# Patient Record
Sex: FEMALE | Race: WHITE | NOT HISPANIC OR LATINO | Employment: UNEMPLOYED | URBAN - METROPOLITAN AREA
[De-identification: names, ages, dates, MRNs, and addresses within clinical notes are randomized per-mention and may not be internally consistent; named-entity substitution may affect disease eponyms.]

---

## 2019-10-28 PROBLEM — N80.129 ENDOMETRIOMA OF OVARY: Status: ACTIVE | Noted: 2019-10-28

## 2020-07-29 DIAGNOSIS — Z36.9 ENCOUNTER FOR FETAL ULTRASOUND: Primary | ICD-10-CM

## 2020-08-04 ENCOUNTER — OFFICE VISIT (OUTPATIENT)
Dept: MATERNAL FETAL MEDICINE | Facility: CLINIC | Age: 35
End: 2020-08-04
Payer: OTHER GOVERNMENT

## 2020-08-04 ENCOUNTER — PROCEDURE VISIT (OUTPATIENT)
Dept: MATERNAL FETAL MEDICINE | Facility: CLINIC | Age: 35
End: 2020-08-04
Payer: OTHER GOVERNMENT

## 2020-08-04 VITALS
DIASTOLIC BLOOD PRESSURE: 74 MMHG | SYSTOLIC BLOOD PRESSURE: 122 MMHG | BODY MASS INDEX: 36.17 KG/M2 | WEIGHT: 191.56 LBS | HEIGHT: 61 IN

## 2020-08-04 DIAGNOSIS — O26.642 CHOLESTASIS DURING PREGNANCY IN SECOND TRIMESTER: Primary | ICD-10-CM

## 2020-08-04 DIAGNOSIS — O28.0 ABNORMAL MSAFP (MATERNAL SERUM ALPHA-FETOPROTEIN), ELEVATED: ICD-10-CM

## 2020-08-04 DIAGNOSIS — R79.89 HIGH SERUM BILE ACID: ICD-10-CM

## 2020-08-04 DIAGNOSIS — O28.5 MATERNAL SERUM SCREEN POSITIVE FOR TRISOMY 21: ICD-10-CM

## 2020-08-04 DIAGNOSIS — Z36.9 ENCOUNTER FOR FETAL ULTRASOUND: ICD-10-CM

## 2020-08-04 PROCEDURE — 76811 PR US, OB FETAL EVAL & EXAM, TRANSABDOM,FIRST GESTATION: ICD-10-PCS | Mod: 26,S$PBB,, | Performed by: OBSTETRICS & GYNECOLOGY

## 2020-08-04 PROCEDURE — 99213 OFFICE O/P EST LOW 20 MIN: CPT | Mod: PBBFAC | Performed by: OBSTETRICS & GYNECOLOGY

## 2020-08-04 PROCEDURE — 99243 PR OFFICE CONSULTATION,LEVEL III: ICD-10-PCS | Mod: 25,S$PBB,, | Performed by: OBSTETRICS & GYNECOLOGY

## 2020-08-04 PROCEDURE — 99243 OFF/OP CNSLTJ NEW/EST LOW 30: CPT | Mod: 25,S$PBB,, | Performed by: OBSTETRICS & GYNECOLOGY

## 2020-08-04 PROCEDURE — 76811 OB US DETAILED SNGL FETUS: CPT | Mod: PBBFAC | Performed by: OBSTETRICS & GYNECOLOGY

## 2020-08-04 PROCEDURE — 99999 PR PBB SHADOW E&M-EST. PATIENT-LVL III: ICD-10-PCS | Mod: PBBFAC,,, | Performed by: OBSTETRICS & GYNECOLOGY

## 2020-08-04 PROCEDURE — 99999 PR PBB SHADOW E&M-EST. PATIENT-LVL III: CPT | Mod: PBBFAC,,, | Performed by: OBSTETRICS & GYNECOLOGY

## 2020-08-04 PROCEDURE — 76811 OB US DETAILED SNGL FETUS: CPT | Mod: 26,S$PBB,, | Performed by: OBSTETRICS & GYNECOLOGY

## 2020-08-04 RX ORDER — LEVOTHYROXINE SODIUM 50 UG/1
TABLET ORAL
Status: ON HOLD | COMMUNITY
End: 2020-09-21

## 2020-08-04 RX ORDER — ASCORBIC ACID, CHOLECALCIFEROL, DL-.ALPHA.-TOCOPHEROL ACETATE, THIAMINE HYDROCHLORIDE, RIBOFLAVIN, NIACINAMIDE, PYRIDOXINE HYDROCHLORIDE, FOLIC ACID, CYANOCOBALAMIN, CALCIUM CARBONATE, FERROUS FUMARATE, ZINC OXIDE, CUPRIC SULFATE 100; 400; 30; 1.6; 1.6; 20; 3.1; 1; 12; 200; 27; 10; 2 MG/1; [IU]/1; [IU]/1; MG/1; MG/1; MG/1; MG/1; MG/1; UG/1; MG/1; MG/1; MG/1; MG/1
TABLET, COATED ORAL
Status: ON HOLD | COMMUNITY
Start: 2020-05-26 | End: 2020-09-21

## 2020-08-04 NOTE — PROGRESS NOTES
Maternal Fetal Medicine consult    SUBJECTIVE:     Shanae Vo is a 34 y.o.  female with IUP at 21w4d who is seen in consultation by MFM for evaluation of:  Elevated bile acids    Patient also incidentally noted to have an abnormal sequential screen    OB HX:  G1 current    The patient was referred from Dr Vega for evaluation of elevated bile acids. The patient reports she started having severe itching of her palms and soles about 1 month ago. It is worse at night time. It now also involves her entire body. She also had elevated LFTs. She also had an abnormal sequential screen with an elevated HCG and AFP level.  She denies a history of liver disease, hepatitis, RUQ pain. She denies IVDU or alcohol intake. She has never had a blood transfusion.  She reports the bile acids were not checked while fasting. She indicated she is only using topical cream for itching and did not have persistent itching during our consult. She denies having any rash. She uses free and clear detergents.    Past Medical History:   Diagnosis Date    Finger infection     right finger- patient will go to urgent care and take care of it    Thyroid disease     hypothyroid     Past Surgical History:   Procedure Laterality Date    DIAGNOSTIC LAPAROSCOPY N/A 10/28/2019    Procedure: LAPAROSCOPY, DIAGNOSTIC;  Surgeon: Luis Armando Vega MD;  Location: [facility] OR;  Service: OB/GYN;  Laterality: N/A;    LAPAROSCOPIC SURGICAL REMOVAL OF CYST OF OVARY Left 10/28/2019    Procedure: EXCISION, CYST, OVARY, LAPAROSCOPIC, LEFT;  Surgeon: Luis Armando Vega MD;  Location: [facility] OR;  Service: OB/GYN;  Laterality: Left;    WISDOM TOOTH EXTRACTION       Family history: negative for birth defects, recurrent miscarriages, chromosomal abnormalities.   Social History     Tobacco Use    Smoking status: Never Smoker   Substance Use Topics    Alcohol use: Yes     Comment: social    Drug use: Never     Review of patient's allergies indicates:    Allergen Reactions    Shrimp Nausea And Vomiting     Medications:  PNV  Levothyroixine  Patient stopped vaginal progesterone after first trimester.    Aneuploidy screening:   NIPT low risk  AFP (3.83 MoM)/HCG (5.93 MoM) elevated on sequential screen-increased risk of Trisomy 21 and ONTD    Records reviewed    Care team members:  Gary - Primary OB     OBJECTIVE:     Blood Pressure: 122/74  Weight: 86kg    Physical Exam:  No RUQ pain on palpation  No rash on hands or feet    Ultrasound performed. See viewpoint for full ultrasound report.    Significant labs/imaging:  Bile acids 20.9  AST 38 (WNL)  ALT 42 (ULN 32)  TSH 1st T 3.7  Normal direct and total bilirubin  RPR NR  HepBS ag negative  plt 420    ASSESSMENT/PLAN:     34 y.o.  female with IUP at 21w4d     1. Itching and increased bile acids:We discussed causes of the lab abnormalities noted above. The patient has clinical symptoms of intrahepatic cholestasis of pregnancy with severe itching, elevated BAs and LFTs, however need to rule out maternal liver disease given these findings and an elevated AFP on sequential screen. Patient is also very early in pregnancy for a diagnosis of cholestasis.Recommend primary OB evaluate fasting bile acids, CMP, hepatitis panel, TSH, preE labs including assessment of urine protein, coagulation panel. TSH should be kept in the range recommended by ACOG (last maternal TSH was elevated for pregnancy).  Recommend primary OB arrange for the patient to have a RUQ/abdominal ultrasound ideally with doppler to assess her liver.  Please send results of the above evaluation to [facility] will place referral for patient to see hepatology. Patient would like to be seen within the [facility] system. As patient has , she may need referral from her PCP. We have also messaged hepatology directly as we desire at timely referral. Primary ob to ensure patient has scheduled an appointment.  Will see patient back in 4 weeks for growth  scan/follow up anatomic survey. If maternal liver disease has been ruled out, will treat patient as though she has ICP. We discussed the pregnancy implications of ICP including fetal distress and stillbirth, which is difficult to predict with antepartum surveillance. Will re-address this issue with the patient if it seems this is the most likely diagnosis. It is too early at this time to initiate  fetal surveillance. I also briefly discussed the utility of ursodiol and we agreed to revisit this at her next visit. This can be revisited sooner if patient has hepatology visit in near future. We are happy to see the patient for an earlier appointment but have tentatively scheduled this pending her hepatology evaluation and the studies above. Recommend primary ob see patient weekly in the interim and follow LFTs weekly if they remain elevated. Please contact M if any concerns prior to this.  Patient advised to avoid hepatotoxic agents.  Patient is aware that she can take Benadryl but has not tried this.  Patient aware that if cholestasis is diagnosed pregnancy planning will be altered.  She has an appointment with her primary ob Friday and was advised to be fasting for that visit.  Patient had one increased bp at 20 weeks but none since or prior. She had a mild headache today but did not take any medications. She denies any other concerns. Advised to contact her OB if no resolution of headache and primary ob should check labs earlier if any concerns.. BP is normal today. Precautions given. Also recommend baseline preeclampsia labs as above with primary ob. Primary ob should monitor patient closely for any signs/symptoms of preeclampsia.      2. Screening: Fetal ultrasound is incomplete but structures visualized today appear normal.  We discussed the possible causes of an elevated maternal serum AFP including open neural tube defects and abdominal wall defects. We discussed other potential etiologies including  but not limited to vaginal  bleeding, placental etiologies, multiple gestation, dating errors, demise, kidney and bowel issues, and other genetic disorders. In very elevated unexplained MSAFP  levels, maternal tumors must be considered. We also discussed the increased risk for pregnancy complications in patients with unexplained, elevated maternal serum AFP. Women with an elevated maternal serum AFP and a normal ultrasound have an increased risk for pre-term labor, decreased birth weight, intrauterine growth retardation, and stillbirth and adverse pregnancy outcomes.The patient did also have bleeding earlier in pregnancy which can contribute to AFP. We discussed the limitations of ultrasound in prenatal diagnosis of these anomalies. The patient was offered amniocentesis and declined.Patient's screen was also positive for Trisomy 21 with low risk NIPT. We discussed the residual risk of a chromosomal abnormality with low risk NIPT and the limitations of ultrasound in evaluating for Trisomy 21. Patient declines amniocentesis.  Primary ob should offer daily baby asa 81mg po daily given increased AFP and hcg on screen.  Patient will need serial growth scans as pregnancy progresses given abnormal screen with multiple abnormal analytes. Timing will be dependent on clinical course and follow up. Recommend primary ob verify correct KRISTINA used on serum screen.    3. Primary ob managing thyroid disease. Please keep TSH in recommended range for pregnancy.    4. Please see separate ultrasound report.    Time spent in consultation today: 45 minutes, >50% of which was face-to-face time with the patient.    Ami Chou MD  Maternal Fetal Medicine fellow  PGY-6

## 2020-08-04 NOTE — LETTER
August 5, 2020      Luis Armando Vega MD  [address]  [address]  [place] [address]           [facility] 4th Fl  [address]  [facility] [address]  Phone: [phone]          Patient: Shanae Vo   MR Number: [id number]   YOB: 1985   Date of Visit: 8/4/2020       Dear Dr. Luis Armando Vega:    Thank you for referring Shanae Vo to me for evaluation. Attached you will find relevant portions of my assessment and plan of care.    If you have questions, please do not hesitate to call me. I look forward to following Shanae Vo along with you.    Sincerely,    Laura Lloyd MD    Enclosure  CC:  No Recipients    If you would like to receive this communication electronically, please contact [email or web address] or [phone] to request more information on [employer] Link access.    For providers and/or their staff who would like to refer a patient to [facility], please contact us through our one-stop-shop provider referral line, [facility] , at [phone].    If you feel you have received this communication in error or would no longer like to receive these types of communications, please e-mail [email or web address]

## 2020-08-05 ENCOUNTER — TELEPHONE (OUTPATIENT)
Dept: TRANSPLANT | Facility: CLINIC | Age: 35
End: 2020-08-05

## 2020-08-05 NOTE — TELEPHONE ENCOUNTER
----- Message from Nissa Garcia sent at 8/5/2020  1:21 PM CDT -----    Returned call to pt and her  about her insur. She said that with the insur they have it allows her to chose her own providers with out a ref.  .  ----- Message -----  From: Jeannie Amezcua  Sent: 8/5/2020  12:57 PM CDT  To: [facility] Liver Referral Pool    Pt is calling to speak to Nissa      Pt contact [phone]

## 2020-08-05 NOTE — TELEPHONE ENCOUNTER
----- Message from Nissa Garcia sent at 8/5/2020 11:12 AM CDT -----  Regarding: FW: patient    Called pt to see who here PCP is; she is alvarez'ed to see [facility] for liver, but there was no answer. M for her to call back with her PCP name and phone number. Because of her insur here PCP will need to submit a ref to  for hepatology, or the sanjana will be for (8/6) tomorrow.   .  ----- Message -----  From: Vivekmarcia Jose  Sent: 8/5/2020  11:08 AM CDT  To: Lizy Montgomery RN, Laura Lloyd MD  Subject: RE: patient                                      This pt already has an appt in [facility] for 8/6  ----- Message -----  From: Lizy Montgomery RN  Sent: 8/5/2020  10:41 AM CDT  To: [facility] Liver Referral Pool  Subject: FW: patient                                        ----- Message -----  From: Laura Lloyd MD  Sent: 8/5/2020   6:40 AM CDT  To: Manan Dia MD, Ami Chou MD, #  Subject: patient                                          Hello,  I am an [facility] physician. I saw this patient as a referral from a physician from outside [facility] and am attempting to facilitate a timely referral to hepatology, which they do not have where she is from. The patient is 21 weeks and began to have itching 3 weeks ago and bile acids were checked which are elevated. They were not fasting so we recommended rechecking. It is early in pregnancy for cholestasis. On her serum screen she also had increased hcg and afp. I am worried that she may have underlying liver pathology. We placed a referral but she has  and knows this may need to come from her PCP. Given she is pregnant and the desire for a timely diagnosis, would you be able to see her in the next 1-2 weeks? We did not want to start ursodiol without her seeing hepatology. Her itching is has been whole body but mainly hands and feet but she has only been using topical treatment. Thanks.  Laura Lloyd MD

## 2020-08-06 ENCOUNTER — HOSPITAL ENCOUNTER (OUTPATIENT)
Dept: RADIOLOGY | Facility: HOSPITAL | Age: 35
Discharge: HOME | End: 2020-08-06
Attending: INTERNAL MEDICINE
Payer: OTHER GOVERNMENT

## 2020-08-06 ENCOUNTER — OFFICE VISIT (OUTPATIENT)
Dept: HEPATOLOGY | Facility: CLINIC | Age: 35
End: 2020-08-06
Payer: OTHER GOVERNMENT

## 2020-08-06 ENCOUNTER — TELEPHONE (OUTPATIENT)
Dept: HEPATOLOGY | Facility: CLINIC | Age: 35
End: 2020-08-06

## 2020-08-06 VITALS
HEIGHT: 61 IN | RESPIRATION RATE: 18 BRPM | WEIGHT: 191.38 LBS | OXYGEN SATURATION: 100 % | SYSTOLIC BLOOD PRESSURE: 140 MMHG | HEART RATE: 80 BPM | BODY MASS INDEX: 36.13 KG/M2 | DIASTOLIC BLOOD PRESSURE: 73 MMHG

## 2020-08-06 DIAGNOSIS — O26.642 CHOLESTASIS DURING PREGNANCY IN SECOND TRIMESTER: ICD-10-CM

## 2020-08-06 PROCEDURE — 93975 VASCULAR STUDY: CPT | Mod: 26,,, | Performed by: RADIOLOGY

## 2020-08-06 PROCEDURE — 99999 PR PBB SHADOW E&M-EST. PATIENT-LVL IV: CPT | Mod: PBBFAC,,, | Performed by: INTERNAL MEDICINE

## 2020-08-06 PROCEDURE — 99999 PR PBB SHADOW E&M-EST. PATIENT-LVL IV: ICD-10-PCS | Mod: PBBFAC,,, | Performed by: INTERNAL MEDICINE

## 2020-08-06 PROCEDURE — 99203 OFFICE O/P NEW LOW 30 MIN: CPT | Mod: S$PBB,,, | Performed by: INTERNAL MEDICINE

## 2020-08-06 PROCEDURE — 93975 US LIVER WITH DOPPLER: ICD-10-PCS | Mod: 26,,, | Performed by: RADIOLOGY

## 2020-08-06 PROCEDURE — 93975 VASCULAR STUDY: CPT | Mod: TC

## 2020-08-06 PROCEDURE — 76705 US LIVER WITH DOPPLER: ICD-10-PCS | Mod: 26,XS,, | Performed by: RADIOLOGY

## 2020-08-06 PROCEDURE — 99214 OFFICE O/P EST MOD 30 MIN: CPT | Mod: PBBFAC | Performed by: INTERNAL MEDICINE

## 2020-08-06 PROCEDURE — 76705 ECHO EXAM OF ABDOMEN: CPT | Mod: 26,XS,, | Performed by: RADIOLOGY

## 2020-08-06 PROCEDURE — 99203 PR OFFICE/OUTPT VISIT, NEW, LEVL III, 30-44 MIN: ICD-10-PCS | Mod: S$PBB,,, | Performed by: INTERNAL MEDICINE

## 2020-08-06 NOTE — LETTER
August 6, 2020      Laura Lloyd MD  [address]  4th [address]  [facility] [address]           [facility] - Hepatology  [address]  [place] [address]  Phone: [phone]  Fax: [phone]          Patient: Shanae Vo   MR Number: [id number]   YOB: 1985   Date of Visit: 8/6/2020       Dear Dr. Laura Lloyd:    Thank you for referring Shanae Vo to me for evaluation. Attached you will find relevant portions of my assessment and plan of care.    If you have questions, please do not hesitate to call me. I look forward to following Shanae Vo along with you.    Sincerely,    Ry Brizuela MD    Enclosure  CC:  No Recipients    If you would like to receive this communication electronically, please contact [email or web address] or [phone] to request more information on [employer] Link access.    For providers and/or their staff who would like to refer a patient to [facility], please contact us through our one-stop-shop provider referral line, [facility] [place], at [phone].    If you feel you have received this communication in error or would no longer like to receive these types of communications, please e-mail [email or web address]

## 2020-08-06 NOTE — LETTER
August 6, 2020        Luis Armando Vega MD  [address]  [address]  [facility] [address]             [facility] - Hepatology  [address]  [facility] [address]  Phone: [phone]  Fax: [phone]   Patient: Shanae Vo   MR Number: [id number]   YOB: 1985   Date of Visit: 8/6/2020       Dear Dr. Vega:    Thank you for referring Shanae Vo to me for evaluation. Attached you will find relevant portions of my assessment and plan of care.    If you have questions, please do not hesitate to call me. I look forward to following Shanae Vo along with you.    Sincerely,      Ry Brizuela MD            CC  No Recipients    Enclosure

## 2020-08-06 NOTE — PROGRESS NOTES
Hepatology Consult Note    Referring provider: Dr. Laura Lloyd  Chief complaint:   Chief Complaint   Patient presents with    Advice Only       HPI:  Shanae Vo is a pleasant 34 y.o. femalewho was referred to Hepatology Clinic for consultation of had concerns including Advice Only..      Patient is pregnant - 22 weeks gestation, this is her first pregnancy.  She started itching about a month ago. Outside labs at her OB in [place] showed increased bile acids.  She also saw fetal maternal high risk OB at [facility].  No prior hx of itching, cholestasis or liver disease. No family hx of liver disease.  Patient is originally from [place] but here with her  - . They now reside in [place].    Patient Active Problem List   Diagnosis    Endometrioma of ovary       Past Medical History:   Diagnosis Date    Finger infection     right finger- patient will go to urgent care and take care of it    Thyroid disease     hypothyroid       Past Surgical History:   Procedure Laterality Date    DIAGNOSTIC LAPAROSCOPY N/A 10/28/2019    Procedure: LAPAROSCOPY, DIAGNOSTIC;  Surgeon: Luis Armando Vega MD;  Location: [facility] OR;  Service: OB/GYN;  Laterality: N/A;    LAPAROSCOPIC SURGICAL REMOVAL OF CYST OF OVARY Left 10/28/2019    Procedure: EXCISION, CYST, OVARY, LAPAROSCOPIC, LEFT;  Surgeon: Luis Armando Vega MD;  Location: [facility] OR;  Service: OB/GYN;  Laterality: Left;    WISDOM TOOTH EXTRACTION         History reviewed. No pertinent family history.    Social History     Socioeconomic History    Marital status:      Spouse name: Not on file    Number of children: Not on file    Years of education: Not on file    Highest education level: Not on file   Occupational History    Not on file   Social Needs    Financial resource strain: Not on file    Food insecurity     Worry: Not on file     Inability: Not on file    Transportation needs     Medical: Not on file     Non-medical: Not  "on file   Tobacco Use    Smoking status: Never Smoker   Substance and Sexual Activity    Alcohol use: Yes     Comment: social    Drug use: Never    Sexual activity: Not on file   Lifestyle    Physical activity     Days per week: Not on file     Minutes per session: Not on file    Stress: Not on file   Relationships    Social connections     Talks on phone: Not on file     Gets together: Not on file     Attends Bahai service: Not on file     Active member of club or organization: Not on file     Attends meetings of clubs or organizations: Not on file     Relationship status: Not on file   Other Topics Concern    Not on file   Social History Narrative    Not on file       Current Outpatient Medications   Medication Sig Dispense Refill    levothyroxine (SYNTHROID) 50 MCG tablet levothyroxine 50 mcg tablet   TAKE 1 TABLET BY MOUTH ONCE DAILY      levothyroxine (TIROSINT) 50 mcg Cap Take 1 tablet by mouth once daily.      sulfamethoxazole-trimethoprim 400-80mg (BACTRIM,SEPTRA) 400-80 mg per tablet Take 1 tablet by mouth 2 (two) times daily.       27 mg iron- 1 mg Tab        No current facility-administered medications for this visit.        Review of patient's allergies indicates:   Allergen Reactions    Shrimp Nausea And Vomiting            Vitals:    08/06/20 1339   BP: (!) 140/73   Pulse: 80   Resp: 18   SpO2: 100%   Weight: 86.8 kg (191 lb 5.8 oz)   Height: 5' 1" (1.549 m)       Physical Exam  Constitutional:       Appearance: Normal appearance.   HENT:      Head: Normocephalic and atraumatic.   Eyes:      General: No scleral icterus.  Cardiovascular:      Rate and Rhythm: Normal rate and regular rhythm.   Pulmonary:      Effort: Pulmonary effort is normal. No respiratory distress.      Breath sounds: Normal breath sounds. No wheezing.   Abdominal:      General: There is no distension.      Palpations: Abdomen is soft. There is no mass.      Tenderness: There is no abdominal tenderness. "   Skin:     Coloration: Skin is not jaundiced.   Neurological:      Mental Status: She is alert. Mental status is at baseline.         LABS: I personally reviewed pertinent laboratory findings.    No results found for: ALT, AST, GGT, ALKPHOS, BILITOT    No results found for: WBC, HGB, HCT, MCV, PLT    No results found for: NA, K, CL, CO2, BUN, CREATININE, CALCIUM, ANIONGAP, ESTGFRAFRICA, EGFRNONAA    No results found for: HAV, HEPAIGM, HEPBIGM, HEPBCAB, HBEAG, HEPCAB    No results found for: SMOOTHMUSCAB, MITOAB    I personally reviewed all recent lab results.  I personally reviewed imaging studies.    Assessment:  34 y.o. female presenting with     1. Cholestasis during pregnancy in second trimester          Recommendation(s):  - will order serologies/autoimmune markers, bile acids as well as liver ultrasound w/ doppler to r/o other possible causes of liver disease for the sake of thoroughness in work-up  - if bile acids remains high and itching persists, will plan to start her on ursodeoxycholic acid  - RTC in 2 months    A total of 30 minutes were spent face-to-face with the patient during this encounter and over half of that time was spent on counseling and coordination of care.  We discussed in depth the nature of the patient's liver disease, the management plan in details. I also educated the patient about lifestyle modifications which may improve hepatic steatosis, overweight/obesity, insulin resistance and high blood pressure issues. I have provided the patient with an opportunity to ask questions and have all questions answered to his satisfaction.     I have sent communication to the referring physician and/or primary care provider.    Ry Brizuela MD  Staff Physician  Hepatology and Liver Transplant  [facility]  [facility]

## 2020-08-06 NOTE — PATIENT INSTRUCTIONS
What is Intrahepatic Cholestasis of Pregnancy?  Intrahepatic Cholestasis of Pregnancy refers to a liver condition in which the normal flow of  bile is impaired resulting in severe itching in the mother and a risk for stillbirth and  prematurity (delivery before term) in the baby. ICP usually develops during the last third of  pregnancy, when hormone levels are highest. ICP is also referred to as obstetric cholestasis,  cholestasis of pregnancy, and pruritus/prurigo gravidarum.    What causes Intrahepatic Cholestasis of Pregnancy?  There is a defect in the excretion of bile from the liver resulting in rising of bile acids in the  blood. The exact mechanism is still not fully understood, but current research suggests  genetic, hormonal and environmental factors. Recently a particular gene mutation (change in  a specific gene) has been detected in some ICP patients. ICP typically develops when there  are high hormone levels in late pregnancy and in women carrying twins or triplets. It may  also occur in women on the oral contraceptive pill. In [place] about 0.2-2.4% of pregnant  women will get the condition; in [place] and [place] ICP is more common.    Does Intrahepatic Cholestasis of Pregnancy run in families?  Yes - mothers and sisters of patients with ICP are at higher risk of developing ICP, because  there seems to be a change in one of your genes, which may be present in other family  members. ICP also tends to recur in subsequent / following pregnancies (60-90%).    What are the features of Intrahepatic Cholestasis of Pregnancy and what does it look  like?  The main feature is a sudden onset of severe itch that will typically increase in severity until  you are either treated for it or delivery takes place. It often starts on the palms and soles, but  then quickly involves the whole body. The longer the itch persists, the more skin changes  due to scratching may be present. These vary from fine scratch  marks (excoriations) to 5-  10mm raised lumps (so-called prurigo nodules) typically involving the shins, arms and  buttocks. Apart from these changes, there is usually no rash associated with ICP. Loss of  sleep, loss of appetite, and an inability to perform normal daily tasks can be a result of the  intense itching.  Less common symptoms (<10% of patients) include dark urine and/or pale stools (greyish in  colour), jaundice (the white of your eyes and sometimes the skin becomes yellowish), pain in  your belly, and nausea.    How will Intrahepatic Cholestasis of Pregnancy be diagnosed?  The best available test for ICP is the serum bile acid test. A diagnosis of ICP is certain  when bile acid levels are elevated above the normal range. As bile acids are not part of a  routine liver function test (LFT), this test must be specifically requested by your doctor.  However, there are only few laboratories that have the equipment necessary to perform this  test, because of this the diagnosis may be delayed.  A routine liver function test (LFT) that measures liver enzymes in the blood should also be  done when checking for ICP. However, this test may be normal in ICP, meaning you may still   have ICP, because serum bile acid levels typically rise before liver enzymes increase. If  serum bile acid levels are normal but LFTs are found to be increased, your doctor will  search for other liver diseases for example hepatitis, other tests are required to diagnose  This.    Will the baby be affected?  Yes, this may happen. ICP harbours an increased risk for infant stillbirth (intrauterine death  of the baby), premature (early) labour, and foetal distress (being unwell). If LFTs are  abnormal in patients with ICP, in particular an elevated bilirubin (your eyes and skin may turn  yellow). There is an increased risk for bleeding (haemorrhage) in both mother and child,  because vitamin K, which is essential for blood clotting, is  not absorbed properly.  Can Intrahepatic Cholestasis of Pregnancy be cured?  No. It can not be cured, because it is, at least partly, determined by your genes. However,  treatment for ICP will help both the mothers and babys symptoms. Without treatment, the  itch usually stops within days to weeks after delivery when hormone levels return to normal.  However, a recurrence in following pregnancies or being on the contraceptive pill is common    How can Intrahepatic Cholestasis of Pregnancy be treated?  Two important steps may be taken:  (1) Reducing the bile acids in your bloodstream: Ursodeoxycholic acid (UDCA - a naturally  occurring bile acid) is currently the best treatment of ICP. UCDA improves the liver function,  helps to reduce the toxic (bad) bile acid concentration in the bloodstream and improves bile  acid transport across the placenta (takes it away from the baby). However, UDCA is not  licensed for use in pregnancy. It may be prescribed on an individual base, with what is called  informed consent. You have to discuss this with your doctor.  It is the only treatment that has been shown to reduce foetal risks in ICP. UDCA tablets,  15mg/kg/day or simply 1g daily are given either as a single dose or divided into 2-3 doses  and continued until delivery then treatment can usually be stopped.  Other treatments seem not to be beneficial. Cholestyramine and other cholesterol lowering  drugs should be avoided because they may increase the risk of bleeding.  (2) Delivering the mother as early as possible. However, the development of the babys lungs  is important, and usually delivery may be after they are fully developed at weeks 36 or 37 of  your pregnancy.    Is the treatment safe for the baby and mother? Is any special monitoring required?  UDCA is safe for the mother and the baby and it is the only treatment that will reduce the  babys risk. The only adverse effect may be mild diarrhoea in the  mother.  Close monitoring of the baby is important; your doctor may prefer to deliver the baby early  (between weeks 37 and 38 of your pregnancy) in order to keep the risk for your baby as low  as possible.  Certain weekly check-ups upon diagnosis are suggested; they include a biophysical profile  (test that uses the non-stress test and ultrasound to examine foetal movements, heart rate,  and amniotic fluid amounts), cardiotocography (evaluates foetal heart rate), Doppler flow  study (a type of ultrasound which uses sound waves to measure the blood flow). These tests  need to be discussed with a specialist.    Is normal delivery possible?  Yes.    Can women with Intrahepatic Cholestasis of Pregnancy still breastfeed?  Yes. Neither ICP nor treatment with UDCA influences breastfeeding negatively.    Where can I get more information about Intrahepatic Cholestasis of Pregnancy?  [email or web address]

## 2020-08-06 NOTE — TELEPHONE ENCOUNTER
Called patient inform her that Dr. Kaplan have emergency and we have to reschedule her appt with a different provider. Patient understood and accepted the appt to see Dr. Brizuela at 2pm.

## 2020-08-07 ENCOUNTER — DOCUMENTATION ONLY (OUTPATIENT)
Dept: MATERNAL FETAL MEDICINE | Facility: CLINIC | Age: 35
End: 2020-08-07

## 2020-08-07 ENCOUNTER — TELEPHONE (OUTPATIENT)
Dept: HEPATOLOGY | Facility: CLINIC | Age: 35
End: 2020-08-07

## 2020-08-07 NOTE — PROGRESS NOTES
I reviewed the hepatology note. I spoke to Dr. Vega as I again noted the patient's blood pressure was elevated (had one prior elevation with Dr. Vega). The patient is seeing Dr. Vega today. I recommend he evaluate her for preeclampsia with labs and urine protein assessment. Advised him to check circumstances of bp and if any concerns to contact [facility].  I will have our office send him the hepatology information that we have so far. Some of it is pending.  I will also ask our staff to move up her [facility] appt.

## 2020-08-11 ENCOUNTER — DOCUMENTATION ONLY (OUTPATIENT)
Dept: MATERNAL FETAL MEDICINE | Facility: CLINIC | Age: 35
End: 2020-08-11

## 2020-08-11 DIAGNOSIS — R79.89 HIGH SERUM BILE ACID: Primary | ICD-10-CM

## 2020-08-11 DIAGNOSIS — O26.642 CHOLESTASIS DURING PREGNANCY IN SECOND TRIMESTER: ICD-10-CM

## 2020-08-11 NOTE — PROGRESS NOTES
Received results from Dr. Vega office;  Fasting bile acids are normal at less than 1  ALT one on lab slightly increased at 42 (normal 32), 44  AST slightly increased at 43 (normal 38), 37  Fasting bile acids less than one (bile acids through our system were 9 but were not fasting).  Cr low (cannot read number).  No urine protein assessment.  TSH 1.03  Hepatitis A IgM positive on outside panel.  Hepaititis A IgG negative on our panel.  Plt 287    I spoke with Dr. Brizuela.  He is going to contact the patient with her follow-up results from his office.  They will schedule her for follow-up.  She indicated he would recommend repeating the hepatitis a testing through our laboratory with both IgG and IgM, as well as liver function tests, and repeat fasting bile acids.    Recommend primary OB sent as a baseline assessment of urine protein.    Additionally, I spoke with the patient via phone and discussed with her the results and the potential for hepatitis a.  The patient is still having some itching.  We did discuss that the bile acids from the [facility] system were 9 but these were not drawn fasting.  I have also discussed with Dr. Brizuela that this is early for cholestasis of pregnancy but there could be some other sort of cholestatic process.  He recommended repeating these labs to determine further care.  I also spoke with Dr. Barbour who was seen the patient on Friday.  The patient is going to eat breakfast at approximately 6:30 a.m. and get her follow-up labs at approximately 2:30 a.m. after her visit to that she is fasting.    Note faxed to Dr. Vega office as well.

## 2020-08-14 ENCOUNTER — TELEPHONE (OUTPATIENT)
Dept: HEPATOLOGY | Facility: CLINIC | Age: 35
End: 2020-08-14

## 2020-08-14 ENCOUNTER — INITIAL CONSULT (OUTPATIENT)
Dept: MATERNAL FETAL MEDICINE | Facility: CLINIC | Age: 35
DRG: 833 | End: 2020-08-14
Attending: OBSTETRICS & GYNECOLOGY
Payer: OTHER GOVERNMENT

## 2020-08-14 ENCOUNTER — PROCEDURE VISIT (OUTPATIENT)
Dept: MATERNAL FETAL MEDICINE | Facility: CLINIC | Age: 35
End: 2020-08-14
Payer: OTHER GOVERNMENT

## 2020-08-14 ENCOUNTER — TELEPHONE (OUTPATIENT)
Dept: TRANSPLANT | Facility: CLINIC | Age: 35
End: 2020-08-14

## 2020-08-14 VITALS
BODY MASS INDEX: 36.03 KG/M2 | SYSTOLIC BLOOD PRESSURE: 130 MMHG | DIASTOLIC BLOOD PRESSURE: 84 MMHG | WEIGHT: 190.69 LBS

## 2020-08-14 DIAGNOSIS — Z3A.23 23 WEEKS GESTATION OF PREGNANCY: ICD-10-CM

## 2020-08-14 DIAGNOSIS — R79.89 HIGH SERUM BILE ACID: Primary | ICD-10-CM

## 2020-08-14 DIAGNOSIS — R74.8 ELEVATED LIVER ENZYMES: ICD-10-CM

## 2020-08-14 DIAGNOSIS — Z36.89 ENCOUNTER FOR ULTRASOUND TO ASSESS FETAL GROWTH: ICD-10-CM

## 2020-08-14 DIAGNOSIS — O99.891 CURRENT MATERNAL CONDITION AFFECTING PREGNANCY: ICD-10-CM

## 2020-08-14 DIAGNOSIS — O13.2 GESTATIONAL HYPERTENSION, SECOND TRIMESTER: ICD-10-CM

## 2020-08-14 DIAGNOSIS — O26.642 CHOLESTASIS DURING PREGNANCY IN SECOND TRIMESTER: ICD-10-CM

## 2020-08-14 DIAGNOSIS — L29.9 ITCHING: ICD-10-CM

## 2020-08-14 PROCEDURE — 76816 OB US FOLLOW-UP PER FETUS: CPT | Mod: PBBFAC | Performed by: OBSTETRICS & GYNECOLOGY

## 2020-08-14 PROCEDURE — 99999 PR PBB SHADOW E&M-EST. PATIENT-LVL III: ICD-10-PCS | Mod: PBBFAC,,, | Performed by: OBSTETRICS & GYNECOLOGY

## 2020-08-14 PROCEDURE — 99213 OFFICE O/P EST LOW 20 MIN: CPT | Mod: 25,S$PBB,, | Performed by: OBSTETRICS & GYNECOLOGY

## 2020-08-14 PROCEDURE — 76816 PR  US,PREGNANT UTERUS,F/U,TRANSABD APP: ICD-10-PCS | Mod: 26,S$PBB,, | Performed by: OBSTETRICS & GYNECOLOGY

## 2020-08-14 PROCEDURE — 76816 OB US FOLLOW-UP PER FETUS: CPT | Mod: 26,S$PBB,, | Performed by: OBSTETRICS & GYNECOLOGY

## 2020-08-14 PROCEDURE — 99213 PR OFFICE/OUTPT VISIT, EST, LEVL III, 20-29 MIN: ICD-10-PCS | Mod: 25,S$PBB,, | Performed by: OBSTETRICS & GYNECOLOGY

## 2020-08-14 PROCEDURE — 99999 PR PBB SHADOW E&M-EST. PATIENT-LVL III: CPT | Mod: PBBFAC,,, | Performed by: OBSTETRICS & GYNECOLOGY

## 2020-08-14 NOTE — TELEPHONE ENCOUNTER
Called and informed patient that bile acids are normal. It seems unlikely that she has ICP. External labs apparently showed possible Hep A. Dr. Lloyd is repeating labs Hep A Ig M. Hep A Ig G was neg. Without transaminases elevation and symptoms, I doubt that she had hep A. We will discuss her lab results next week on the phone. Reassurance given.

## 2020-08-14 NOTE — PROGRESS NOTES
Maternal Fetal Medicine follow up consult    SUBJECTIVE:     Shanae Vo is a 34 y.o.  female with IUP at 23w0d who is seen in follow up consultation by MFM.  Pregnancy complications include: abnormal liver function tests.    Previous notes reviewed.   No changes to medical, surgical, family, social, or obstetric history.    Interval history since last MFM visit: patient seen by hepatology. Liver U/S WNL. Repeat LFTs and bile acids x2, fasting. Bile acids normal x2. LFTs remain very mildly elevated. Hepatitis A IgM was positive with primary OB, IgG not checked in that panel. Hepatitis A IgG negative with hepatology, IgM was not checked with that panel.   Patient reports persistent itching and feeling tired. Denies N/V/D, denies fevers. Sometimes has headaches that resolve spontaneously. No other symptoms and no other interval changes. No changes to skin or urine color.  Patient reports that she had a 24 hour urine completed earlier this week, was told by OB office that the results were abnormal. Records were requested from primary OB this morning, records have not yet been sent.     Medications:  No change    Care team members:  Gary - Primary OB     OBJECTIVE:     Blood Pressure: 130/84  Weight: 86kg    Ultrasound performed. See viewpoint for full ultrasound report.    ASSESSMENT/PLAN:     34 y.o.  female with IUP at 23w0d     To lab now for fasting bile acids, LFTs, Hepatitis A IgM/IgG, P:C ratio. I discussed with the patient that the next step in her management will depend on the results of these tests.  Hepatology to call patient next week to discuss lab results.  Will not treat with ursodiol at this time given 2 normal bile acids.  Discussed pre-eclampsia precautions. Patient given BP parameters and when to call OB and when to go to ED if BPs are severe or if patient has symptoms of pre-eclampsia. Patient has a BP cuff at home and will check her BP daily.  F/U with MFM in 4 weeks for growth  scan. Will contact the patient next week with results.     Time spent in consultation today: 15 minutes, >50% of which was face-to-face time with the patient.    Ami Chou MD  Maternal Fetal Medicine fellow  PGY-6

## 2020-08-14 NOTE — TELEPHONE ENCOUNTER
----- Message from Kenton Garcia sent at 8/14/2020 10:51 AM CDT -----  Pt calling to schedule an appt        [phone] (M)

## 2020-08-15 ENCOUNTER — HOSPITAL ENCOUNTER (INPATIENT)
Facility: OTHER | Age: 35
LOS: 2 days | Discharge: HOME | DRG: 833 | End: 2020-08-17
Attending: OBSTETRICS & GYNECOLOGY | Admitting: OBSTETRICS & GYNECOLOGY
Payer: OTHER GOVERNMENT

## 2020-08-15 ENCOUNTER — DOCUMENTATION ONLY (OUTPATIENT)
Dept: MATERNAL FETAL MEDICINE | Facility: CLINIC | Age: 35
End: 2020-08-15

## 2020-08-15 DIAGNOSIS — L29.9 ITCHING: Primary | ICD-10-CM

## 2020-08-15 DIAGNOSIS — O99.891 CURRENT MATERNAL CONDITION AFFECTING PREGNANCY: ICD-10-CM

## 2020-08-15 DIAGNOSIS — R74.01 TRANSAMINITIS: ICD-10-CM

## 2020-08-15 DIAGNOSIS — O12.12 PROTEINURIA AFFECTING PREGNANCY IN SECOND TRIMESTER: ICD-10-CM

## 2020-08-15 DIAGNOSIS — Z3A.23 23 WEEKS GESTATION OF PREGNANCY: ICD-10-CM

## 2020-08-15 PROBLEM — E03.8 OTHER SPECIFIED HYPOTHYROIDISM: Status: ACTIVE | Noted: 2020-08-15

## 2020-08-15 LAB
ABO + RH BLD: NORMAL
ALBUMIN SERPL BCP-MCNC: 2.8 G/DL (ref 3.5–5.2)
ALP SERPL-CCNC: 145 U/L (ref 55–135)
ALT SERPL W/O P-5'-P-CCNC: 45 U/L (ref 10–44)
ANION GAP SERPL CALC-SCNC: 12 MMOL/L (ref 8–16)
APTT BLDCRRT: 26.3 SEC (ref 21–32)
AST SERPL-CCNC: 35 U/L (ref 10–40)
BASOPHILS # BLD AUTO: 0.02 K/UL (ref 0–0.2)
BASOPHILS NFR BLD: 0.2 % (ref 0–1.9)
BILIRUB SERPL-MCNC: 0.2 MG/DL (ref 0.1–1)
BLD GP AB SCN CELLS X3 SERPL QL: NORMAL
BUN SERPL-MCNC: 9 MG/DL (ref 6–20)
CALCIUM SERPL-MCNC: 9.4 MG/DL (ref 8.7–10.5)
CHLORIDE SERPL-SCNC: 107 MMOL/L (ref 95–110)
CO2 SERPL-SCNC: 18 MMOL/L (ref 23–29)
CREAT SERPL-MCNC: 0.6 MG/DL (ref 0.5–1.4)
DIFFERENTIAL METHOD: NORMAL
EOSINOPHIL # BLD AUTO: 0.1 K/UL (ref 0–0.5)
EOSINOPHIL NFR BLD: 0.8 % (ref 0–8)
ERYTHROCYTE [DISTWIDTH] IN BLOOD BY AUTOMATED COUNT: 13.4 % (ref 11.5–14.5)
EST. GFR  (AFRICAN AMERICAN): >60 ML/MIN/1.73 M^2
EST. GFR  (NON AFRICAN AMERICAN): >60 ML/MIN/1.73 M^2
FIBRINOGEN PPP-MCNC: 688 MG/DL (ref 182–366)
GLUCOSE SERPL-MCNC: 111 MG/DL (ref 70–110)
HCT VFR BLD AUTO: 38.4 % (ref 37–48.5)
HGB BLD-MCNC: 13.1 G/DL (ref 12–16)
IMM GRANULOCYTES # BLD AUTO: 0.03 K/UL (ref 0–0.04)
IMM GRANULOCYTES NFR BLD AUTO: 0.3 % (ref 0–0.5)
INR PPP: 0.9 (ref 0.8–1.2)
LDH SERPL L TO P-CCNC: 182 U/L (ref 110–260)
LYMPHOCYTES # BLD AUTO: 2 K/UL (ref 1–4.8)
LYMPHOCYTES NFR BLD: 22 % (ref 18–48)
MCH RBC QN AUTO: 29.8 PG (ref 27–31)
MCHC RBC AUTO-ENTMCNC: 34.1 G/DL (ref 32–36)
MCV RBC AUTO: 88 FL (ref 82–98)
MONOCYTES # BLD AUTO: 0.8 K/UL (ref 0.3–1)
MONOCYTES NFR BLD: 8.9 % (ref 4–15)
NEUTROPHILS # BLD AUTO: 6.2 K/UL (ref 1.8–7.7)
NEUTROPHILS NFR BLD: 67.8 % (ref 38–73)
NRBC BLD-RTO: 0 /100 WBC
PLATELET # BLD AUTO: 278 K/UL (ref 150–350)
PMV BLD AUTO: 9.7 FL (ref 9.2–12.9)
POTASSIUM SERPL-SCNC: 3.5 MMOL/L (ref 3.5–5.1)
PROT SERPL-MCNC: 6.7 G/DL (ref 6–8.4)
PROTHROMBIN TIME: 9.8 SEC (ref 9–12.5)
RBC # BLD AUTO: 4.39 M/UL (ref 4–5.4)
SARS-COV-2 RDRP RESP QL NAA+PROBE: NEGATIVE
SODIUM SERPL-SCNC: 137 MMOL/L (ref 136–145)
WBC # BLD AUTO: 9.1 K/UL (ref 3.9–12.7)

## 2020-08-15 PROCEDURE — 80053 COMPREHEN METABOLIC PANEL: CPT

## 2020-08-15 PROCEDURE — 99223 1ST HOSP IP/OBS HIGH 75: CPT | Mod: ,,, | Performed by: OBSTETRICS & GYNECOLOGY

## 2020-08-15 PROCEDURE — 85384 FIBRINOGEN ACTIVITY: CPT

## 2020-08-15 PROCEDURE — 85730 THROMBOPLASTIN TIME PARTIAL: CPT

## 2020-08-15 PROCEDURE — U0002 COVID-19 LAB TEST NON-CDC: HCPCS

## 2020-08-15 PROCEDURE — 99223 PR INITIAL HOSPITAL CARE,LEVL III: ICD-10-PCS | Mod: ,,, | Performed by: OBSTETRICS & GYNECOLOGY

## 2020-08-15 PROCEDURE — 83615 LACTATE (LD) (LDH) ENZYME: CPT

## 2020-08-15 PROCEDURE — 86850 RBC ANTIBODY SCREEN: CPT

## 2020-08-15 PROCEDURE — 85025 COMPLETE CBC W/AUTO DIFF WBC: CPT

## 2020-08-15 PROCEDURE — 85610 PROTHROMBIN TIME: CPT

## 2020-08-15 PROCEDURE — 11000001 HC ACUTE MED/SURG PRIVATE ROOM

## 2020-08-15 PROCEDURE — 99285 EMERGENCY DEPT VISIT HI MDM: CPT | Mod: 25

## 2020-08-15 RX ORDER — LEVOTHYROXINE SODIUM 25 UG/1
50 TABLET ORAL
Status: DISCONTINUED | OUTPATIENT
Start: 2020-08-16 | End: 2020-08-17 | Stop reason: HOSPADM

## 2020-08-15 RX ORDER — SODIUM CHLORIDE 9 MG/ML
INJECTION, SOLUTION INTRAVENOUS CONTINUOUS
Status: DISCONTINUED | OUTPATIENT
Start: 2020-08-16 | End: 2020-08-17 | Stop reason: HOSPADM

## 2020-08-15 RX ORDER — PRENATAL WITH FERROUS FUM AND FOLIC ACID 3080; 920; 120; 400; 22; 1.84; 3; 20; 10; 1; 12; 200; 27; 25; 2 [IU]/1; [IU]/1; MG/1; [IU]/1; MG/1; MG/1; MG/1; MG/1; MG/1; MG/1; UG/1; MG/1; MG/1; MG/1; MG/1
1 TABLET ORAL DAILY
Status: DISCONTINUED | OUTPATIENT
Start: 2020-08-16 | End: 2020-08-17 | Stop reason: HOSPADM

## 2020-08-15 RX ORDER — ACETAMINOPHEN 325 MG/1
650 TABLET ORAL EVERY 6 HOURS PRN
Status: DISCONTINUED | OUTPATIENT
Start: 2020-08-16 | End: 2020-08-17 | Stop reason: HOSPADM

## 2020-08-15 RX ORDER — SIMETHICONE 80 MG
1 TABLET,CHEWABLE ORAL EVERY 6 HOURS PRN
Status: DISCONTINUED | OUTPATIENT
Start: 2020-08-16 | End: 2020-08-17 | Stop reason: HOSPADM

## 2020-08-15 RX ORDER — ONDANSETRON 8 MG/1
8 TABLET, ORALLY DISINTEGRATING ORAL EVERY 8 HOURS PRN
Status: DISCONTINUED | OUTPATIENT
Start: 2020-08-16 | End: 2020-08-17 | Stop reason: HOSPADM

## 2020-08-15 RX ORDER — DIPHENHYDRAMINE HCL 25 MG
25 CAPSULE ORAL EVERY 4 HOURS PRN
Status: DISCONTINUED | OUTPATIENT
Start: 2020-08-16 | End: 2020-08-17 | Stop reason: HOSPADM

## 2020-08-15 RX ORDER — AMOXICILLIN 250 MG
1 CAPSULE ORAL NIGHTLY PRN
Status: DISCONTINUED | OUTPATIENT
Start: 2020-08-16 | End: 2020-08-17 | Stop reason: HOSPADM

## 2020-08-15 NOTE — PROGRESS NOTES
I spoke to the patient via phone with respect to the increased LFTs and borderline urine p/c ratio. I recommended she come to [facility] for further evaluation this evening.  I discussed with her that her case is unusual. I do have some concerns with LFTs rising, borderline BP, and borderline pc ratio.  Patient is aware that we desire to evaluate further for preeclampsia.  Follow up hepatitis labs and bile acids are pending.  They just left [place] and she has been driving in car today.  I encouraged her to ambulate prior to coming back to [facility] via car to decrease risk of DVT/thromboembolism.  We discussed she did not urgently need to present but ideally within the next few hours. I would not recommend she delay until tomorrow.  She is aware it may be an overnight stay.  I also spoke with her significant other.  The phone call broke up some intermittently but they understood my recommendations and directions to L and D.  I spoke with Dr. Barbour, on call [facility] who also saw patient yesterday who agrees with the plan.  Patient is aware that some of the labs are pending.  Patient reports unable to get 24 hour urine results from prior visit as Dr. Vega office closed early on Friday.  Patient aware hepatitis/cholestasis may play a role but we do not have the follow up labs.  May benefit from ldh and coags and defer bmz etc to on call team.  Dr. Barbour will notify resident staff.  Patient aware that I recommend coming to [facility] as [facility] is there and most ideal to have the labs done within the same system. While unfortunate that they had left [facility], I feel it is most ideal for her to have her workup at [facility] and she expressed her understanding.  Discussed with her and her significant other that we are just being cautious and trying to take the best care of the patient and fetus.    Addendum: Patient at increased risk for adverse outcomes with analyte pattern and this was reviewed at initial consult with JEANNETTE

## 2020-08-16 ENCOUNTER — ANESTHESIA EVENT (OUTPATIENT)
Dept: OBSTETRICS AND GYNECOLOGY | Facility: OTHER | Age: 35
End: 2020-08-16

## 2020-08-16 ENCOUNTER — ANESTHESIA (OUTPATIENT)
Dept: OBSTETRICS AND GYNECOLOGY | Facility: OTHER | Age: 35
End: 2020-08-16

## 2020-08-16 LAB
PROT 24H UR-MRATE: NORMAL MG/SPEC (ref 0–100)
PROT UR-MCNC: <7 MG/DL (ref 0–15)
URINE COLLECTION DURATION: 24 HR
URINE VOLUME: 3500 ML

## 2020-08-16 PROCEDURE — 99223 1ST HOSP IP/OBS HIGH 75: CPT | Mod: ,,, | Performed by: OBSTETRICS & GYNECOLOGY

## 2020-08-16 PROCEDURE — 11000001 HC ACUTE MED/SURG PRIVATE ROOM

## 2020-08-16 PROCEDURE — 36415 COLL VENOUS BLD VENIPUNCTURE: CPT

## 2020-08-16 PROCEDURE — 99223 PR INITIAL HOSPITAL CARE,LEVL III: ICD-10-PCS | Mod: ,,, | Performed by: OBSTETRICS & GYNECOLOGY

## 2020-08-16 PROCEDURE — 25000003 PHARM REV CODE 250: Performed by: STUDENT IN AN ORGANIZED HEALTH CARE EDUCATION/TRAINING PROGRAM

## 2020-08-16 PROCEDURE — 82239 BILE ACIDS TOTAL: CPT

## 2020-08-16 PROCEDURE — 84156 ASSAY OF PROTEIN URINE: CPT

## 2020-08-16 RX ADMIN — LEVOTHYROXINE SODIUM 50 MCG: 25 TABLET ORAL at 05:08

## 2020-08-16 NOTE — PROGRESS NOTES
[facility]  Obstetrics  Antepartum Progress Note    Patient Name: Shanae Vo  MRN: [id number]  Admission Date: 8/15/2020  Hospital Length of Stay: 1 days  Attending Physician: Lisa Barbour MD  Primary Care Provider: Primary Doctor No    Subjective:     Principal Problem:Transaminitis    HPI:  Shanae Vo is a 34 y.o. F at 23w1d presents to the OB ED after being called by MFM secondary to lab abnormalities.  Patient has history of profound itching this pregnancy.  States the itching is all over her body, including palms and soles.  Not associated with a rash.  Also states that she has had mild nausea but no vomiting.  She says she has noticed jaundice of her eyes.  She denies right upper quadrant abdominal pain.  MFM noted elevations in her LFTs.  Would like patient admitted for observation and further lab workup.      Of note primary OBGYN noted elevated bile acids at last visit.  However on further investigation it was noted these were not taken while fasting.  Please see below for last MFM note from clinic.  Concern for cholestasis of pregnancy versus hepatitis versus early preeclampsia.     Previous HPI:   The patient was referred from Dr Vega for evaluation of elevated bile acids. The patient reports she started having severe itching of her palms and soles about 1 month ago. It is worse at night time. It now also involves her entire body. She also had elevated LFTs. She also had an abnormal sequential screen with an elevated HCG and AFP level.  She denies a history of liver disease, hepatitis, RUQ pain. She denies IVDU or alcohol intake. She has never had a blood transfusion.  She reports the bile acids were not checked while fasting. She indicated she is only using topical cream for itching and did not have persistent itching during our consult. She denies having any rash. She uses free and clear detergents.    Hospital Course:  08/15/2020 - Admitted for monitoring,  further lab testing, and 24 hour urine. Concern for hepatitis vs cholestasis of pregnancy vs early development of PreE.   08/16/2020 - 24 hour urine pending. Bile Acids pending. All coags normal. Transaminitis currently resolved.     Obstetric HPI:  Patient denies contractions, active fetal movement, absent vaginal bleeding , absent loss of fluid      Objective:     Vital Signs (Most Recent):  Temp: 97.9 °F (36.6 °C) (08/16/20 0414)  Pulse: 79 (08/16/20 0414)  Resp: 18 (08/16/20 0414)  BP: 127/70 (08/16/20 0414)  SpO2: 100 % (08/16/20 0414) Vital Signs (24h Range):  Temp:  [97.9 °F (36.6 °C)-98.2 °F (36.8 °C)] 97.9 °F (36.6 °C)  Pulse:  [79-93] 79  Resp:  [17-18] 18  SpO2:  [98 %-100 %] 100 %  BP: (127-146)/(70-94) 127/70     Weight: 86.5 kg (190 lb 11.2 oz)  Body mass index is 36.03 kg/m².        Intake/Output Summary (Last 24 hours) at 8/16/2020 0706  Last data filed at 8/16/2020 0600  Gross per 24 hour   Intake 311.67 ml   Output --   Net 311.67 ml       Cervical Exam:Deferred     Significant Labs:  Recent Lab Results       08/15/20  2234   08/15/20  2137        Albumin 2.8       Alkaline Phosphatase 145       ALT 45       Anion Gap 12       aPTT 26.3  Comment:  aPTT therapeutic range = 39-69 seconds       AST 35       Baso # 0.02       Basophil% 0.2       BILIRUBIN TOTAL 0.2  Comment:  For infants and newborns, interpretation of results should be based  on gestational age, weight and in agreement with clinical  observations.  Premature Infant recommended reference ranges:  Up to 24 hours.............<8.0 mg/dL  Up to 48 hours............<12.0 mg/dL  3-5 days..................<15.0 mg/dL  6-29 days.................<15.0 mg/dL         BUN, Bld 9       Calcium 9.4       Chloride 107       CO2 18       Creatinine 0.6       Differential Method Automated       eGFR if  >60       eGFR if non  >60  Comment:  Calculation used to obtain the estimated glomerular filtration  rate (eGFR) is  the CKD-EPI equation.          Eos # 0.1       Eosinophil% 0.8       Fibrinogen 688       Glucose 111       Gran # (ANC) 6.2       Gran% 67.8       Group & Rh A NEG       Hematocrit 38.4       Hemoglobin 13.1       Immature Grans (Abs) 0.03  Comment:  Mild elevation in immature granulocytes is non specific and   can be seen in a variety of conditions including stress response,   acute inflammation, trauma and pregnancy. Correlation with other   laboratory and clinical findings is essential.         Immature Granulocytes 0.3       INDIRECT YAAKOV NEG       INR 0.9  Comment:  Coumadin Therapy:  2.0 - 3.0 for INR for all indicators except mechanical heart valves  and antiphospholipid syndromes which should use 2.5 - 3.5.           Comment:  Results are increased in hemolyzed samples.       Lymph # 2.0       Lymph% 22.0       MCH 29.8       MCHC 34.1       MCV 88       Mono # 0.8       Mono% 8.9       MPV 9.7       nRBC 0       Platelets 278       Potassium 3.5       PROTEIN TOTAL 6.7       Protime 9.8       RBC 4.39       RDW 13.4       SARS-CoV-2 RNA, Amplification, Qual   Negative  Comment:  This test utilizes isothermal nucleic acid amplification   technology to detect the SARS-CoV-2 RdRp nucleic acid segment.   The analytical sensitivity (limit of detection) is 125 genome   equivalents/mL.   A POSITIVE result implies infection with the SARS-CoV-2 virus;  the patient is presumed to be contagious.    A NEGATIVE result means that SARS-CoV-2 nucleic acids are not  present above the limit of detection. A NEGATIVE result should be   treated as presumptive. It does not rule out the possibility of   COVID-19 and should not be the sole basis for treatment decisions.   If COVID-19 is strongly suspected based on clinical and exposure   history, re-testing using an alternate molecular assay should be   considered.   This test is only for use under the Food and Drug   Administration s Emergency Use Authorization (EUA).    Commercial kits are provided by [employer]   Performance characteristics of the EUA have been independently  verified by [facility] Department of  Pathology and Laboratory Medicine.   _________________________________________________________________  The ID NOW COVID-19 Letter of Authorization, along with the   authorized Fact Sheet for Healthcare Providers, the authorized Fact  Sheet for Patients, and authorized labeling are available on the FDA   website:  www.fda.gov/MedicalDevices/Safety/EmergencySituations/leb467629.htm       Sodium 137       WBC 9.10             Physical Exam:   Constitutional: She is oriented to person, place, and time. She appears well-developed and well-nourished.    HENT:   Head: Normocephalic and atraumatic.    Eyes: Pupils are equal, round, and reactive to light. EOM are normal.    Neck: Normal range of motion. Neck supple.    Cardiovascular: Normal rate and regular rhythm.     Pulmonary/Chest: Effort normal.        Abdominal: Soft.             Musculoskeletal: Normal range of motion and moves all extremeties.       Neurological: She is alert and oriented to person, place, and time.    Skin: Skin is warm and dry.        Assessment/Plan:     34 y.o. female  at 23w2d for:    * Transaminitis  - AST/ALT 51/52 > 45/35  - Profound itching including palms and soles  - Concern for possible hepatitis versus early preeclampsia versus cholestasis of pregnancy  - Continue blood pressure monitoring  - CBC, LDH wnl   - P/C ratio 0.15; 24 hour urine pending.   - Fasting bile acids pending  - Daily heart tones       Other specified hypothyroidism  - Synthroid continued inpatient          Fariba Gutierrez MD  Obstetrics  [facility]

## 2020-08-16 NOTE — HPI
Shanae Vo is a 34 y.o. F at 23w1d presents to the OB ED after being called by MFM secondary to lab abnormalities.  Patient has history of profound itching this pregnancy.  States the itching is all over her body, including palms and soles.  Not associated with a rash.  Also states that she has had mild nausea but no vomiting.  She says she has noticed jaundice of her eyes.  She denies right upper quadrant abdominal pain.  MFM noted elevations in her LFTs.  Would like patient admitted for observation and further lab workup.      Of note primary OBGYN noted elevated bile acids at last visit.  However on further investigation it was noted these were not taken while fasting.  Please see below for last MFM note from clinic.  Concern for cholestasis of pregnancy versus hepatitis versus early preeclampsia.     Previous HPI:   The patient was referred from Dr Vega for evaluation of elevated bile acids. The patient reports she started having severe itching of her palms and soles about 1 month ago. It is worse at night time. It now also involves her entire body. She also had elevated LFTs. She also had an abnormal sequential screen with an elevated HCG and AFP level.  She denies a history of liver disease, hepatitis, RUQ pain. She denies IVDU or alcohol intake. She has never had a blood transfusion.  She reports the bile acids were not checked while fasting. She indicated she is only using topical cream for itching and did not have persistent itching during our consult. She denies having any rash. She uses free and clear detergents.

## 2020-08-16 NOTE — ED TRIAGE NOTES
Pt presents to [facility] from MFM clinic. Pt presents with elevated liver enzyme labs. Pt denies contractions, leaking of vaginal fluids, or vaginal bleeding. +FM. VSS. Afebrile. Pt placed in [facility] room 2. Bed in lowest position, call light within reach. MD notified of patient arrival.

## 2020-08-16 NOTE — ASSESSMENT & PLAN NOTE
- AST/ALT mildly elevated at 51/52  - Profound itching including palms and soles  - Concern for possible hepatitis versus early preeclampsia versus cholestasis of pregnancy  - Will admit for observation to antepartum  - CBC, CMP, LDH, coags pending  - Will order 24 hr urine protein secondary to P/C ratio at 0.15  - Will order fasting bile acids in the AM  - Daily heart tones

## 2020-08-16 NOTE — ED PROVIDER NOTES
Encounter Date: 8/15/2020       History     Chief Complaint   Patient presents with    Hypertension     Shanae Vo is a 34 y.o. F at 23w1d presents to the OB ED after being called by MFM secondary to lab abnormalities.  Patient has history of profound itching this pregnancy.  States the itching is all over her body, including palms and soles.  Not associated with a rash.  Also states that she has had mild nausea but no vomiting.  She says she has noticed jaundice of her eyes.  She denies right upper quadrant abdominal pain.  MFM noted elevations in her LFTs.  Would like patient admitted for observation and further lab workup.     Of note primary OBGYN noted elevated bile acids at last visit.  However on further investigation it was noted these were not taken while fasting.  Please see below for last MFM note from clinic.  Concern for cholestasis of pregnancy versus hepatitis versus early preeclampsia.    Previous HPI:   The patient was referred from Dr Vega for evaluation of elevated bile acids. The patient reports she started having severe itching of her palms and soles about 1 month ago. It is worse at night time. It now also involves her entire body. She also had elevated LFTs. She also had an abnormal sequential screen with an elevated HCG and AFP level.  She denies a history of liver disease, hepatitis, RUQ pain. She denies IVDU or alcohol intake. She has never had a blood transfusion.  She reports the bile acids were not checked while fasting. She indicated she is only using topical cream for itching and did not have persistent itching during our consult. She denies having any rash. She uses free and clear detergents.        Review of patient's allergies indicates:   Allergen Reactions    Shrimp Nausea And Vomiting     Past Medical History:   Diagnosis Date    Finger infection     right finger- patient will go to urgent care and take care of it    Thyroid disease     hypothyroid     Past  Surgical History:   Procedure Laterality Date    DIAGNOSTIC LAPAROSCOPY N/A 10/28/2019    Procedure: LAPAROSCOPY, DIAGNOSTIC;  Surgeon: Luis Armando Vega MD;  Location: [facility] OR;  Service: OB/GYN;  Laterality: N/A;    LAPAROSCOPIC SURGICAL REMOVAL OF CYST OF OVARY Left 10/28/2019    Procedure: EXCISION, CYST, OVARY, LAPAROSCOPIC, LEFT;  Surgeon: Luis Armando Vega MD;  Location: [facility] OR;  Service: OB/GYN;  Laterality: Left;    WISDOM TOOTH EXTRACTION       History reviewed. No pertinent family history.  Social History     Tobacco Use    Smoking status: Never Smoker   Substance Use Topics    Alcohol use: Yes     Comment: social    Drug use: Never     Review of Systems   Constitutional: Negative for chills and fever.   HENT: Negative for facial swelling.    Eyes: Negative for visual disturbance.   Respiratory: Negative for chest tightness and shortness of breath.    Cardiovascular: Negative for chest pain.   Gastrointestinal: Negative for abdominal pain, nausea and vomiting.   Genitourinary: Negative for dysuria, vaginal bleeding and vaginal discharge.   Skin: Negative.    Allergic/Immunologic:        Profound whole body itching.    Neurological: Negative for headaches.   Psychiatric/Behavioral: Negative.        Physical Exam     Initial Vitals   BP Pulse Resp Temp SpO2   08/15/20 2148 08/15/20 2144 08/15/20 2204 08/15/20 2204 08/15/20 2144   137/87 92 17 98.1 °F (36.7 °C) 99 %      MAP       --                Physical Exam    Vitals reviewed.  Constitutional: She appears well-developed and well-nourished.   HENT:   Head: Normocephalic and atraumatic.   Eyes: EOM are normal.   Neck: Normal range of motion. Neck supple.   Cardiovascular: Normal rate and regular rhythm.   Pulmonary/Chest: Breath sounds normal.   Abdominal: Soft. There is no abdominal tenderness. There is no rebound and no guarding.   Musculoskeletal: Normal range of motion.   Neurological: She is alert and oriented to person, place, and  time.   Skin: Skin is warm and dry.   Psychiatric: She has a normal mood and affect.         ED Course   Procedures  Labs Reviewed   SARS-COV-2 RNA AMPLIFICATION, QUAL   CBC W/ AUTO DIFFERENTIAL   COMPREHENSIVE METABOLIC PANEL   LACTATE DEHYDROGENASE   PROTEIN, URINE, TIMED   PROTIME-INR   APTT   FIBRINOGEN   TYPE & SCREEN          Imaging Results    None          Medical Decision Making:   ED Management:  NST shows no decelerations. Does not need to be reactive since < 24 weeks GA.  No contractions.  Labs collected per [facility] recommendations including LDH, coags, CBC/CMP. Also will perform 24 hour urine.   Admit to antepartum service.   Other:   I have discussed this case with another health care provider.       <> Summary of the Discussion: Dr. Fitzgerald                                  Clinical Impression:       ICD-10-CM ICD-9-CM   1. Itching  L29.9 698.9   2. 23 weeks gestation of pregnancy  Z3A.23 V22.2                       Fariba Gutierrez M.D.  OBGYN PGY-2         Fariba Gutierrez MD  Resident  08/15/20 1322

## 2020-08-16 NOTE — SUBJECTIVE & OBJECTIVE
Obstetric HPI:  Patient denies contractions, active fetal movement, absent vaginal bleeding , absent loss of fluid      Objective:     Vital Signs (Most Recent):  Temp: 97.9 °F (36.6 °C) (08/16/20 0414)  Pulse: 79 (08/16/20 0414)  Resp: 18 (08/16/20 0414)  BP: 127/70 (08/16/20 0414)  SpO2: 100 % (08/16/20 0414) Vital Signs (24h Range):  Temp:  [97.9 °F (36.6 °C)-98.2 °F (36.8 °C)] 97.9 °F (36.6 °C)  Pulse:  [79-93] 79  Resp:  [17-18] 18  SpO2:  [98 %-100 %] 100 %  BP: (127-146)/(70-94) 127/70     Weight: 86.5 kg (190 lb 11.2 oz)  Body mass index is 36.03 kg/m².        Intake/Output Summary (Last 24 hours) at 8/16/2020 0706  Last data filed at 8/16/2020 0600  Gross per 24 hour   Intake 311.67 ml   Output --   Net 311.67 ml       Cervical Exam:Deferred     Significant Labs:  Recent Lab Results       08/15/20  2234   08/15/20  2137        Albumin 2.8       Alkaline Phosphatase 145       ALT 45       Anion Gap 12       aPTT 26.3  Comment:  aPTT therapeutic range = 39-69 seconds       AST 35       Baso # 0.02       Basophil% 0.2       BILIRUBIN TOTAL 0.2  Comment:  For infants and newborns, interpretation of results should be based  on gestational age, weight and in agreement with clinical  observations.  Premature Infant recommended reference ranges:  Up to 24 hours.............<8.0 mg/dL  Up to 48 hours............<12.0 mg/dL  3-5 days..................<15.0 mg/dL  6-29 days.................<15.0 mg/dL         BUN, Bld 9       Calcium 9.4       Chloride 107       CO2 18       Creatinine 0.6       Differential Method Automated       eGFR if  >60       eGFR if non  >60  Comment:  Calculation used to obtain the estimated glomerular filtration  rate (eGFR) is the CKD-EPI equation.          Eos # 0.1       Eosinophil% 0.8       Fibrinogen 688       Glucose 111       Gran # (ANC) 6.2       Gran% 67.8       Group & Rh A NEG       Hematocrit 38.4       Hemoglobin 13.1       Immature Grans (Abs)  0.03  Comment:  Mild elevation in immature granulocytes is non specific and   can be seen in a variety of conditions including stress response,   acute inflammation, trauma and pregnancy. Correlation with other   laboratory and clinical findings is essential.         Immature Granulocytes 0.3       INDIRECT YAAKOV NEG       INR 0.9  Comment:  Coumadin Therapy:  2.0 - 3.0 for INR for all indicators except mechanical heart valves  and antiphospholipid syndromes which should use 2.5 - 3.5.           Comment:  Results are increased in hemolyzed samples.       Lymph # 2.0       Lymph% 22.0       MCH 29.8       MCHC 34.1       MCV 88       Mono # 0.8       Mono% 8.9       MPV 9.7       nRBC 0       Platelets 278       Potassium 3.5       PROTEIN TOTAL 6.7       Protime 9.8       RBC 4.39       RDW 13.4       SARS-CoV-2 RNA, Amplification, Qual   Negative  Comment:  This test utilizes isothermal nucleic acid amplification   technology to detect the SARS-CoV-2 RdRp nucleic acid segment.   The analytical sensitivity (limit of detection) is 125 genome   equivalents/mL.   A POSITIVE result implies infection with the SARS-CoV-2 virus;  the patient is presumed to be contagious.    A NEGATIVE result means that SARS-CoV-2 nucleic acids are not  present above the limit of detection. A NEGATIVE result should be   treated as presumptive. It does not rule out the possibility of   COVID-19 and should not be the sole basis for treatment decisions.   If COVID-19 is strongly suspected based on clinical and exposure   history, re-testing using an alternate molecular assay should be   considered.   This test is only for use under the Food and Drug   Administration s Emergency Use Authorization (EUA).   Commercial kits are provided by [employer].   Performance characteristics of the EUA have been independently  verified by [facility] Department of  Pathology and Laboratory Medicine.    _________________________________________________________________  The ID NOW COVID-19 Letter of Authorization, along with the   authorized Fact Sheet for Healthcare Providers, the authorized Fact  Sheet for Patients, and authorized labeling are available on the FDA   website:  www.fda.gov/MedicalDevices/Safety/EmergencySituations/zow675957.htm       Sodium 137       WBC 9.10             Physical Exam:   Constitutional: She is oriented to person, place, and time. She appears well-developed and well-nourished.    HENT:   Head: Normocephalic and atraumatic.    Eyes: Pupils are equal, round, and reactive to light. EOM are normal.    Neck: Normal range of motion. Neck supple.    Cardiovascular: Normal rate and regular rhythm.     Pulmonary/Chest: Effort normal.        Abdominal: Soft.             Musculoskeletal: Normal range of motion and moves all extremeties.       Neurological: She is alert and oriented to person, place, and time.    Skin: Skin is warm and dry.

## 2020-08-16 NOTE — SUBJECTIVE & OBJECTIVE
Obstetric HPI:  Patient denies contractions, active fetal movement, No vaginal bleeding , No loss of fluid     OB History    Para Term  AB Living   1 0 0 0 0 0   SAB TAB Ectopic Multiple Live Births   0 0 0 0 0      # Outcome Date GA Lbr Judson/2nd Weight Sex Delivery Anes PTL Lv   1 Current              Past Medical History:   Diagnosis Date    Finger infection     right finger- patient will go to urgent care and take care of it    Thyroid disease     hypothyroid     Past Surgical History:   Procedure Laterality Date    DIAGNOSTIC LAPAROSCOPY N/A 10/28/2019    Procedure: LAPAROSCOPY, DIAGNOSTIC;  Surgeon: Luis Armando Vega MD;  Location: [facility] OR;  Service: OB/GYN;  Laterality: N/A;    LAPAROSCOPIC SURGICAL REMOVAL OF CYST OF OVARY Left 10/28/2019    Procedure: EXCISION, CYST, OVARY, LAPAROSCOPIC, LEFT;  Surgeon: Luis Armando Vega MD;  Location: [facility] OR;  Service: OB/GYN;  Laterality: Left;    WISDOM TOOTH EXTRACTION         PTA Medications   Medication Sig    levothyroxine (SYNTHROID) 50 MCG tablet levothyroxine 50 mcg tablet   TAKE 1 TABLET BY MOUTH ONCE DAILY    levothyroxine (TIROSINT) 50 mcg Cap Take 1 tablet by mouth once daily.    prenatal vit-iron fum-folic ac (RIGHT STEP PRENATAL VITAMINS) 27 mg iron- 0.8 mg Tab Take 1 tablet by mouth once daily.     27 mg iron- 1 mg Tab        Review of patient's allergies indicates:   Allergen Reactions    Shrimp Nausea And Vomiting        Family History     None        Tobacco Use    Smoking status: Never Smoker   Substance and Sexual Activity    Alcohol use: Yes     Comment: social    Drug use: Never    Sexual activity: Not on file     Review of Systems   Constitutional: Negative for chills and fever.   Respiratory: Negative for shortness of breath.    Cardiovascular: Negative for chest pain.   Gastrointestinal: Negative for abdominal pain.   Endocrine: Negative for diabetes.   Genitourinary: Negative for dyspareunia, dysuria,  vaginal bleeding, vaginal discharge and vaginal pain.   Integumentary:  Negative for acne.   Neurological: Negative for syncope.   Hematological:        Widespread itchiness, including palms and soles      Objective:     Vital Signs (Most Recent):  Temp: 98.1 °F (36.7 °C) (08/15/20 2204)  Pulse: 86 (08/15/20 2244)  Resp: 17 (08/15/20 2204)  BP: (!) 145/94 (08/15/20 2244)  SpO2: 100 % (08/15/20 2209) Vital Signs (24h Range):  Temp:  [98.1 °F (36.7 °C)] 98.1 °F (36.7 °C)  Pulse:  [86-93] 86  Resp:  [17] 17  SpO2:  [98 %-100 %] 100 %  BP: (137-145)/(73-94) 145/94     Weight: 86.5 kg (190 lb 11.2 oz)  Body mass index is 36.03 kg/m².    FHT confirmed. No deceleration on tracing.     Physical Exam:   Constitutional: She is oriented to person, place, and time. She appears well-developed and well-nourished.    HENT:   Head: Normocephalic and atraumatic.    Eyes: Pupils are equal, round, and reactive to light. EOM are normal.    Neck: Normal range of motion. Neck supple.    Cardiovascular: Normal rate and regular rhythm.     Pulmonary/Chest: Effort normal.        Abdominal: Soft. There is no abdominal tenderness. There is no rebound and no guarding.             Musculoskeletal: Normal range of motion and moves all extremeties.       Neurological: She is alert and oriented to person, place, and time.    Skin: Skin is warm and dry.        Cervix: Deferred     Significant Labs:  Lab Results   Component Value Date    HEPBSAG Negative 08/06/2020

## 2020-08-16 NOTE — ASSESSMENT & PLAN NOTE
- AST/ALT 51/52 > 45/35  - Profound itching including palms and soles  - Concern for possible hepatitis versus early preeclampsia versus cholestasis of pregnancy  - Continue blood pressure monitoring  - CBC, LDH wnl   - P/C ratio 0.15; 24 hour urine pending.   - Fasting bile acids pending  - Daily heart tones

## 2020-08-16 NOTE — ASSESSMENT & PLAN NOTE
- Synthroid continued inpatient   [facility]  [address] [place] [id number] [phone]               Thank you for choosing us for your health care visit with [facility],[address], PT.   We are glad to serve you and happy to provide you with this summary o Commonly known as:  ZOFRAN           Prochlorperazine Maleate 10 mg tablet   Take 1 tablet (10 mg total) by mouth every 6 (six) hours as needed for Nausea.    Commonly known as:  COMPAZINE           Rivaroxaban 20 MG Tabs   Take 1 tablet by mouth daily with

## 2020-08-16 NOTE — ANESTHESIA PREPROCEDURE EVALUATION
[facility]  Anesthesia Pre-Operative Evaluation         Patient Name: Shanae Vo  YOB: 1985  MRN: [id number]    SUBJECTIVE:     Pre-operative evaluation for * No surgery found *     2020    Shanae Vo is a 34 y.o. female  @ 23w1d w/ a significant PMHx of hypothyroidism who presents w/ concern for transaminitis associated w/ profound itching.    Pt presented with profound itching including palms and soles. noted elevated bile acids in recent clinic visit.    OB History    Para Term  AB Living   1             SAB TAB Ectopic Multiple Live Births                  # Outcome Date GA Lbr Judson/2nd Weight Sex Delivery Anes PTL Lv   1 Current                  Patient now presents for the above procedure(s).      LDA: None documented.       Peripheral IV - Single Lumen 08/15/20 2220 18 G Left;Posterior Wrist (Active)   Site Assessment Dry;Clean;Intact;No redness;No swelling 20 0000   Line Status Infusing 20 0000   Dressing Status Clean;Dry;Intact 20 0000   Dressing Intervention Integrity maintained 20 0000   Number of days: 0       Prev airway: Present Prior to Hospital Arrival?: No; Placement Date: 10/28/19; Placement Time: 1224; Method of Intubation: Direct laryngoscopy; Inserted by: CRNA; Airway Device: Endotracheal Tube; Mask Ventilation: Easy; Intubated: Postinduction; Blade: Siri #3; Airway Device Size: 7.5; Style: Cuffed; Cuff Inflation: Minimal occlusive pressure; Inflation Amount: 3; Placement Verified By: Auscultation, Capnometry, ETT Condensation; Grade: Grade I; Complicating Factors: None      Drips: None documented.   sodium chloride 0.9% 50 mL/hr at 08/15/20 2346       Patient Active Problem List   Diagnosis    Endometrioma of ovary    Transaminitis    Itching    Other specified hypothyroidism       Review of patient's allergies indicates:   Allergen Reactions    Shrimp Nausea And Vomiting       Current  Inpatient Medications:   levothyroxine  50 mcg Oral Before breakfast    prenatal vitamin  1 tablet Oral Daily       No current facility-administered medications on file prior to encounter.      Current Outpatient Medications on File Prior to Encounter   Medication Sig Dispense Refill    levothyroxine (SYNTHROID) 50 MCG tablet levothyroxine 50 mcg tablet   TAKE 1 TABLET BY MOUTH ONCE DAILY      levothyroxine (TIROSINT) 50 mcg Cap Take 1 tablet by mouth once daily.      prenatal vit-iron fum-folic ac (RIGHT STEP PRENATAL VITAMINS) 27 mg iron- 0.8 mg Tab Take 1 tablet by mouth once daily.       27 mg iron- 1 mg Tab          Past Surgical History:   Procedure Laterality Date    DIAGNOSTIC LAPAROSCOPY N/A 10/28/2019    Procedure: LAPAROSCOPY, DIAGNOSTIC;  Surgeon: Luis Armando Vega MD;  Location: [facility] OR;  Service: OB/GYN;  Laterality: N/A;    LAPAROSCOPIC SURGICAL REMOVAL OF CYST OF OVARY Left 10/28/2019    Procedure: EXCISION, CYST, OVARY, LAPAROSCOPIC, LEFT;  Surgeon: Luis Armando Vega MD;  Location: [facility] OR;  Service: OB/GYN;  Laterality: Left;    WISDOM TOOTH EXTRACTION         Social History     Socioeconomic History    Marital status:      Spouse name: Not on file    Number of children: Not on file    Years of education: Not on file    Highest education level: Not on file   Occupational History    Not on file   Social Needs    Financial resource strain: Not on file    Food insecurity     Worry: Not on file     Inability: Not on file    Transportation needs     Medical: Not on file     Non-medical: Not on file   Tobacco Use    Smoking status: Never Smoker   Substance and Sexual Activity    Alcohol use: Yes     Comment: social    Drug use: Never    Sexual activity: Not on file   Lifestyle    Physical activity     Days per week: Not on file     Minutes per session: Not on file    Stress: Not on file   Relationships    Social connections     Talks on phone: Not on file      Gets together: Not on file     Attends Church service: Not on file     Active member of club or organization: Not on file     Attends meetings of clubs or organizations: Not on file     Relationship status: Not on file   Other Topics Concern    Not on file   Social History Narrative    Not on file       OBJECTIVE:     Vital Signs Range (Last 24H):  Temp:  [36.6 °C (97.9 °F)-36.8 °C (98.2 °F)]   Pulse:  [79-93]   Resp:  [17-18]   BP: (127-146)/(70-94)   SpO2:  [98 %-100 %]       Significant Labs:  Lab Results   Component Value Date    WBC 9.10 08/15/2020    HGB 13.1 08/15/2020    HCT 38.4 08/15/2020     08/15/2020    ALT 45 (H) 08/15/2020    AST 35 08/15/2020     08/15/2020    K 3.5 08/15/2020     08/15/2020    CREATININE 0.6 08/15/2020    BUN 9 08/15/2020    CO2 18 (L) 08/15/2020    INR 0.9 08/15/2020       Diagnostic Studies: No relevant studies.    EKG:   No results found for this or any previous visit.    2D ECHO:  TTE:  No results found for this or any previous visit.    ALBERTINA:  No results found for this or any previous visit.    ASSESSMENT/PLAN:                                                                                                                  08/16/2020  Shanae Vo is a 34 y.o., female.    Anesthesia Evaluation    I have reviewed the Patient Summary Reports.    I have reviewed the Nursing Notes.    I have reviewed the Medications.     Review of Systems  Anesthesia Hx:  No problems with previous Anesthesia  History of prior surgery of interest to airway management or planning: Denies Family Hx of Anesthesia complications.   Denies Personal Hx of Anesthesia complications.   Social:  Non-Smoker    Hematology/Oncology:     Oncology Normal     Cardiovascular:  Cardiovascular Normal     Pulmonary:  Pulmonary Normal    Renal/:  Renal/ Normal     Hepatic/GI:  Hepatic/GI Normal    Musculoskeletal:  Musculoskeletal Normal    Neurological:  Neurology Normal     Endocrine:   Hypothyroidism    Psych:  Psychiatric Normal           Physical Exam  General:  Well nourished    Airway/Jaw/Neck:  Airway Findings: Mouth Opening: Normal Tongue: Normal  General Airway Assessment: Adult  Mallampati: I  TM Distance: Normal, at least 6 cm      Dental:  Dental Findings: In tact        Mental Status:  Mental Status Findings:  Cooperative, Alert and Oriented         Anesthesia Plan  Type of Anesthesia, risks & benefits discussed:  Anesthesia Type:  general, epidural, CSE, spinal  Patient's Preference:   Intra-op Monitoring Plan: standard ASA monitors  Intra-op Monitoring Plan Comments:   Post Op Pain Control Plan: multimodal analgesia  Post Op Pain Control Plan Comments:   Induction:   IV  Beta Blocker:  Patient is not currently on a Beta-Blocker (No further documentation required).       Informed Consent: Patient understands risks and agrees with Anesthesia plan.  Questions answered. Anesthesia consent signed with patient.  ASA Score: 2     Day of Surgery Review of History & Physical:    H&P update referred to the provider.         Ready For Surgery From Anesthesia Perspective.

## 2020-08-16 NOTE — HOSPITAL COURSE
08/15/2020 - Admitted for monitoring, further lab testing, and 24 hour urine. Concern for hepatitis vs cholestasis of pregnancy vs early development of PreE.   08/16/2020 - 24 hour urine pending. Bile Acids pending. All coags normal. Transaminitis currently resolved.   08/17/2020 - 24 hour tlcl. Bile acids still pending

## 2020-08-16 NOTE — H&P
[facility]  Obstetrics  History & Physical    Patient Name: Shanae Vo  MRN: [id number]  Admission Date: 8/15/2020  Primary Care Provider: Primary Doctor No    Subjective:     Principal Problem:Transaminitis    History of Present Illness:  Shanae Vo is a 34 y.o. F at 23w1d presents to the OB ED after being called by MFM secondary to lab abnormalities.  Patient has history of profound itching this pregnancy.  States the itching is all over her body, including palms and soles.  Not associated with a rash.  Also states that she has had mild nausea but no vomiting.  She says she has noticed jaundice of her eyes.  She denies right upper quadrant abdominal pain.  MFM noted elevations in her LFTs.  Would like patient admitted for observation and further lab workup.      Of note primary OBGYN noted elevated bile acids at last visit.  However on further investigation it was noted these were not taken while fasting.  Please see below for last MFM note from clinic.  Concern for cholestasis of pregnancy versus hepatitis versus early preeclampsia.     Previous HPI:   The patient was referred from Dr Vega for evaluation of elevated bile acids. The patient reports she started having severe itching of her palms and soles about 1 month ago. It is worse at night time. It now also involves her entire body. She also had elevated LFTs. She also had an abnormal sequential screen with an elevated HCG and AFP level.  She denies a history of liver disease, hepatitis, RUQ pain. She denies IVDU or alcohol intake. She has never had a blood transfusion.  She reports the bile acids were not checked while fasting. She indicated she is only using topical cream for itching and did not have persistent itching during our consult. She denies having any rash. She uses free and clear detergents.    Obstetric HPI:  Patient denies contractions, active fetal movement, No vaginal bleeding , No loss of fluid     OB  History    Para Term  AB Living   1 0 0 0 0 0   SAB TAB Ectopic Multiple Live Births   0 0 0 0 0      # Outcome Date GA Lbr Judson/2nd Weight Sex Delivery Anes PTL Lv   1 Current              Past Medical History:   Diagnosis Date    Finger infection     right finger- patient will go to urgent care and take care of it    Thyroid disease     hypothyroid     Past Surgical History:   Procedure Laterality Date    DIAGNOSTIC LAPAROSCOPY N/A 10/28/2019    Procedure: LAPAROSCOPY, DIAGNOSTIC;  Surgeon: Luis Armando Vega MD;  Location: [facility] OR;  Service: OB/GYN;  Laterality: N/A;    LAPAROSCOPIC SURGICAL REMOVAL OF CYST OF OVARY Left 10/28/2019    Procedure: EXCISION, CYST, OVARY, LAPAROSCOPIC, LEFT;  Surgeon: Luis Armando Vega MD;  Location: [facility] OR;  Service: OB/GYN;  Laterality: Left;    WISDOM TOOTH EXTRACTION         PTA Medications   Medication Sig    levothyroxine (SYNTHROID) 50 MCG tablet levothyroxine 50 mcg tablet   TAKE 1 TABLET BY MOUTH ONCE DAILY    levothyroxine (TIROSINT) 50 mcg Cap Take 1 tablet by mouth once daily.    prenatal vit-iron fum-folic ac (RIGHT STEP PRENATAL VITAMINS) 27 mg iron- 0.8 mg Tab Take 1 tablet by mouth once daily.     27 mg iron- 1 mg Tab        Review of patient's allergies indicates:   Allergen Reactions    Shrimp Nausea And Vomiting        Family History     None        Tobacco Use    Smoking status: Never Smoker   Substance and Sexual Activity    Alcohol use: Yes     Comment: social    Drug use: Never    Sexual activity: Not on file     Review of Systems   Constitutional: Negative for chills and fever.   Respiratory: Negative for shortness of breath.    Cardiovascular: Negative for chest pain.   Gastrointestinal: Negative for abdominal pain.   Endocrine: Negative for diabetes.   Genitourinary: Negative for dyspareunia, dysuria, vaginal bleeding, vaginal discharge and vaginal pain.   Integumentary:  Negative for acne.   Neurological:  Negative for syncope.   Hematological:        Widespread itchiness, including palms and soles      Objective:     Vital Signs (Most Recent):  Temp: 98.1 °F (36.7 °C) (08/15/20 2204)  Pulse: 86 (08/15/20 2244)  Resp: 17 (08/15/20 2204)  BP: (!) 145/94 (08/15/20 2244)  SpO2: 100 % (08/15/20 2209) Vital Signs (24h Range):  Temp:  [98.1 °F (36.7 °C)] 98.1 °F (36.7 °C)  Pulse:  [86-93] 86  Resp:  [17] 17  SpO2:  [98 %-100 %] 100 %  BP: (137-145)/(73-94) 145/94     Weight: 86.5 kg (190 lb 11.2 oz)  Body mass index is 36.03 kg/m².    FHT confirmed. No deceleration on tracing.     Physical Exam:   Constitutional: She is oriented to person, place, and time. She appears well-developed and well-nourished.    HENT:   Head: Normocephalic and atraumatic.    Eyes: Pupils are equal, round, and reactive to light. EOM are normal.    Neck: Normal range of motion. Neck supple.    Cardiovascular: Normal rate and regular rhythm.     Pulmonary/Chest: Effort normal.        Abdominal: Soft. There is no abdominal tenderness. There is no rebound and no guarding.             Musculoskeletal: Normal range of motion and moves all extremeties.       Neurological: She is alert and oriented to person, place, and time.    Skin: Skin is warm and dry.        Cervix: Deferred     Significant Labs:  Lab Results   Component Value Date    HEPBSAG Negative 2020     Assessment/Plan:     34 y.o. female  at 23w1d for:    * Transaminitis  - AST/ALT mildly elevated at 51/52  - Profound itching including palms and soles  - Concern for possible hepatitis versus early preeclampsia versus cholestasis of pregnancy  - Will admit for observation to antepartum  - CBC, CMP, LDH, coags pending  - Will order 24 hr urine protein secondary to P/C ratio at 0.15  - Will order fasting bile acids in the AM  - Daily heart tones    Other specified hypothyroidism  - Synthroid continued inpatient        Fariba Gutierrez MD  Obstetrics  [facility]

## 2020-08-17 ENCOUNTER — DOCUMENTATION ONLY (OUTPATIENT)
Dept: MATERNAL FETAL MEDICINE | Facility: CLINIC | Age: 35
End: 2020-08-17

## 2020-08-17 ENCOUNTER — TELEPHONE (OUTPATIENT)
Dept: MATERNAL FETAL MEDICINE | Facility: CLINIC | Age: 35
End: 2020-08-17

## 2020-08-17 ENCOUNTER — TELEPHONE (OUTPATIENT)
Dept: HEPATOLOGY | Facility: CLINIC | Age: 35
End: 2020-08-17

## 2020-08-17 VITALS
SYSTOLIC BLOOD PRESSURE: 135 MMHG | HEART RATE: 84 BPM | BODY MASS INDEX: 36 KG/M2 | WEIGHT: 190.69 LBS | HEIGHT: 61 IN | DIASTOLIC BLOOD PRESSURE: 79 MMHG | TEMPERATURE: 98 F | RESPIRATION RATE: 18 BRPM | OXYGEN SATURATION: 100 %

## 2020-08-17 DIAGNOSIS — R74.8 ELEVATED LIVER ENZYMES: Primary | ICD-10-CM

## 2020-08-17 PROBLEM — O13.2 GESTATIONAL HYPERTENSION, SECOND TRIMESTER: Status: ACTIVE | Noted: 2020-08-17

## 2020-08-17 PROBLEM — E03.9 HYPOTHYROIDISM: Status: ACTIVE | Noted: 2019-10-22

## 2020-08-17 PROBLEM — R74.01 TRANSAMINITIS: Status: RESOLVED | Noted: 2020-08-15 | Resolved: 2020-08-17

## 2020-08-17 PROCEDURE — 99238 HOSP IP/OBS DSCHRG MGMT 30/<: CPT | Mod: ,,, | Performed by: OBSTETRICS & GYNECOLOGY

## 2020-08-17 PROCEDURE — 99238 PR HOSPITAL DISCHARGE DAY,<30 MIN: ICD-10-PCS | Mod: ,,, | Performed by: OBSTETRICS & GYNECOLOGY

## 2020-08-17 PROCEDURE — 25000003 PHARM REV CODE 250: Performed by: STUDENT IN AN ORGANIZED HEALTH CARE EDUCATION/TRAINING PROGRAM

## 2020-08-17 RX ORDER — URSODIOL 300 MG/1
300 CAPSULE ORAL 2 TIMES DAILY
Qty: 60 CAPSULE | Refills: 11 | Status: SHIPPED | OUTPATIENT
Start: 2020-08-17 | End: 2020-08-17 | Stop reason: HOSPADM

## 2020-08-17 RX ORDER — HYDROXYZINE HYDROCHLORIDE 10 MG/1
10 TABLET, FILM COATED ORAL 3 TIMES DAILY PRN
Qty: 60 TABLET | Refills: 2 | Status: ON HOLD | OUTPATIENT
Start: 2020-08-17 | End: 2020-09-21

## 2020-08-17 RX ADMIN — LEVOTHYROXINE SODIUM 50 MCG: 25 TABLET ORAL at 06:08

## 2020-08-17 NOTE — ASSESSMENT & PLAN NOTE
- AST/ALT 51/52 > 45/35  - Profound itching including palms and soles; stable  - Concern for possible hepatitis versus early preeclampsia versus cholestasis of pregnancy  - Continue blood pressure monitoring  - CBC, LDH wnl   - P/C ratio 0.15; 24 hour urine TLTC  - Fasting bile acids pending  - Daily heart tones

## 2020-08-17 NOTE — PROGRESS NOTES
DC instructions reviewed with patient and spouse. Atarax teaching provided, pt verbalized understanding. All questions answered. IV removed, pressure applied, dressed with gauze and silk tape. Escorted to garage for DC by RN.

## 2020-08-17 NOTE — ASSESSMENT & PLAN NOTE
- AST/ALT 51/52 > 45/35  - Profound itching including palms and soles; stable   - Will discharge with ursodiol   - Concern for possible hepatitis versus early preeclampsia versus cholestasis of pregnancy   - BP normal to mild range; 24hr urine TLCL. Other labs wnl   - Now meets criteria for gHTN  - Hep B and C labs neg   Hep A IgG and IgM pending  - Continue blood pressure monitoring  - CBC, LDH wnl   - Fasting bile acids pending; will follow up  - Daily heart tones

## 2020-08-17 NOTE — SUBJECTIVE & OBJECTIVE
Obstetric HPI:  Patient denies contractions, active fetal movement, absent vaginal bleeding , absent loss of fluid      Objective:     Vital Signs (Most Recent):  Temp: 99 °F (37.2 °C) (08/17/20 0400)  Pulse: 84 (08/17/20 0400)  Resp: 18 (08/17/20 0400)  BP: (!) 144/75 (08/17/20 0400)  SpO2: 100 % (08/17/20 0400) Vital Signs (24h Range):  Temp:  [97.9 °F (36.6 °C)-99 °F (37.2 °C)] 99 °F (37.2 °C)  Pulse:  [77-86] 84  Resp:  [18-19] 18  SpO2:  [100 %] 100 %  BP: (122-150)/(70-80) 144/75     Weight: 86.5 kg (190 lb 11.2 oz)  Body mass index is 36.03 kg/m².        Intake/Output Summary (Last 24 hours) at 8/17/2020 0656  Last data filed at 8/16/2020 0805  Gross per 24 hour   Intake 104.17 ml   Output --   Net 104.17 ml       Cervical Exam:Deferred     Significant Labs:  Recent Lab Results       08/16/20  2303        Urine Protein, Timed Unable to calculate     PROTEIN URINE <7     Urine Collection Duration 24     Urine Volume 3500           Physical Exam:   Constitutional: She is oriented to person, place, and time. She appears well-developed and well-nourished.    HENT:   Head: Normocephalic and atraumatic.    Eyes: Pupils are equal, round, and reactive to light. EOM are normal.    Neck: Normal range of motion. Neck supple.    Cardiovascular: Normal rate and regular rhythm.     Pulmonary/Chest: Effort normal.        Abdominal: Soft.             Musculoskeletal: Normal range of motion and moves all extremeties.       Neurological: She is alert and oriented to person, place, and time.    Skin: Skin is warm and dry.

## 2020-08-17 NOTE — DISCHARGE INSTRUCTIONS
Hydroxyzine  What is this medicine?  HYDROXYZINE (demetrio DROX i zeen) is an antihistamine. This medicine is used to treat allergy symptoms. It is also used to treat anxiety and tension. This medicine can be used with other medicines to induce sleep before surgery.    How should I use this medicine?  Take this medicine by mouth with a full glass of water. Follow the directions on the prescription label. You may take this medicine with food or on an empty stomach. Take your medicine at regular intervals. Do not take your medicine more often than directed.    What side effects may I notice from receiving this medicine?  Side effects that you should report to your doctor or health care professional as soon as possible:  · difficulty passing urine  · fast or irregular heartbeat  · seizures  · slurred speech or confusion  · tremor  Side effects that usually do not require medical attention (report to your doctor or health care professional if they continue or are bothersome):  · constipation  · drowsiness  · fatigue  · headache  · stomach upset  What may interact with this medicine?  · alcohol  · barbiturate medicines for sleep or seizures  · medicines for colds, allergies  · medicines for depression, anxiety, or emotional disturbances  · medicines for pain  · medicines for sleep  · muscle relaxants    What if I miss a dose?  If you miss a dose, take it as soon as you can. If it is almost time for your next dose, take only that dose. Do not take double or extra doses.    Where should I keep my medicine?  Keep out of the reach of children.  Store at room temperature between 15 and 30 degrees C (59 and 86 degrees F). Do not freeze. Protect from light. Throw away any unused medicine after the expiration date.    What should I tell my health care provider before I take this medicine?  They need to know if you have any of these conditions:  · any chronic illness  · diabetes  · difficulty passing urine  · glaucoma  · heart  disease  · kidney disease  · liver disease  · lung disease  · an unusual or allergic reaction to hydroxyzine, cetirizine, other medicines, foods, dyes, or preservatives      What should I watch for while using this medicine?  Tell your doctor or health care professional if your symptoms do not improve.  You may get drowsy or dizzy. Do not drive, use machinery, or do anything that needs mental alertness until you know how this medicine affects you. Do not stand or sit up quickly, especially if you are an older patient. This reduces the risk of dizzy or fainting spells. Alcohol may interfere with the effect of this medicine. Avoid alcoholic drinks.  Your mouth may get dry. Chewing sugarless gum or sucking hard candy, and drinking plenty of water may help. Contact your doctor if the problem does not go away or is severe.  This medicine may cause dry eyes and blurred vision. If you wear contact lenses you may feel some discomfort. Lubricating drops may help. See your eye doctor if the problem does not go away or is severe.  If you are receiving skin tests for allergies, tell your doctor you are using this medicine.  NOTE:This sheet is a summary. It may not cover all possible information. If you have questions about this medicine, talk to your doctor, pharmacist, or health care provider. Copyright© 2017 [employer]

## 2020-08-17 NOTE — TELEPHONE ENCOUNTER
Called the patient to discuss her labs test, no answer. She is being discharged from OB service based on chart review.     Her AST is 35 normal and ALT is 45 (UNL is 44 in our lab). Alkaline phos 145 is secondary to pregnancy. Hypoalbuminemia is secondary to pregnancy hypervolumia. Hepatitis A Ig M/Ig G both are negative.    I will request my staffs to reach out to her later.   Plan to repeat liver function panel in 1 week.

## 2020-08-17 NOTE — TELEPHONE ENCOUNTER
Spoke to patient about negative igg and igm  Pt stated that she just left the hospital and was asking about bile acid result  Informed her that they were still pending

## 2020-08-17 NOTE — DISCHARGE SUMMARY
[facility]  Obstetrics  Discharge Summary      Patient Name: Shanae Vo  MRN: [id number]  Admission Date: 8/15/2020  Hospital Length of Stay: 2 days  Discharge Date and Time:  2020 10:27 AM  Attending Physician: Lisa Barbour MD   Discharging Provider: Florencio Tsang MD   Primary Care Provider: Baldo    HPI: Shanae Vo is a 34 y.o. F at 23w1d presents to the OB ED after being called by MFM secondary to lab abnormalities.  Patient has history of profound itching this pregnancy.  States the itching is all over her body, including palms and soles.  Not associated with a rash.  Also states that she has had mild nausea but no vomiting.  She says she has noticed jaundice of her eyes.  She denies right upper quadrant abdominal pain.  MFM noted elevations in her LFTs.  Would like patient admitted for observation and further lab workup.      Of note primary OBGYN noted elevated bile acids at last visit.  However on further investigation it was noted these were not taken while fasting.  Please see below for last MFM note from clinic.  Concern for cholestasis of pregnancy versus hepatitis versus early preeclampsia.     Previous HPI:   The patient was referred from Dr Vega for evaluation of elevated bile acids. The patient reports she started having severe itching of her palms and soles about 1 month ago. It is worse at night time. It now also involves her entire body. She also had elevated LFTs. She also had an abnormal sequential screen with an elevated HCG and AFP level.  She denies a history of liver disease, hepatitis, RUQ pain. She denies IVDU or alcohol intake. She has never had a blood transfusion.  She reports the bile acids were not checked while fasting. She indicated she is only using topical cream for itching and did not have persistent itching during our consult. She denies having any rash. She uses free and clear detergents.    * No surgery found *      Hospital Course:   08/15/2020 - Admitted for monitoring, further lab testing, and 24 hour urine. Concern for hepatitis vs cholestasis of pregnancy vs early development of PreE.   08/16/2020 - 24 hour urine pending. Bile Acids pending. All coags normal. Transaminitis currently resolved.   08/17/2020 - 24 hour tlcl. Bile acids still pending     At time of discharge itching was stable. Bile acids and Hep A still pending. However, this will not  at this time, so pt okay for follow up as outpatient. Will call pt with results. Atarax for itching at time of discharge. Mild to normal range BP noted; now meets criteria for gHTN.        Final Active Diagnoses:    Diagnosis Date Noted POA    Gestational hypertension, second trimester [O13.2] 08/17/2020 No    23 weeks gestation of pregnancy [Z3A.23]  Not Applicable    Current maternal condition affecting pregnancy [O99.89]  Yes    Itching [L29.9] 08/15/2020 Yes    Hypothyroidism [E03.9] 10/22/2019 Yes      Problems Resolved During this Admission:    Diagnosis Date Noted Date Resolved POA    PRINCIPAL PROBLEM:  Transaminitis [R74.0] 08/15/2020 08/17/2020 Yes    Proteinuria affecting pregnancy in second trimester [O12.12]  08/17/2020 Yes        Significant Diagnostic Studies: Labs: All labs within the past 24 hours have been reviewed    This patient has no babies on file.  Pending Diagnostic Studies:     Procedure Component Value Units Date/Time    Bile acids, cholylglycine [[id number]] Collected: 08/16/20 0430    Order Status: Sent Lab Status: In process Updated: 08/16/20 0924    Specimen: Blood           Discharged Condition: fair    Disposition: Home or Self Care    Follow Up:  Follow-up Information     Luis Armando Vega MD In 1 week.    Specialties: Obstetrics and Gynecology, Gynecology  Why: Follow up from hospital admission  Contact information:  [address] [facility]  [address] [place] [id number] [phone]                 Patient Instructions:       Diet Adult Regular     Pelvic Rest     Notify your health care provider if you experience any of the following:  temperature >100.4     Notify your health care provider if you experience any of the following:  persistent nausea and vomiting or diarrhea     Notify your health care provider if you experience any of the following:  severe uncontrolled pain   Order Comments: Specifically RUQ pain, HA, vision changes, acute SOB     Notify your health care provider if you experience any of the following:  severe persistent headache     Notify your health care provider if you experience any of the following:  worsening rash     Activity as tolerated     Medications:  Current Discharge Medication List      START taking these medications    Details   hydrOXYzine HCL (ATARAX) 10 MG Tab Take 1 tablet (10 mg total) by mouth 3 (three) times daily as needed.  Qty: 60 tablet, Refills: 2         CONTINUE these medications which have NOT CHANGED    Details   levothyroxine (SYNTHROID) 50 MCG tablet levothyroxine 50 mcg tablet   TAKE 1 TABLET BY MOUTH ONCE DAILY      levothyroxine (TIROSINT) 50 mcg Cap Take 1 tablet by mouth once daily.      prenatal vit-iron fum-folic ac (RIGHT STEP PRENATAL VITAMINS) 27 mg iron- 0.8 mg Tab Take 1 tablet by mouth once daily.       27 mg iron- 1 mg Tab              MARTY Tsang MD  OBGYN PGY2

## 2020-08-17 NOTE — PROGRESS NOTES
" Per Dr. Brizuela:  "Her outside hepatitis A was false positive. Her transaminases are borderline. We have done serologies and autoimmune markers - all are negative. US is neg for obstruction. I would avoid liver biopsy in her case of mild transaminase elevation. I will talk to her and monitor her liver tests. Thank you for letting me know. "    Dr. washburn saw patient in house today and does not believe is preeclampsia. Per Dr. Barbour a 24 hour urine at one point was increased at over 500mg but 24 hour urine in house too low to calculate. At this time, does not appear to be hepatitis or cholestasis. I spoke to patient via phone and notified her of this. Reviewed repeat hepatitis A results and normal repeat bile acids. Exact etiology of itching is unknown. They did give her atarax for treatment. No indication for ursodiol. Recommend weekly bp check with primary ob and keep follow up with MFM on 9/16. Patient is at higher risk for preeclampsia.    Patient aware and aware Dr. Brizuela tried to call her and will follow her LFTs.    Asked staff to fax to Dr. Vega.  "

## 2020-08-17 NOTE — PROGRESS NOTES
[facility]  Obstetrics  Antepartum Progress Note    Patient Name: Shanae Vo  MRN: [id number]  Admission Date: 8/15/2020  Hospital Length of Stay: 2 days  Attending Physician: Lisa Barbour MD  Primary Care Provider: Baldo    Subjective:     Principal Problem:Transaminitis    HPI:  Shanae Vo is a 34 y.o. F at 23w1d presents to the OB ED after being called by MFM secondary to lab abnormalities.  Patient has history of profound itching this pregnancy.  States the itching is all over her body, including palms and soles.  Not associated with a rash.  Also states that she has had mild nausea but no vomiting.  She says she has noticed jaundice of her eyes.  She denies right upper quadrant abdominal pain.  MFM noted elevations in her LFTs.  Would like patient admitted for observation and further lab workup.      Of note primary OBGYN noted elevated bile acids at last visit.  However on further investigation it was noted these were not taken while fasting.  Please see below for last MFM note from clinic.  Concern for cholestasis of pregnancy versus hepatitis versus early preeclampsia.     Previous HPI:   The patient was referred from Dr Vega for evaluation of elevated bile acids. The patient reports she started having severe itching of her palms and soles about 1 month ago. It is worse at night time. It now also involves her entire body. She also had elevated LFTs. She also had an abnormal sequential screen with an elevated HCG and AFP level.  She denies a history of liver disease, hepatitis, RUQ pain. She denies IVDU or alcohol intake. She has never had a blood transfusion.  She reports the bile acids were not checked while fasting. She indicated she is only using topical cream for itching and did not have persistent itching during our consult. She denies having any rash. She uses free and clear detergents.    Hospital Course:  08/15/2020 - Admitted for monitoring, further  lab testing, and 24 hour urine. Concern for hepatitis vs cholestasis of pregnancy vs early development of PreE.   2020 - 24 hour urine pending. Bile Acids pending. All coags normal. Transaminitis currently resolved.   2020 - 24 hour tlcl. Bile acids still pending    Obstetric HPI:  Patient denies contractions, active fetal movement, absent vaginal bleeding , absent loss of fluid      Objective:     Vital Signs (Most Recent):  Temp: 99 °F (37.2 °C) (20 0400)  Pulse: 84 (20 0400)  Resp: 18 (20 0400)  BP: (!) 144/75 (20 0400)  SpO2: 100 % (20 040) Vital Signs (24h Range):  Temp:  [97.9 °F (36.6 °C)-99 °F (37.2 °C)] 99 °F (37.2 °C)  Pulse:  [77-86] 84  Resp:  [18-19] 18  SpO2:  [100 %] 100 %  BP: (122-150)/(70-80) 144/75     Weight: 86.5 kg (190 lb 11.2 oz)  Body mass index is 36.03 kg/m².        Intake/Output Summary (Last 24 hours) at 2020 0656  Last data filed at 2020 0805  Gross per 24 hour   Intake 104.17 ml   Output --   Net 104.17 ml       Cervical Exam:Deferred     Significant Labs:  Recent Lab Results       20  2303        Urine Protein, Timed Unable to calculate     PROTEIN URINE <7     Urine Collection Duration 24     Urine Volume 3500           Physical Exam:   Constitutional: She is oriented to person, place, and time. She appears well-developed and well-nourished.    HENT:   Head: Normocephalic and atraumatic.    Eyes: Pupils are equal, round, and reactive to light. EOM are normal.    Neck: Normal range of motion. Neck supple.    Cardiovascular: Normal rate and regular rhythm.     Pulmonary/Chest: Effort normal.        Abdominal: Soft.             Musculoskeletal: Normal range of motion and moves all extremeties.       Neurological: She is alert and oriented to person, place, and time.    Skin: Skin is warm and dry.        Assessment/Plan:     34 y.o. female  at 23w3d for:    Hypothyroidism  - Synthroid continued inpatient    Itching  - AST/ALT  51/52 > 45/35  - Profound itching including palms and soles; stable   - Will discharge with atarax  - Concern for possible hepatitis versus early preeclampsia versus cholestasis of pregnancy   - BP normal to mild range; 24hr urine TLCL. Other labs wnl   - Now meets criteria for gHTN  - Hep B and C labs neg   Hep A IgG and IgM pending  - Continue blood pressure monitoring  - CBC, LDH wnl   - Fasting bile acids pending; will follow up  - Daily heart tones      MARTY Tsang MD  OBGYN PGY2       ----------------------  Patient seen and examined. Denies diarrhea, constipation,fevers, nausea, vomiting Reports itching all over but mostly hands and feet.    Temp:  [97.9 °F (36.6 °C)-99 °F (37.2 °C)] 98.2 °F (36.8 °C)  Pulse:  [77-86] 84  Resp:  [18] 18  SpO2:  [100 %] 100 %  BP: (122-150)/(70-80) 135/79    Reports having a mild illness about one month prior and on 8/7 noted to have Hep A IgM positive at her primary OBGYN.    Currently Hep A IgM and IgG pending along with bile acids.    Fernando Wild MD  PGY 5  Maternal Fetal Medicine Fellow  [facility]    Will dc home with follow up on labs outpatient.

## 2020-08-18 LAB — BILE AC SERPL-SCNC: 4 MCMOL/L

## 2020-08-20 ENCOUNTER — TELEPHONE (OUTPATIENT)
Dept: HEPATOLOGY | Facility: CLINIC | Age: 35
End: 2020-08-20

## 2020-08-24 ENCOUNTER — DOCUMENTATION ONLY (OUTPATIENT)
Dept: MATERNAL FETAL MEDICINE | Facility: CLINIC | Age: 35
End: 2020-08-24

## 2020-08-24 NOTE — PROGRESS NOTES
Dr. Brizuela did not feel that findings were suggestive of EBV/CMV. He indicated if transaminases anh then HSV/EBV/CMV are considerations. Recommend verifying patient history with respect to HSV next visit as not mentioned at visit but in prenatals is area checked that patient or partner with history.

## 2020-08-28 ENCOUNTER — PATIENT MESSAGE (OUTPATIENT)
Dept: HEPATOLOGY | Facility: CLINIC | Age: 35
End: 2020-08-28

## 2020-08-28 ENCOUNTER — LAB VISIT (OUTPATIENT)
Dept: LAB | Facility: OTHER | Age: 35
End: 2020-08-28
Payer: OTHER GOVERNMENT

## 2020-08-28 DIAGNOSIS — R74.8 ELEVATED LIVER ENZYMES: ICD-10-CM

## 2020-08-28 LAB
ALBUMIN SERPL BCP-MCNC: 2.7 G/DL (ref 3.5–5.2)
ALP SERPL-CCNC: 168 U/L (ref 55–135)
ALT SERPL W/O P-5'-P-CCNC: 29 U/L (ref 10–44)
AST SERPL-CCNC: 27 U/L (ref 10–40)
BILIRUB DIRECT SERPL-MCNC: 0.1 MG/DL (ref 0.1–0.3)
BILIRUB SERPL-MCNC: 0.2 MG/DL (ref 0.1–1)
PROT SERPL-MCNC: 6.9 G/DL (ref 6–8.4)

## 2020-08-28 PROCEDURE — 36415 COLL VENOUS BLD VENIPUNCTURE: CPT

## 2020-08-28 PROCEDURE — 80076 HEPATIC FUNCTION PANEL: CPT

## 2020-09-14 PROBLEM — N89.8 VAGINAL DISCHARGE: Status: ACTIVE | Noted: 2020-09-14

## 2020-09-16 ENCOUNTER — INITIAL CONSULT (OUTPATIENT)
Dept: MATERNAL FETAL MEDICINE | Facility: CLINIC | Age: 35
End: 2020-09-16
Attending: OBSTETRICS & GYNECOLOGY
Payer: OTHER GOVERNMENT

## 2020-09-16 ENCOUNTER — PROCEDURE VISIT (OUTPATIENT)
Dept: MATERNAL FETAL MEDICINE | Facility: CLINIC | Age: 35
End: 2020-09-16
Payer: OTHER GOVERNMENT

## 2020-09-16 VITALS
SYSTOLIC BLOOD PRESSURE: 132 MMHG | WEIGHT: 199.31 LBS | SYSTOLIC BLOOD PRESSURE: 128 MMHG | DIASTOLIC BLOOD PRESSURE: 88 MMHG | BODY MASS INDEX: 37.66 KG/M2 | DIASTOLIC BLOOD PRESSURE: 86 MMHG

## 2020-09-16 DIAGNOSIS — O16.3 ELEVATED BLOOD PRESSURE AFFECTING PREGNANCY IN THIRD TRIMESTER, ANTEPARTUM: ICD-10-CM

## 2020-09-16 DIAGNOSIS — O99.891 CURRENT MATERNAL CONDITION AFFECTING PREGNANCY: ICD-10-CM

## 2020-09-16 DIAGNOSIS — Z3A.24 24 WEEKS GESTATION OF PREGNANCY: ICD-10-CM

## 2020-09-16 DIAGNOSIS — Z36.4 ANTENATAL SCREENING FOR FETAL GROWTH RETARDATION USING ULTRASONICS: ICD-10-CM

## 2020-09-16 DIAGNOSIS — O99.280 HYPOTHYROIDISM IN PREGNANCY, ANTEPARTUM: ICD-10-CM

## 2020-09-16 DIAGNOSIS — E03.9 HYPOTHYROIDISM IN PREGNANCY, ANTEPARTUM: ICD-10-CM

## 2020-09-16 DIAGNOSIS — L29.9 ITCHING: ICD-10-CM

## 2020-09-16 DIAGNOSIS — Z36.89 ENCOUNTER FOR ULTRASOUND TO ASSESS FETAL GROWTH: ICD-10-CM

## 2020-09-16 PROCEDURE — 76816 OB US FOLLOW-UP PER FETUS: CPT | Mod: 26,S$PBB,, | Performed by: OBSTETRICS & GYNECOLOGY

## 2020-09-16 PROCEDURE — 76816 PR  US,PREGNANT UTERUS,F/U,TRANSABD APP: ICD-10-PCS | Mod: 26,S$PBB,, | Performed by: OBSTETRICS & GYNECOLOGY

## 2020-09-16 PROCEDURE — 99999 PR PBB SHADOW E&M-EST. PATIENT-LVL III: CPT | Mod: PBBFAC,,, | Performed by: OBSTETRICS & GYNECOLOGY

## 2020-09-16 PROCEDURE — 99999 PR PBB SHADOW E&M-EST. PATIENT-LVL III: ICD-10-PCS | Mod: PBBFAC,,, | Performed by: OBSTETRICS & GYNECOLOGY

## 2020-09-16 PROCEDURE — 99213 OFFICE O/P EST LOW 20 MIN: CPT | Mod: 25,S$PBB,, | Performed by: OBSTETRICS & GYNECOLOGY

## 2020-09-16 PROCEDURE — 99213 OFFICE O/P EST LOW 20 MIN: CPT | Mod: PBBFAC,25 | Performed by: OBSTETRICS & GYNECOLOGY

## 2020-09-16 PROCEDURE — 76816 OB US FOLLOW-UP PER FETUS: CPT | Mod: PBBFAC | Performed by: OBSTETRICS & GYNECOLOGY

## 2020-09-16 PROCEDURE — 99213 PR OFFICE/OUTPT VISIT, EST, LEVL III, 20-29 MIN: ICD-10-PCS | Mod: 25,S$PBB,, | Performed by: OBSTETRICS & GYNECOLOGY

## 2020-09-16 NOTE — NURSING
Reviewed pre e precautions with pt headache, change in vision, epigastric pain  Pt verbalized understanding

## 2020-09-20 ENCOUNTER — TELEPHONE (OUTPATIENT)
Dept: OBSTETRICS AND GYNECOLOGY | Facility: HOSPITAL | Age: 35
End: 2020-09-20

## 2020-09-21 PROBLEM — O16.3 ELEVATED BLOOD PRESSURE AFFECTING PREGNANCY IN THIRD TRIMESTER, ANTEPARTUM: Status: ACTIVE | Noted: 2020-09-21
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
## 2020-09-23 NOTE — PROGRESS NOTES
See ultrasound report for details of ultrasound evaluation, counseling,  and recommendations.

## 2020-09-26 PROBLEM — R11.0 NAUSEA: Status: ACTIVE | Noted: 2020-09-26

## 2020-09-30 ENCOUNTER — HOSPITAL ENCOUNTER (INPATIENT)
Facility: OTHER | Age: 35
LOS: 15 days | Discharge: HOME | End: 2020-10-15
Attending: OBSTETRICS & GYNECOLOGY | Admitting: OBSTETRICS & GYNECOLOGY
Payer: OTHER GOVERNMENT

## 2020-09-30 ENCOUNTER — ANESTHESIA (OUTPATIENT)
Dept: OBSTETRICS AND GYNECOLOGY | Facility: OTHER | Age: 35
End: 2020-09-30

## 2020-09-30 ENCOUNTER — ANESTHESIA EVENT (OUTPATIENT)
Dept: OBSTETRICS AND GYNECOLOGY | Facility: OTHER | Age: 35
End: 2020-09-30

## 2020-09-30 DIAGNOSIS — Z3A.29 29 WEEKS GESTATION OF PREGNANCY: ICD-10-CM

## 2020-09-30 DIAGNOSIS — O14.93 PRE-ECLAMPSIA IN THIRD TRIMESTER: Primary | ICD-10-CM

## 2020-09-30 DIAGNOSIS — O26.649 CHOLESTASIS DURING PREGNANCY, ANTEPARTUM: ICD-10-CM

## 2020-09-30 DIAGNOSIS — O26.643 CHOLESTASIS DURING PREGNANCY IN THIRD TRIMESTER: ICD-10-CM

## 2020-09-30 DIAGNOSIS — Z98.891 S/P CESAREAN SECTION: ICD-10-CM

## 2020-09-30 PROBLEM — R11.0 NAUSEA: Status: RESOLVED | Noted: 2020-09-26 | Resolved: 2020-09-30

## 2020-09-30 PROBLEM — O16.3 ELEVATED BLOOD PRESSURE AFFECTING PREGNANCY IN THIRD TRIMESTER, ANTEPARTUM: Status: RESOLVED | Noted: 2020-09-21 | Resolved: 2020-09-30

## 2020-09-30 PROBLEM — N89.8 VAGINAL DISCHARGE: Status: RESOLVED | Noted: 2020-09-14 | Resolved: 2020-09-30

## 2020-09-30 PROBLEM — O13.2 GESTATIONAL HYPERTENSION, SECOND TRIMESTER: Status: RESOLVED | Noted: 2020-08-17 | Resolved: 2020-09-30

## 2020-09-30 PROBLEM — O14.90 PRE-ECLAMPSIA, ANTEPARTUM: Status: ACTIVE | Noted: 2020-09-30

## 2020-09-30 PROBLEM — O36.8390 FETAL HEART RATE NON-REASSURING AFFECTING MANAGEMENT OF MOTHER: Status: ACTIVE | Noted: 2020-09-30

## 2020-09-30 LAB
ALBUMIN SERPL BCP-MCNC: 1.9 G/DL (ref 3.5–5.2)
ALP SERPL-CCNC: 187 U/L (ref 55–135)
ALT SERPL W/O P-5'-P-CCNC: 19 U/L (ref 10–44)
ANION GAP SERPL CALC-SCNC: 9 MMOL/L (ref 8–16)
AST SERPL-CCNC: 23 U/L (ref 10–40)
BASOPHILS # BLD AUTO: 0.04 K/UL (ref 0–0.2)
BASOPHILS NFR BLD: 0.4 % (ref 0–1.9)
BILIRUB SERPL-MCNC: 0.2 MG/DL (ref 0.1–1)
BUN SERPL-MCNC: 10 MG/DL (ref 6–20)
CALCIUM SERPL-MCNC: 8.9 MG/DL (ref 8.7–10.5)
CHLORIDE SERPL-SCNC: 108 MMOL/L (ref 95–110)
CO2 SERPL-SCNC: 20 MMOL/L (ref 23–29)
CREAT SERPL-MCNC: 0.7 MG/DL (ref 0.5–1.4)
DIFFERENTIAL METHOD: ABNORMAL
EOSINOPHIL # BLD AUTO: 0 K/UL (ref 0–0.5)
EOSINOPHIL NFR BLD: 0.4 % (ref 0–8)
ERYTHROCYTE [DISTWIDTH] IN BLOOD BY AUTOMATED COUNT: 13.2 % (ref 11.5–14.5)
EST. GFR  (AFRICAN AMERICAN): >60 ML/MIN/1.73 M^2
EST. GFR  (NON AFRICAN AMERICAN): >60 ML/MIN/1.73 M^2
GLUCOSE SERPL-MCNC: 101 MG/DL (ref 70–110)
HCT VFR BLD AUTO: 36.4 % (ref 37–48.5)
HGB BLD-MCNC: 12.4 G/DL (ref 12–16)
IMM GRANULOCYTES # BLD AUTO: 0.06 K/UL (ref 0–0.04)
IMM GRANULOCYTES NFR BLD AUTO: 0.6 % (ref 0–0.5)
LYMPHOCYTES # BLD AUTO: 2.2 K/UL (ref 1–4.8)
LYMPHOCYTES NFR BLD: 21.9 % (ref 18–48)
MCH RBC QN AUTO: 30.1 PG (ref 27–31)
MCHC RBC AUTO-ENTMCNC: 34.1 G/DL (ref 32–36)
MCV RBC AUTO: 88 FL (ref 82–98)
MONOCYTES # BLD AUTO: 0.9 K/UL (ref 0.3–1)
MONOCYTES NFR BLD: 8.5 % (ref 4–15)
NEUTROPHILS # BLD AUTO: 6.9 K/UL (ref 1.8–7.7)
NEUTROPHILS NFR BLD: 68.2 % (ref 38–73)
NRBC BLD-RTO: 0 /100 WBC
PLATELET # BLD AUTO: 256 K/UL (ref 150–350)
PMV BLD AUTO: 10.9 FL (ref 9.2–12.9)
POTASSIUM SERPL-SCNC: 3.9 MMOL/L (ref 3.5–5.1)
PROT SERPL-MCNC: 5.5 G/DL (ref 6–8.4)
RBC # BLD AUTO: 4.12 M/UL (ref 4–5.4)
SARS-COV-2 RDRP RESP QL NAA+PROBE: NEGATIVE
SODIUM SERPL-SCNC: 137 MMOL/L (ref 136–145)
WBC # BLD AUTO: 10.05 K/UL (ref 3.9–12.7)

## 2020-09-30 PROCEDURE — 86850 RBC ANTIBODY SCREEN: CPT

## 2020-09-30 PROCEDURE — 99284 PR EMERGENCY DEPT VISIT,LEVEL IV: ICD-10-PCS | Mod: 25,,, | Performed by: OBSTETRICS & GYNECOLOGY

## 2020-09-30 PROCEDURE — 99285 EMERGENCY DEPT VISIT HI MDM: CPT | Mod: 25

## 2020-09-30 PROCEDURE — U0002 COVID-19 LAB TEST NON-CDC: HCPCS

## 2020-09-30 PROCEDURE — 59025 FETAL NON-STRESS TEST: CPT | Mod: 26,,, | Performed by: OBSTETRICS & GYNECOLOGY

## 2020-09-30 PROCEDURE — 80053 COMPREHEN METABOLIC PANEL: CPT

## 2020-09-30 PROCEDURE — 82239 BILE ACIDS TOTAL: CPT

## 2020-09-30 PROCEDURE — 36415 COLL VENOUS BLD VENIPUNCTURE: CPT

## 2020-09-30 PROCEDURE — 59025 PR FETAL 2N-STRESS TEST: ICD-10-PCS | Mod: 26,,, | Performed by: OBSTETRICS & GYNECOLOGY

## 2020-09-30 PROCEDURE — 85025 COMPLETE CBC W/AUTO DIFF WBC: CPT

## 2020-09-30 PROCEDURE — 63600175 PHARM REV CODE 636 W HCPCS: Performed by: STUDENT IN AN ORGANIZED HEALTH CARE EDUCATION/TRAINING PROGRAM

## 2020-09-30 PROCEDURE — 96372 THER/PROPH/DIAG INJ SC/IM: CPT

## 2020-09-30 PROCEDURE — 11000001 HC ACUTE MED/SURG PRIVATE ROOM

## 2020-09-30 PROCEDURE — 99284 EMERGENCY DEPT VISIT MOD MDM: CPT | Mod: 25,,, | Performed by: OBSTETRICS & GYNECOLOGY

## 2020-09-30 PROCEDURE — 76818 FETAL BIOPHYS PROFILE W/NST: CPT

## 2020-09-30 RX ORDER — DIPHENHYDRAMINE HCL 25 MG
25 CAPSULE ORAL EVERY 4 HOURS PRN
Status: DISCONTINUED | OUTPATIENT
Start: 2020-09-30 | End: 2020-10-01 | Stop reason: SDUPTHER

## 2020-09-30 RX ORDER — DIPHENHYDRAMINE HYDROCHLORIDE 50 MG/ML
25 INJECTION INTRAMUSCULAR; INTRAVENOUS EVERY 4 HOURS PRN
Status: DISCONTINUED | OUTPATIENT
Start: 2020-09-30 | End: 2020-10-10

## 2020-09-30 RX ORDER — AMOXICILLIN 250 MG
1 CAPSULE ORAL NIGHTLY PRN
Status: DISCONTINUED | OUTPATIENT
Start: 2020-09-30 | End: 2020-10-10

## 2020-09-30 RX ORDER — LEVOTHYROXINE SODIUM 50 UG/1
1 CAPSULE ORAL DAILY
Status: DISCONTINUED | OUTPATIENT
Start: 2020-10-01 | End: 2020-10-01

## 2020-09-30 RX ORDER — SODIUM CHLORIDE 9 MG/ML
INJECTION, SOLUTION INTRAVENOUS CONTINUOUS
Status: DISCONTINUED | OUTPATIENT
Start: 2020-09-30 | End: 2020-10-01

## 2020-09-30 RX ORDER — ACETAMINOPHEN 325 MG/1
650 TABLET ORAL EVERY 6 HOURS PRN
Status: DISCONTINUED | OUTPATIENT
Start: 2020-09-30 | End: 2020-10-10

## 2020-09-30 RX ORDER — PRENATAL WITH FERROUS FUM AND FOLIC ACID 3080; 920; 120; 400; 22; 1.84; 3; 20; 10; 1; 12; 200; 27; 25; 2 [IU]/1; [IU]/1; MG/1; [IU]/1; MG/1; MG/1; MG/1; MG/1; MG/1; MG/1; UG/1; MG/1; MG/1; MG/1; MG/1
1 TABLET ORAL DAILY
Status: DISCONTINUED | OUTPATIENT
Start: 2020-10-01 | End: 2020-10-10

## 2020-09-30 RX ORDER — SIMETHICONE 80 MG
1 TABLET,CHEWABLE ORAL EVERY 6 HOURS PRN
Status: DISCONTINUED | OUTPATIENT
Start: 2020-09-30 | End: 2020-10-10

## 2020-09-30 RX ORDER — ONDANSETRON 8 MG/1
8 TABLET, ORALLY DISINTEGRATING ORAL EVERY 8 HOURS PRN
Status: DISCONTINUED | OUTPATIENT
Start: 2020-09-30 | End: 2020-10-10

## 2020-09-30 RX ORDER — BETAMETHASONE SODIUM PHOSPHATE AND BETAMETHASONE ACETATE 3; 3 MG/ML; MG/ML
12 INJECTION, SUSPENSION INTRA-ARTICULAR; INTRALESIONAL; INTRAMUSCULAR; SOFT TISSUE EVERY 24 HOURS
Status: COMPLETED | OUTPATIENT
Start: 2020-09-30 | End: 2020-10-01

## 2020-09-30 RX ADMIN — BETAMETHASONE SODIUM PHOSPHATE AND BETAMETHASONE ACETATE 12 MG: 3; 3 INJECTION, SUSPENSION INTRA-ARTICULAR; INTRALESIONAL; INTRAMUSCULAR; SOFT TISSUE at 10:09

## 2020-10-01 PROBLEM — Z67.91 RH NEGATIVE STATE IN ANTEPARTUM PERIOD: Status: ACTIVE | Noted: 2020-10-01

## 2020-10-01 PROBLEM — O14.93 PRE-ECLAMPSIA IN THIRD TRIMESTER: Status: ACTIVE | Noted: 2020-09-30

## 2020-10-01 PROBLEM — O26.899 RH NEGATIVE STATE IN ANTEPARTUM PERIOD: Status: ACTIVE | Noted: 2020-10-01

## 2020-10-01 LAB
ABO + RH BLD: NORMAL
ALBUMIN SERPL BCP-MCNC: 2 G/DL (ref 3.5–5.2)
ALP SERPL-CCNC: 196 U/L (ref 55–135)
ALT SERPL W/O P-5'-P-CCNC: 18 U/L (ref 10–44)
ANION GAP SERPL CALC-SCNC: 11 MMOL/L (ref 8–16)
AST SERPL-CCNC: 24 U/L (ref 10–40)
BASOPHILS # BLD AUTO: 0.02 K/UL (ref 0–0.2)
BASOPHILS NFR BLD: 0.2 % (ref 0–1.9)
BILIRUB SERPL-MCNC: 0.3 MG/DL (ref 0.1–1)
BLD GP AB SCN CELLS X3 SERPL QL: NORMAL
BUN SERPL-MCNC: 11 MG/DL (ref 6–20)
CALCIUM SERPL-MCNC: 8.5 MG/DL (ref 8.7–10.5)
CHLORIDE SERPL-SCNC: 105 MMOL/L (ref 95–110)
CO2 SERPL-SCNC: 17 MMOL/L (ref 23–29)
CREAT SERPL-MCNC: 0.6 MG/DL (ref 0.5–1.4)
DIFFERENTIAL METHOD: ABNORMAL
EOSINOPHIL # BLD AUTO: 0 K/UL (ref 0–0.5)
EOSINOPHIL NFR BLD: 0 % (ref 0–8)
ERYTHROCYTE [DISTWIDTH] IN BLOOD BY AUTOMATED COUNT: 13.3 % (ref 11.5–14.5)
EST. GFR  (AFRICAN AMERICAN): >60 ML/MIN/1.73 M^2
EST. GFR  (NON AFRICAN AMERICAN): >60 ML/MIN/1.73 M^2
GLUCOSE SERPL-MCNC: 108 MG/DL (ref 70–110)
HCT VFR BLD AUTO: 39.6 % (ref 37–48.5)
HGB BLD-MCNC: 13.3 G/DL (ref 12–16)
IMM GRANULOCYTES # BLD AUTO: 0.1 K/UL (ref 0–0.04)
IMM GRANULOCYTES NFR BLD AUTO: 0.9 % (ref 0–0.5)
INJECT RH IG VOL PATIENT: NORMAL ML
LYMPHOCYTES # BLD AUTO: 0.9 K/UL (ref 1–4.8)
LYMPHOCYTES NFR BLD: 8.9 % (ref 18–48)
MAGNESIUM SERPL-MCNC: 5.5 MG/DL (ref 1.6–2.6)
MCH RBC QN AUTO: 29.8 PG (ref 27–31)
MCHC RBC AUTO-ENTMCNC: 33.6 G/DL (ref 32–36)
MCV RBC AUTO: 89 FL (ref 82–98)
MONOCYTES # BLD AUTO: 0.1 K/UL (ref 0.3–1)
MONOCYTES NFR BLD: 1.2 % (ref 4–15)
NEUTROPHILS # BLD AUTO: 9.4 K/UL (ref 1.8–7.7)
NEUTROPHILS NFR BLD: 88.8 % (ref 38–73)
NRBC BLD-RTO: 0 /100 WBC
PLATELET # BLD AUTO: 265 K/UL (ref 150–350)
PMV BLD AUTO: 11.2 FL (ref 9.2–12.9)
POTASSIUM SERPL-SCNC: 4.2 MMOL/L (ref 3.5–5.1)
PROT SERPL-MCNC: 5.9 G/DL (ref 6–8.4)
RBC # BLD AUTO: 4.46 M/UL (ref 4–5.4)
SODIUM SERPL-SCNC: 133 MMOL/L (ref 136–145)
WBC # BLD AUTO: 10.61 K/UL (ref 3.9–12.7)

## 2020-10-01 PROCEDURE — 96376 TX/PRO/DX INJ SAME DRUG ADON: CPT

## 2020-10-01 PROCEDURE — 96366 THER/PROPH/DIAG IV INF ADDON: CPT

## 2020-10-01 PROCEDURE — 25000003 PHARM REV CODE 250: Performed by: STUDENT IN AN ORGANIZED HEALTH CARE EDUCATION/TRAINING PROGRAM

## 2020-10-01 PROCEDURE — 99222 1ST HOSP IP/OBS MODERATE 55: CPT | Mod: ,,, | Performed by: OBSTETRICS & GYNECOLOGY

## 2020-10-01 PROCEDURE — 80053 COMPREHEN METABOLIC PANEL: CPT

## 2020-10-01 PROCEDURE — 96375 TX/PRO/DX INJ NEW DRUG ADDON: CPT

## 2020-10-01 PROCEDURE — 11000001 HC ACUTE MED/SURG PRIVATE ROOM

## 2020-10-01 PROCEDURE — 83735 ASSAY OF MAGNESIUM: CPT

## 2020-10-01 PROCEDURE — 63600175 PHARM REV CODE 636 W HCPCS: Performed by: STUDENT IN AN ORGANIZED HEALTH CARE EDUCATION/TRAINING PROGRAM

## 2020-10-01 PROCEDURE — 36415 COLL VENOUS BLD VENIPUNCTURE: CPT

## 2020-10-01 PROCEDURE — 63600519 RHOGAM PHARM REV CODE 636 ALT 250 W HCPCS: Performed by: OBSTETRICS & GYNECOLOGY

## 2020-10-01 PROCEDURE — 85025 COMPLETE CBC W/AUTO DIFF WBC: CPT

## 2020-10-01 PROCEDURE — 96365 THER/PROPH/DIAG IV INF INIT: CPT

## 2020-10-01 PROCEDURE — 99222 PR INITIAL HOSPITAL CARE,LEVL II: ICD-10-PCS | Mod: ,,, | Performed by: OBSTETRICS & GYNECOLOGY

## 2020-10-01 RX ORDER — HYDROXYZINE HYDROCHLORIDE 10 MG/1
10 TABLET, FILM COATED ORAL 3 TIMES DAILY PRN
Status: DISCONTINUED | OUTPATIENT
Start: 2020-10-01 | End: 2020-10-10

## 2020-10-01 RX ORDER — MAGNESIUM SULFATE HEPTAHYDRATE 40 MG/ML
2 INJECTION, SOLUTION INTRAVENOUS CONTINUOUS
Status: DISCONTINUED | OUTPATIENT
Start: 2020-10-01 | End: 2020-10-02

## 2020-10-01 RX ORDER — LABETALOL HYDROCHLORIDE 5 MG/ML
20 INJECTION, SOLUTION INTRAVENOUS ONCE
Status: COMPLETED | OUTPATIENT
Start: 2020-10-01 | End: 2020-10-01

## 2020-10-01 RX ORDER — CALCIUM GLUCONATE 98 MG/ML
1 INJECTION, SOLUTION INTRAVENOUS
Status: DISCONTINUED | OUTPATIENT
Start: 2020-10-01 | End: 2020-10-08

## 2020-10-01 RX ORDER — LABETALOL HCL 20 MG/4 ML
20 SYRINGE (ML) INTRAVENOUS ONCE
Status: DISCONTINUED | OUTPATIENT
Start: 2020-10-01 | End: 2020-10-01 | Stop reason: CLARIF

## 2020-10-01 RX ORDER — LEVOTHYROXINE SODIUM 25 UG/1
50 TABLET ORAL
Status: DISCONTINUED | OUTPATIENT
Start: 2020-10-01 | End: 2020-10-15 | Stop reason: HOSPADM

## 2020-10-01 RX ORDER — NAPROXEN SODIUM 220 MG/1
81 TABLET, FILM COATED ORAL DAILY
Status: DISCONTINUED | OUTPATIENT
Start: 2020-10-01 | End: 2020-10-09

## 2020-10-01 RX ORDER — URSODIOL 250 MG/1
500 TABLET, FILM COATED ORAL 2 TIMES DAILY
Status: DISCONTINUED | OUTPATIENT
Start: 2020-10-01 | End: 2020-10-01

## 2020-10-01 RX ORDER — LABETALOL 200 MG/1
200 TABLET, FILM COATED ORAL EVERY 12 HOURS
Status: DISCONTINUED | OUTPATIENT
Start: 2020-10-01 | End: 2020-10-02

## 2020-10-01 RX ORDER — MAGNESIUM SULFATE HEPTAHYDRATE 40 MG/ML
6 INJECTION, SOLUTION INTRAVENOUS ONCE
Status: COMPLETED | OUTPATIENT
Start: 2020-10-01 | End: 2020-10-01

## 2020-10-01 RX ORDER — URSODIOL 250 MG/1
250 TABLET, FILM COATED ORAL 2 TIMES DAILY
Status: DISCONTINUED | OUTPATIENT
Start: 2020-10-01 | End: 2020-10-09

## 2020-10-01 RX ORDER — SODIUM CHLORIDE, SODIUM LACTATE, POTASSIUM CHLORIDE, CALCIUM CHLORIDE 600; 310; 30; 20 MG/100ML; MG/100ML; MG/100ML; MG/100ML
INJECTION, SOLUTION INTRAVENOUS CONTINUOUS
Status: DISCONTINUED | OUTPATIENT
Start: 2020-10-01 | End: 2020-10-02

## 2020-10-01 RX ORDER — SODIUM CHLORIDE, SODIUM LACTATE, POTASSIUM CHLORIDE, CALCIUM CHLORIDE 600; 310; 30; 20 MG/100ML; MG/100ML; MG/100ML; MG/100ML
INJECTION, SOLUTION INTRAVENOUS CONTINUOUS
Status: DISCONTINUED | OUTPATIENT
Start: 2020-10-01 | End: 2020-10-01

## 2020-10-01 RX ORDER — LABETALOL HYDROCHLORIDE 5 MG/ML
20 INJECTION, SOLUTION INTRAVENOUS ONCE
Status: DISCONTINUED | OUTPATIENT
Start: 2020-10-01 | End: 2020-10-01

## 2020-10-01 RX ADMIN — ACETAMINOPHEN 650 MG: 325 TABLET, FILM COATED ORAL at 07:10

## 2020-10-01 RX ADMIN — URSODIOL 250 MG: 250 TABLET ORAL at 09:10

## 2020-10-01 RX ADMIN — URSODIOL 250 MG: 250 TABLET ORAL at 02:10

## 2020-10-01 RX ADMIN — LABETALOL HYDROCHLORIDE 200 MG: 200 TABLET, FILM COATED ORAL at 08:10

## 2020-10-01 RX ADMIN — DIPHENHYDRAMINE HYDROCHLORIDE 25 MG: 50 INJECTION, SOLUTION INTRAMUSCULAR; INTRAVENOUS at 01:10

## 2020-10-01 RX ADMIN — ONDANSETRON 8 MG: 8 TABLET, ORALLY DISINTEGRATING ORAL at 09:10

## 2020-10-01 RX ADMIN — SODIUM CHLORIDE, SODIUM LACTATE, POTASSIUM CHLORIDE, AND CALCIUM CHLORIDE: .6; .31; .03; .02 INJECTION, SOLUTION INTRAVENOUS at 08:10

## 2020-10-01 RX ADMIN — ASPIRIN 81 MG 81 MG: 81 TABLET ORAL at 08:10

## 2020-10-01 RX ADMIN — MAGNESIUM SULFATE HEPTAHYDRATE 2 G/HR: 40 INJECTION, SOLUTION INTRAVENOUS at 11:10

## 2020-10-01 RX ADMIN — SODIUM CHLORIDE, SODIUM LACTATE, POTASSIUM CHLORIDE, AND CALCIUM CHLORIDE: .6; .31; .03; .02 INJECTION, SOLUTION INTRAVENOUS at 02:10

## 2020-10-01 RX ADMIN — MAGNESIUM SULFATE HEPTAHYDRATE 2 G/HR: 40 INJECTION, SOLUTION INTRAVENOUS at 08:10

## 2020-10-01 RX ADMIN — LABETALOL HYDROCHLORIDE 20 MG: 5 INJECTION, SOLUTION INTRAVENOUS at 01:10

## 2020-10-01 RX ADMIN — MAGNESIUM SULFATE HEPTAHYDRATE 6 G: 40 INJECTION, SOLUTION INTRAVENOUS at 01:10

## 2020-10-01 RX ADMIN — SODIUM CHLORIDE, SODIUM LACTATE, POTASSIUM CHLORIDE, AND CALCIUM CHLORIDE: .6; .31; .03; .02 INJECTION, SOLUTION INTRAVENOUS at 12:10

## 2020-10-01 RX ADMIN — LEVOTHYROXINE SODIUM 50 MCG: 25 TABLET ORAL at 07:10

## 2020-10-01 RX ADMIN — URSODIOL 250 MG: 250 TABLET ORAL at 08:10

## 2020-10-01 RX ADMIN — BETAMETHASONE SODIUM PHOSPHATE AND BETAMETHASONE ACETATE 12 MG: 3; 3 INJECTION, SUSPENSION INTRA-ARTICULAR; INTRALESIONAL; INTRAMUSCULAR; SOFT TISSUE at 10:10

## 2020-10-01 RX ADMIN — HUMAN RHO(D) IMMUNE GLOBULIN 300 MCG: 300 INJECTION, SOLUTION INTRAMUSCULAR at 02:10

## 2020-10-01 RX ADMIN — ACETAMINOPHEN 650 MG: 325 TABLET, FILM COATED ORAL at 09:10

## 2020-10-01 RX ADMIN — LABETALOL HYDROCHLORIDE 200 MG: 200 TABLET, FILM COATED ORAL at 09:10

## 2020-10-01 RX ADMIN — PRENATAL VIT W/ FE FUMARATE-FA TAB 27-0.8 MG 1 TABLET: 27-0.8 TAB at 08:10

## 2020-10-01 RX ADMIN — MAGNESIUM SULFATE HEPTAHYDRATE 2 G/HR: 40 INJECTION, SOLUTION INTRAVENOUS at 02:10

## 2020-10-01 NOTE — PROGRESS NOTES
BP 92/53. Dr. Hagan notified. 250 cc bolus initiated. Magnesium stopped. Orders for stat Magnesium. Will continue to monitor.

## 2020-10-01 NOTE — ASSESSMENT & PLAN NOTE
-CBC, CMP on admit wnl  -Plt 256 AST/ALT 23/19, Cr 0.7 (baseline 0.5)  -24 hour urine protein on 9/22 1148  -Repeat CBC, CMP this am stable  - severe range pressure overnight s/p labetalol IV 20mg  - Start labetalol 200mg BID  - Started on magnesium for seizure prophylaxis  - BMZ started 9/30-10/1

## 2020-10-01 NOTE — SUBJECTIVE & OBJECTIVE
Obstetric HPI:  Patient denies contractions, active fetal movement, absent vaginal bleeding , absent loss of fluid      Objective:     Vital Signs (Most Recent):  Temp: 98.2 °F (36.8 °C) (10/01/20 1202)  Pulse: 77 (10/01/20 1202)  Resp: 18 (10/01/20 1202)  BP: 124/75 (10/01/20 1202)  SpO2: 97 % (10/01/20 1202) Vital Signs (24h Range):  Temp:  [97 °F (36.1 °C)-98.2 °F (36.8 °C)] 98.2 °F (36.8 °C)  Pulse:  [] 77  Resp:  [16-20] 18  SpO2:  [94 %-100 %] 97 %  BP: ()/(52-94) 124/75     Weight: 91.6 kg (202 lb)  Body mass index is 38.17 kg/m².    FHT: 115, mod aditi, + accels, - decels Cat 1 (reassuring)  TOCO:none      Intake/Output Summary (Last 24 hours) at 10/1/2020 1214  Last data filed at 10/1/2020 0900  Gross per 24 hour   Intake 1665.83 ml   Output 950 ml   Net 715.83 ml         Physical Exam:   Constitutional: She is oriented to person, place, and time. She appears well-developed and well-nourished. No distress.    HENT:   Head: Atraumatic.    Eyes: EOM are normal.    Neck: Normal range of motion.    Cardiovascular: Normal rate.     Pulmonary/Chest: Effort normal. No respiratory distress.        Abdominal: Soft. She exhibits no mass. There is no abdominal tenderness. There is no rebound and no guarding.             Musculoskeletal: Normal range of motion. No tenderness or edema.       Neurological: She is alert and oriented to person, place, and time.    Skin: Skin is warm and dry. She is not diaphoretic.    Psychiatric: She has a normal mood and affect.     Recent Labs   Lab 09/26/20  0446 09/30/20  2143 10/01/20  0816 10/01/20  1017   WBC 10.50 10.05 10.61  --    HGB 12.8 12.4 13.3  --    HCT 36.4* 36.4* 39.6  --     256 265  --    BUN 9 10 11  --    CREATININE 0.50* 0.7 0.6  --    ALT 25 19 18  --    AST 35 23 24  --    MG  --   --   --  5.5*

## 2020-10-01 NOTE — ASSESSMENT & PLAN NOTE
-9/30 22:01 - 22:05 4 minute FHR deceleration while in the SANG pending labs  -Due to category 2 tracing and comorbidities, will admit to antepartum service  -Continuous fetal monitoring  -NPO

## 2020-10-01 NOTE — SUBJECTIVE & OBJECTIVE
Obstetric HPI:  Denies contractions, vaginal bleeding, LOF. Endorses fetal movement.    OB History    Para Term  AB Living   1 0 0 0 0 0   SAB TAB Ectopic Multiple Live Births   0 0 0 0 0      # Outcome Date GA Lbr Judson/2nd Weight Sex Delivery Anes PTL Lv   1 Current              Past Medical History:   Diagnosis Date    Finger infection     right finger- patient will go to urgent care and take care of it    Thyroid disease     hypothyroid     Past Surgical History:   Procedure Laterality Date    DIAGNOSTIC LAPAROSCOPY N/A 10/28/2019    Procedure: LAPAROSCOPY, DIAGNOSTIC;  Surgeon: Luis Armando Vega MD;  Location: [facility];  Service: OB/GYN;  Laterality: N/A;    LAPAROSCOPIC SURGICAL REMOVAL OF CYST OF OVARY Left 10/28/2019    Procedure: EXCISION, CYST, OVARY, LAPAROSCOPIC, LEFT;  Surgeon: Luis Armando Vega MD;  Location: [facility] OR;  Service: OB/GYN;  Laterality: Left;    WISDOM TOOTH EXTRACTION         PTA Medications   Medication Sig    butalbital-acetaminophen-caffeine -40 mg (FIORICET, ESGIC) -40 mg per tablet Take 1 tablet by mouth every 6 (six) hours as needed for Headaches.    levothyroxine (TIROSINT) 50 mcg Cap Take 1 tablet by mouth once daily.    prenatal vit-iron fum-folic ac (RIGHT STEP PRENATAL VITAMINS) 27 mg iron- 0.8 mg Tab Take 1 tablet by mouth once daily.       Review of patient's allergies indicates:   Allergen Reactions    Shrimp Nausea And Vomiting        Family History     None        Tobacco Use    Smoking status: Never Smoker   Substance and Sexual Activity    Alcohol use: Not Currently     Comment: social    Drug use: Never    Sexual activity: Yes     Partners: Male     Birth control/protection: None     Review of Systems   Constitutional: Negative for chills and fever.   Respiratory: Negative for cough.    Cardiovascular: Negative for chest pain, palpitations and leg swelling.   Gastrointestinal: Negative for abdominal pain, constipation,  diarrhea and nausea.   Genitourinary: Negative for pelvic pain and vaginal bleeding.   Musculoskeletal: Negative for arthralgias.   Integumentary:  Negative for breast mass.   Neurological: Negative for headaches.   Psychiatric/Behavioral: Negative for depression.   Breast: Negative for mass     Objective:     Vital Signs (Most Recent):  Temp: 97.5 °F (36.4 °C) (09/30/20 2355)  Pulse: 85 (10/01/20 0050)  Resp: 16 (09/30/20 2355)  BP: (!) 172/86(MD notified) (10/01/20 0040)  SpO2: 98 % (10/01/20 0050) Vital Signs (24h Range):  Temp:  [97.5 °F (36.4 °C)-98.2 °F (36.8 °C)] 97.5 °F (36.4 °C)  Pulse:  [] 85  Resp:  [16-20] 16  SpO2:  [98 %-100 %] 98 %  BP: (139-172)/(71-94) 172/86     Weight: 91.6 kg (202 lb)  Body mass index is 38.17 kg/m².    FHT: 130, moderate variability, + accels, - decels (reassuring)  TOCO:  occasional irritability    Physical Exam:   Constitutional: She is oriented to person, place, and time. She appears well-developed and well-nourished.    HENT:   Head: Normocephalic and atraumatic.    Eyes: Pupils are equal, round, and reactive to light. EOM are normal.    Neck: Normal range of motion.    Cardiovascular: Normal rate and regular rhythm.     Pulmonary/Chest: Effort normal and breath sounds normal. No respiratory distress. She has no wheezes. She has no rales.        Abdominal: Soft. She exhibits no distension. There is no abdominal tenderness. There is no rebound and no guarding.             Musculoskeletal: Normal range of motion. No tenderness.       Neurological: She is alert and oriented to person, place, and time.    Skin: Skin is warm and dry.        Cervix: deferred     Significant Labs:  Lab Results   Component Value Date    GROUPTRH A NEG 09/30/2020    HEPBSAG Negative 08/06/2020     Recent Labs   Lab 09/30/20  2143   WBC 10.05   RBC 4.12   HGB 12.4   HCT 36.4*      MCV 88   MCH 30.1   MCHC 34.1     CMP  Sodium   Date Value Ref Range Status   09/30/2020 137 136 - 145 mmol/L  Final     Potassium   Date Value Ref Range Status   09/30/2020 3.9 3.5 - 5.1 mmol/L Final     Chloride   Date Value Ref Range Status   09/30/2020 108 95 - 110 mmol/L Final     CO2   Date Value Ref Range Status   09/30/2020 20 (L) 23 - 29 mmol/L Final     Glucose   Date Value Ref Range Status   09/30/2020 101 70 - 110 mg/dL Final     BUN, Bld   Date Value Ref Range Status   09/30/2020 10 6 - 20 mg/dL Final     Creatinine   Date Value Ref Range Status   09/30/2020 0.7 0.5 - 1.4 mg/dL Final     Calcium   Date Value Ref Range Status   09/30/2020 8.9 8.7 - 10.5 mg/dL Final     Total Protein   Date Value Ref Range Status   09/30/2020 5.5 (L) 6.0 - 8.4 g/dL Final     Albumin   Date Value Ref Range Status   09/30/2020 1.9 (L) 3.5 - 5.2 g/dL Final     Total Bilirubin   Date Value Ref Range Status   09/30/2020 0.2 0.1 - 1.0 mg/dL Final     Comment:     For infants and newborns, interpretation of results should be based  on gestational age, weight and in agreement with clinical  observations.  Premature Infant recommended reference ranges:  Up to 24 hours.............<8.0 mg/dL  Up to 48 hours............<12.0 mg/dL  3-5 days..................<15.0 mg/dL  6-29 days.................<15.0 mg/dL       Alkaline Phosphatase   Date Value Ref Range Status   09/30/2020 187 (H) 55 - 135 U/L Final     AST   Date Value Ref Range Status   09/30/2020 23 10 - 40 U/L Final     ALT   Date Value Ref Range Status   09/30/2020 19 10 - 44 U/L Final     Anion Gap   Date Value Ref Range Status   09/30/2020 9 8 - 16 mmol/L Final     eGFR if    Date Value Ref Range Status   09/30/2020 >60 >60 mL/min/1.73 m^2 Final     eGFR if non    Date Value Ref Range Status   09/30/2020 >60 >60 mL/min/1.73 m^2 Final     Comment:     Calculation used to obtain the estimated glomerular filtration  rate (eGFR) is the CKD-EPI equation.            I have personallly reviewed all pertinent lab results from the last 24 hours.

## 2020-10-01 NOTE — PROGRESS NOTES
Afternoon Assessment:    Patient was feeling flushed and lightheaded early this morning with episode of hypotension. Now feeling much better. Denies complaints, no s/s of pre-e    Temp:  [97 °F (36.1 °C)-98.2 °F (36.8 °C)] 98.2 °F (36.8 °C)  Pulse:  [] 77  Resp:  [16-20] 18  SpO2:  [94 %-100 %] 97 %  BP: ()/(52-94) 124/75    Mag level this am 5.5    A/P:  Pre-e w/sf. On magnesium through steroid window.   Clear liquid diet  Continuous while on magnesium  AM cbc/cmp    Caridad Hagan M.D.   OB/GYN  PGY-2

## 2020-10-01 NOTE — PROGRESS NOTES
Nurse called to bring attention to blood pressures over the last hour. Patient has been having blood pressures q15m. Her blood pressures are severe, not severe, severe, not severe and severe. Due to this, patient now meets diagnostic criteria for pre-eclampsia with SF (BP).    Will start magnesium sulfate for neuro protection for fetus and seizure prophylaxis.     Will give labetalol 20 mg now for last severe range blood pressure. If patient responds well to this, will start labetalol 200 mg BID.     --------------------------------------------------------------------------------------------------  Addend:  Nurse called back stating that patient is declining medication as she is very itchy and restless in the SCDs. Went to patient room to discuss. We took off SCDs. I stated while I perform the BPP we can get two blood pressures. If they are elevatd while the has the SCDs off, we will need to start the magnesium.    A/P:  BPP 8/8  Two severe range blood pressures  Patient agreed to start the magnesium and received labetalol 20 mg IV  Benadryl 25 mg IV  Ursodiol 300 mg daily  Will continue to titrate blood pressure medication    Counseled patient extensively on possible abnormal placentation and cause of elevated blood pressure and pre-eclampsia. Discussed diagnosis of preeclampsia with SF. Pt slowly grasping, but struggling to understand as she states her blood pressure has previously been 130/140 and other doctors in the past have not been as worried. Discussed progression of the illness.     Sánchez Rios MD  OBGYN PGY-2
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
## 2020-10-01 NOTE — ED NOTES
Pt presents to OB ED with complaints of high blood pressure. Pt reports BP at home 168/104. Pt reports a headache earlier today but states that it went away. Pt denies epigastric pain and visual disturbances. Pt reports positive fetal movement. Denies contractions, LOF, and bleeding.  BP set to cycle Q15 min.  MD notified.

## 2020-10-01 NOTE — ANESTHESIA PREPROCEDURE EVALUATION
[facility]  Anesthesia Pre-Operative Evaluation         Patient Name: Shanae Vo  YOB: 1985  MRN: [id number]    10/01/2020      Shanae Vo is a 35 y.o. female  @ 30w0d who presents for PreE. This IUP is complicated by obesity, cholestasis with persistent itching (elevated bile acids,  on 44), pre-eclampsia withOUT severe features.      OB History    Para Term  AB Living   1 0           SAB TAB Ectopic Multiple Live Births                  # Outcome Date GA Lbr Judson/2nd Weight Sex Delivery Anes PTL Lv   1 Current                Review of patient's allergies indicates:   Allergen Reactions    Shrimp Nausea And Vomiting       Wt Readings from Last 1 Encounters:   20 2355 91.6 kg (202 lb)       BP Readings from Last 3 Encounters:   10/01/20 129/61   20 (!) 153/92   20 120/66       Patient Active Problem List   Diagnosis    Endometrioma of ovary    Itching    Hypothyroidism    Current maternal condition affecting pregnancy    Pre-eclampsia, antepartum    Fetal heart rate non-reassuring affecting management of mother    Cholestasis during pregnancy       Past Surgical History:   Procedure Laterality Date    DIAGNOSTIC LAPAROSCOPY N/A 10/28/2019    Procedure: LAPAROSCOPY, DIAGNOSTIC;  Surgeon: Luis Armando Vega MD;  Location: [facility] OR;  Service: OB/GYN;  Laterality: N/A;    LAPAROSCOPIC SURGICAL REMOVAL OF CYST OF OVARY Left 10/28/2019    Procedure: EXCISION, CYST, OVARY, LAPAROSCOPIC, LEFT;  Surgeon: Luis Armando Vega MD;  Location: [facility] OR;  Service: OB/GYN;  Laterality: Left;    WISDOM TOOTH EXTRACTION         Social History     Socioeconomic History    Marital status:      Spouse name: Not on file    Number of children: Not on file    Years of education: Not on file    Highest education level: Not on file   Occupational History    Not on file   Social Needs    Financial resource strain: Not on  file    Food insecurity     Worry: Not on file     Inability: Not on file    Transportation needs     Medical: Not on file     Non-medical: Not on file   Tobacco Use    Smoking status: Never Smoker   Substance and Sexual Activity    Alcohol use: Not Currently     Comment: social    Drug use: Never    Sexual activity: Yes     Partners: Male     Birth control/protection: None   Lifestyle    Physical activity     Days per week: Not on file     Minutes per session: Not on file    Stress: Not on file   Relationships    Social connections     Talks on phone: Not on file     Gets together: Not on file     Attends Spiritism service: Not on file     Active member of club or organization: Not on file     Attends meetings of clubs or organizations: Not on file     Relationship status: Not on file   Other Topics Concern    Not on file   Social History Narrative    Not on file         Chemistry        Component Value Date/Time     (L) 10/01/2020 0816    K 4.2 10/01/2020 0816     10/01/2020 0816    CO2 17 (L) 10/01/2020 0816    BUN 11 10/01/2020 0816    CREATININE 0.6 10/01/2020 0816     10/01/2020 0816        Component Value Date/Time    CALCIUM 8.5 (L) 10/01/2020 0816    ALKPHOS 196 (H) 10/01/2020 0816    AST 24 10/01/2020 0816    ALT 18 10/01/2020 0816    BILITOT 0.3 10/01/2020 0816    ESTGFRAFRICA >60 10/01/2020 0816    EGFRNONAA >60 10/01/2020 0816            Lab Results   Component Value Date    WBC 10.61 10/01/2020    HGB 13.3 10/01/2020    HCT 39.6 10/01/2020    MCV 89 10/01/2020     10/01/2020       No results for input(s): PT, INR, PROTIME, APTT in the last 72 hours.          Anesthesia Evaluation    I have reviewed the Patient Summary Reports.    I have reviewed the Nursing Notes.    I have reviewed the Medications.     Review of Systems  Anesthesia Hx:  No problems with previous Anesthesia Denies Hx of Anesthetic complications  History of prior surgery of interest to airway  management or planning: Denies Family Hx of Anesthesia complications.   Denies Personal Hx of Anesthesia complications.   Social:  No Alcohol Use, Non-Smoker    Hematology/Oncology:  Hematology Normal        Cardiovascular:  Cardiovascular Normal  ECG has been reviewed.    Pulmonary:  Pulmonary Normal    Renal/:  Renal/ Normal     Musculoskeletal:  Musculoskeletal Normal    Neurological:  Neurology Normal        Physical Exam  General:  Well nourished    Airway/Jaw/Neck:  Airway Findings: Mouth Opening: Normal Tongue: Normal  General Airway Assessment: Adult  Mallampati: I  TM Distance: Normal, at least 6 cm  Jaw/Neck Findings:  Neck ROM: Normal ROM     Eyes/Ears/Nose:  EYES/EARS/NOSE FINDINGS: Normal   Dental:  Dental Findings: In tact   Chest/Lungs:  Chest/Lungs Findings: Clear to auscultation     Heart/Vascular:  Heart Findings: Rate: Normal  Rhythm: Regular Rhythm  Sounds: Normal     Abdomen:  Abdomen Findings:  Normal     Musculoskeletal:  Musculoskeletal Findings:    Skin:  Skin Findings:     Mental Status:  Mental Status Findings:  Cooperative, Alert and Oriented         Anesthesia Plan  Type of Anesthesia, risks & benefits discussed:  Anesthesia Type:  epidural, general, CSE, spinal  Patient's Preference:   Intra-op Monitoring Plan: standard ASA monitors  Intra-op Monitoring Plan Comments:   Post Op Pain Control Plan: multimodal analgesia  Post Op Pain Control Plan Comments:   Induction:   rapid sequence  Beta Blocker:  Patient is not currently on a Beta-Blocker (No further documentation required).       Informed Consent: Patient understands risks and agrees with Anesthesia plan.  Questions answered. Anesthesia consent signed with patient.  ASA Score: 2     Day of Surgery Review of History & Physical:    H&P update referred to the provider.         Ready For Surgery From Anesthesia Perspective.

## 2020-10-01 NOTE — ASSESSMENT & PLAN NOTE
-Consents signed for delivery and blood transfusion  -Ultrasound vertex position  -Admission to antepartum service  -Type and Screen 9/30
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
## 2020-10-01 NOTE — PLAN OF CARE
10/01/20 1609   OB SCREEN   Source of Information health record   Received Prenatal Care Yes   Any indications/suspicions for None   Is this a teen pregnancy No   Indication for adoption/Safe Haven No   Indication for DME/post-acute needs No   HIV (+) No   Any congenital  disorders No   Fetal demise/ death No       This patient has been screened for Social Work discharge planning needs. Based on  documentation in medical record , no discharge planning needs are anticipated at this time. Should any discharge planning needs arise, please consult . For urgent needs/consults, contact the  listed below at the number provided.      [facility]  NICU   Ext. [phone]-phone  [email or web address]
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
## 2020-10-01 NOTE — ASSESSMENT & PLAN NOTE
-9/30 22:01 - 22:05 4 minute FHR deceleration while in the SANG pending labs  -Due to category 2 tracing and comorbidities, will admit to antepartum service  -Continuous fetal monitoring  - clear liquid diet  -Consents signed for delivery and blood transfusion  -Ultrasound vertex position  -Admission to antepartum service  -Type and Screen 9/30  -Betamethasone course 9/30-10/1

## 2020-10-01 NOTE — HOSPITAL COURSE
09/30/2020: Admit to antepartum service for serial blood pressure monitoring, continuous fetal heart tracing, betamethasone course. Consents for delivery and blood transfusion signed. Ultrasound vertex position. Spiked severe range blood pressure after admission and was given labetalol IV 20mg x1 with appropriate resolution. Remained mild range throughout the night, plan to start labetalol in AM  10/01/2020: Patient tearful about situation this morning, but denies any current complaints or s/s of pre-e other than increased swelling.  10/02/2020: Patient complaining of painful swelling this morning, and states that she thinks the magnesium is making her feel nauseated. Has a slight headache, but denies wanting to try medications for it. Denies other s/s of pre-e. Has had intermittent hypotension episodes throughout the night after the labetalol 200mg given. Will change PO medications to Procardia. No severe range pressures noted. Will turn magnesium off today and advance diet to regular.   10/03/2020: VSS throughout the night. Denies s/s of pre-e today. Had more episodes of anxiety throughout the night attributed to her leg swelling. Declined long term anxiolytic at this time, ativan PRN given. NST reactive and reassuring.   10/04/2020 VSS. Episode of anxiety/panic overnight. Otherwise has remained clinically stable  10/05/2020: One severe range pressure overnight, otherwise mild range. Denies any s/s of PreE today. Continues to experience severe anxiety with respect to her swelling. Trazadone has been added PRN but patient declined last night. NST reactive and reassuring. Will titrate Procardia 30XL to 60XL today. Labetalol 20IV given x1 for sustained severe range  10/06/2020: Severe range pressures sustained early this am. Labetalol 20 IV given. F/U growth ultrasound performed yesterday- read pending. Denies s/s of pre-e other than swelling that has not increased since yesterday. Procardia increased by 30mg to  start 90XL in am.  10/07/2020: 1 set of sustained severe range blood pressures overnight requiring IV labetalol 20mg. Increased to procardia 90XL today. Slight headache this morning, mostly relieved with tylenol. Otherwise denies any new complaints.   10/08/2020: overnight patient with intermittent severe range and mild range blood pressures. Had 1 set of sustained requiring labetalol 20mg. Also endorsing 6/10 HA not relieved with compazine yesterday and somewhat relieved with tylenol. Made NPO overnight due to blood pressure elevations. Repeat CBC/CMP collected. Continuous monitoring.
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
## 2020-10-02 LAB
ALBUMIN SERPL BCP-MCNC: 1.8 G/DL (ref 3.5–5.2)
ALP SERPL-CCNC: 154 U/L (ref 55–135)
ALT SERPL W/O P-5'-P-CCNC: 21 U/L (ref 10–44)
ANION GAP SERPL CALC-SCNC: 10 MMOL/L (ref 8–16)
AST SERPL-CCNC: 25 U/L (ref 10–40)
BASOPHILS # BLD AUTO: 0.01 K/UL (ref 0–0.2)
BASOPHILS NFR BLD: 0.1 % (ref 0–1.9)
BILE AC SERPL-SCNC: 26 MCMOL/L
BILIRUB SERPL-MCNC: 0.3 MG/DL (ref 0.1–1)
BUN SERPL-MCNC: 14 MG/DL (ref 6–20)
CALCIUM SERPL-MCNC: 6.9 MG/DL (ref 8.7–10.5)
CHLORIDE SERPL-SCNC: 97 MMOL/L (ref 95–110)
CO2 SERPL-SCNC: 16 MMOL/L (ref 23–29)
CREAT SERPL-MCNC: 0.6 MG/DL (ref 0.5–1.4)
DIFFERENTIAL METHOD: ABNORMAL
EOSINOPHIL # BLD AUTO: 0 K/UL (ref 0–0.5)
EOSINOPHIL NFR BLD: 0 % (ref 0–8)
ERYTHROCYTE [DISTWIDTH] IN BLOOD BY AUTOMATED COUNT: 13.4 % (ref 11.5–14.5)
EST. GFR  (AFRICAN AMERICAN): >60 ML/MIN/1.73 M^2
EST. GFR  (NON AFRICAN AMERICAN): >60 ML/MIN/1.73 M^2
GLUCOSE SERPL-MCNC: 120 MG/DL (ref 70–110)
HCT VFR BLD AUTO: 34.4 % (ref 37–48.5)
HGB BLD-MCNC: 11.4 G/DL (ref 12–16)
IMM GRANULOCYTES # BLD AUTO: 0.17 K/UL (ref 0–0.04)
IMM GRANULOCYTES NFR BLD AUTO: 1.5 % (ref 0–0.5)
LYMPHOCYTES # BLD AUTO: 1.2 K/UL (ref 1–4.8)
LYMPHOCYTES NFR BLD: 10.3 % (ref 18–48)
MCH RBC QN AUTO: 31 PG (ref 27–31)
MCHC RBC AUTO-ENTMCNC: 33.1 G/DL (ref 32–36)
MCV RBC AUTO: 94 FL (ref 82–98)
MONOCYTES # BLD AUTO: 0.3 K/UL (ref 0.3–1)
MONOCYTES NFR BLD: 2.6 % (ref 4–15)
NEUTROPHILS # BLD AUTO: 9.9 K/UL (ref 1.8–7.7)
NEUTROPHILS NFR BLD: 85.5 % (ref 38–73)
NRBC BLD-RTO: 0 /100 WBC
PLATELET # BLD AUTO: 260 K/UL (ref 150–350)
PMV BLD AUTO: 11.1 FL (ref 9.2–12.9)
POTASSIUM SERPL-SCNC: 4.3 MMOL/L (ref 3.5–5.1)
PROT SERPL-MCNC: 5 G/DL (ref 6–8.4)
RBC # BLD AUTO: 3.68 M/UL (ref 4–5.4)
SODIUM SERPL-SCNC: 123 MMOL/L (ref 136–145)
TSH SERPL DL<=0.005 MIU/L-ACNC: 1.34 UIU/ML (ref 0.4–4)
WBC # BLD AUTO: 11.51 K/UL (ref 3.9–12.7)

## 2020-10-02 PROCEDURE — 99232 PR SUBSEQUENT HOSPITAL CARE,LEVL II: ICD-10-PCS | Mod: 25,,, | Performed by: OBSTETRICS & GYNECOLOGY

## 2020-10-02 PROCEDURE — 84443 ASSAY THYROID STIM HORMONE: CPT

## 2020-10-02 PROCEDURE — 80053 COMPREHEN METABOLIC PANEL: CPT

## 2020-10-02 PROCEDURE — 63600175 PHARM REV CODE 636 W HCPCS: Performed by: STUDENT IN AN ORGANIZED HEALTH CARE EDUCATION/TRAINING PROGRAM

## 2020-10-02 PROCEDURE — 36415 COLL VENOUS BLD VENIPUNCTURE: CPT

## 2020-10-02 PROCEDURE — 25000003 PHARM REV CODE 250: Performed by: STUDENT IN AN ORGANIZED HEALTH CARE EDUCATION/TRAINING PROGRAM

## 2020-10-02 PROCEDURE — 59025 PR FETAL 2N-STRESS TEST: ICD-10-PCS | Mod: 26,,, | Performed by: OBSTETRICS & GYNECOLOGY

## 2020-10-02 PROCEDURE — 99232 SBSQ HOSP IP/OBS MODERATE 35: CPT | Mod: 25,,, | Performed by: OBSTETRICS & GYNECOLOGY

## 2020-10-02 PROCEDURE — 85025 COMPLETE CBC W/AUTO DIFF WBC: CPT

## 2020-10-02 PROCEDURE — 59025 FETAL NON-STRESS TEST: CPT

## 2020-10-02 PROCEDURE — 59025 FETAL NON-STRESS TEST: CPT | Mod: 26,,, | Performed by: OBSTETRICS & GYNECOLOGY

## 2020-10-02 PROCEDURE — 11000001 HC ACUTE MED/SURG PRIVATE ROOM

## 2020-10-02 RX ORDER — NIFEDIPINE 30 MG/1
30 TABLET, EXTENDED RELEASE ORAL DAILY
Status: DISCONTINUED | OUTPATIENT
Start: 2020-10-02 | End: 2020-10-06

## 2020-10-02 RX ORDER — LORAZEPAM 0.5 MG/1
0.5 TABLET ORAL EVERY 6 HOURS PRN
Status: DISCONTINUED | OUTPATIENT
Start: 2020-10-02 | End: 2020-10-03

## 2020-10-02 RX ADMIN — NIFEDIPINE 30 MG: 30 TABLET, FILM COATED, EXTENDED RELEASE ORAL at 10:10

## 2020-10-02 RX ADMIN — ACETAMINOPHEN 650 MG: 325 TABLET, FILM COATED ORAL at 09:10

## 2020-10-02 RX ADMIN — URSODIOL 250 MG: 250 TABLET ORAL at 10:10

## 2020-10-02 RX ADMIN — LEVOTHYROXINE SODIUM 50 MCG: 25 TABLET ORAL at 05:10

## 2020-10-02 RX ADMIN — DIPHENHYDRAMINE HYDROCHLORIDE 25 MG: 50 INJECTION, SOLUTION INTRAMUSCULAR; INTRAVENOUS at 07:10

## 2020-10-02 RX ADMIN — ASPIRIN 81 MG 81 MG: 81 TABLET ORAL at 09:10

## 2020-10-02 RX ADMIN — HYDROXYZINE HYDROCHLORIDE 10 MG: 10 TABLET, FILM COATED ORAL at 09:10

## 2020-10-02 NOTE — PLAN OF CARE
Patient denies vaginal bleeding or LOF, reports good FM. Cont. EFM/Wedron in use and noted spontaneous decels x4 during the night that resolved with maternal position changes. MD called and reviewed strip. Patient continues on magnesium sulfate 2 gm per hour maintenance dose and LR at 75ml/hr. Patient medicated with tylenol during shift at 2156 for c/o H/A. Lower and upper extremity edema, UOP adequate, tolerating clear liquids. VSS, Afebrile. Patient rested well during this shift.
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
## 2020-10-02 NOTE — ASSESSMENT & PLAN NOTE
- CBC, CMP on admit wnl  - Plt 256 AST/ALT 23/19, Cr 0.7 (baseline 0.5)  - 24 hour urine protein on 9/22 1148  - Repeat CBC, CMP this am stable  - s/p labetalol IV 20mg on 9/30  - DC labetalol 200 due to hypotensive episodes after medication  - Cont procardia XL 30mg daily  - s/p magnesium for seizure prophylaxis  - s/p BMZ 9/30-10/1  - BP: (111-136)/(59-72) 136/72

## 2020-10-02 NOTE — ASSESSMENT & PLAN NOTE
- CBC, CMP on admit wnl  - Plt 256 AST/ALT 23/19, Cr 0.7 (baseline 0.5)  - 24 hour urine protein on 9/22 1148  - Repeat CBC, CMP this am stable  - s/p labetalol IV 20mg on 9/30  - Cont labetalol 200mg BID  - s/p magnesium for seizure prophylaxis  - s/p BMZ 9/30-10/1  - BP: ()/(52-85) 110/58  - Intermittent mild range pressures overnight but also intermittent hypotension as well. Will monitor closely today

## 2020-10-02 NOTE — ASSESSMENT & PLAN NOTE
-9/30 22:01 - 22:05 4 minute FHR deceleration while in the SANG pending labs  -Due to category 2 tracing and comorbidities, will admit to antepartum service  - Regular diet/NST BID  -Consents signed for delivery and blood transfusion  -Ultrasound vertex position  -Admission to antepartum service  -Type and Screen 9/30  -Betamethasone course 9/30-10/1

## 2020-10-02 NOTE — SUBJECTIVE & OBJECTIVE
Obstetric HPI:  Patient denies contractions, active fetal movement, absent vaginal bleeding , absent loss of fluid      Objective:     Vital Signs (Most Recent):  Temp: 97.9 °F (36.6 °C) (10/02/20 0007)  Pulse: 69 (10/02/20 0407)  Resp: 18 (10/02/20 0407)  BP: (!) 110/58 (10/02/20 0407)  SpO2: 96 % (10/02/20 0346) Vital Signs (24h Range):  Temp:  [97 °F (36.1 °C)-98.2 °F (36.8 °C)] 97.9 °F (36.6 °C)  Pulse:  [60-96] 69  Resp:  [18-20] 18  SpO2:  [94 %-99 %] 96 %  BP: ()/(52-85) 110/58     Weight: 91.6 kg (202 lb)  Body mass index is 38.17 kg/m².    FHT: 110, mod aditi, + accels, + intermittent decels throughout the night, not sustained and no contractions. Recovered with moderate variability with repositioning of patient. Overall reassuring.   TOCO:none      Intake/Output Summary (Last 24 hours) at 10/2/2020 0658  Last data filed at 10/2/2020 0600  Gross per 24 hour   Intake 4580.41 ml   Output 3600 ml   Net 980.41 ml         Physical Exam:   Constitutional: She is oriented to person, place, and time. She appears well-developed and well-nourished. No distress.    HENT:   Head: Atraumatic.    Eyes: EOM are normal.    Neck: Normal range of motion.    Cardiovascular: Normal rate.     Pulmonary/Chest: Effort normal. No respiratory distress.        Abdominal: Soft. She exhibits no mass. There is no abdominal tenderness. There is no rebound and no guarding.             Musculoskeletal: Normal range of motion. Edema (2+ pitting edema of LE bilaterally to knees, and 1+ non-pitting edema of UE) present. No tenderness.       Neurological: She is alert and oriented to person, place, and time.    Skin: Skin is warm and dry. She is not diaphoretic.    Psychiatric: She has a normal mood and affect.     Recent Labs   Lab 09/26/20  0446 09/30/20  2143 10/01/20  0816 10/01/20  1017   WBC 10.50 10.05 10.61  --    HGB 12.8 12.4 13.3  --    HCT 36.4* 36.4* 39.6  --     256 265  --    BUN 9 10 11  --    CREATININE 0.50* 0.7  0.6  --    ALT 25 19 18  --    AST 35 23 24  --    MG  --   --   --  5.5*

## 2020-10-03 LAB
ALBUMIN SERPL BCP-MCNC: 1.8 G/DL (ref 3.5–5.2)
ALP SERPL-CCNC: 159 U/L (ref 55–135)
ALT SERPL W/O P-5'-P-CCNC: 28 U/L (ref 10–44)
ANION GAP SERPL CALC-SCNC: 10 MMOL/L (ref 8–16)
AST SERPL-CCNC: 36 U/L (ref 10–40)
BASOPHILS # BLD AUTO: 0.02 K/UL (ref 0–0.2)
BASOPHILS NFR BLD: 0.2 % (ref 0–1.9)
BILIRUB SERPL-MCNC: 0.2 MG/DL (ref 0.1–1)
BUN SERPL-MCNC: 30 MG/DL (ref 6–20)
CALCIUM SERPL-MCNC: 7 MG/DL (ref 8.7–10.5)
CHLORIDE SERPL-SCNC: 100 MMOL/L (ref 95–110)
CO2 SERPL-SCNC: 17 MMOL/L (ref 23–29)
CREAT SERPL-MCNC: 0.8 MG/DL (ref 0.5–1.4)
DIFFERENTIAL METHOD: ABNORMAL
EOSINOPHIL # BLD AUTO: 0 K/UL (ref 0–0.5)
EOSINOPHIL NFR BLD: 0 % (ref 0–8)
ERYTHROCYTE [DISTWIDTH] IN BLOOD BY AUTOMATED COUNT: 13.2 % (ref 11.5–14.5)
EST. GFR  (AFRICAN AMERICAN): >60 ML/MIN/1.73 M^2
EST. GFR  (NON AFRICAN AMERICAN): >60 ML/MIN/1.73 M^2
GLUCOSE SERPL-MCNC: 114 MG/DL (ref 70–110)
HCT VFR BLD AUTO: 31 % (ref 37–48.5)
HGB BLD-MCNC: 10.3 G/DL (ref 12–16)
IMM GRANULOCYTES # BLD AUTO: 0.15 K/UL (ref 0–0.04)
IMM GRANULOCYTES NFR BLD AUTO: 1.2 % (ref 0–0.5)
LYMPHOCYTES # BLD AUTO: 1.6 K/UL (ref 1–4.8)
LYMPHOCYTES NFR BLD: 12.7 % (ref 18–48)
MAGNESIUM SERPL-MCNC: 3.8 MG/DL (ref 1.6–2.6)
MCH RBC QN AUTO: 29.9 PG (ref 27–31)
MCHC RBC AUTO-ENTMCNC: 33.2 G/DL (ref 32–36)
MCV RBC AUTO: 90 FL (ref 82–98)
MONOCYTES # BLD AUTO: 1.2 K/UL (ref 0.3–1)
MONOCYTES NFR BLD: 9.8 % (ref 4–15)
NEUTROPHILS # BLD AUTO: 9.4 K/UL (ref 1.8–7.7)
NEUTROPHILS NFR BLD: 76.1 % (ref 38–73)
NRBC BLD-RTO: 1 /100 WBC
PHOSPHATE SERPL-MCNC: 3.8 MG/DL (ref 2.7–4.5)
PLATELET # BLD AUTO: 276 K/UL (ref 150–350)
PMV BLD AUTO: 11.5 FL (ref 9.2–12.9)
POTASSIUM SERPL-SCNC: 4.1 MMOL/L (ref 3.5–5.1)
PROT SERPL-MCNC: 5 G/DL (ref 6–8.4)
RBC # BLD AUTO: 3.44 M/UL (ref 4–5.4)
SODIUM SERPL-SCNC: 127 MMOL/L (ref 136–145)
WBC # BLD AUTO: 12.39 K/UL (ref 3.9–12.7)

## 2020-10-03 PROCEDURE — 25000003 PHARM REV CODE 250: Performed by: STUDENT IN AN ORGANIZED HEALTH CARE EDUCATION/TRAINING PROGRAM

## 2020-10-03 PROCEDURE — 83735 ASSAY OF MAGNESIUM: CPT

## 2020-10-03 PROCEDURE — 85025 COMPLETE CBC W/AUTO DIFF WBC: CPT

## 2020-10-03 PROCEDURE — 59025 FETAL NON-STRESS TEST: CPT

## 2020-10-03 PROCEDURE — 99232 PR SUBSEQUENT HOSPITAL CARE,LEVL II: ICD-10-PCS | Mod: 25,,, | Performed by: OBSTETRICS & GYNECOLOGY

## 2020-10-03 PROCEDURE — 99232 SBSQ HOSP IP/OBS MODERATE 35: CPT | Mod: 25,,, | Performed by: OBSTETRICS & GYNECOLOGY

## 2020-10-03 PROCEDURE — 36415 COLL VENOUS BLD VENIPUNCTURE: CPT

## 2020-10-03 PROCEDURE — 84100 ASSAY OF PHOSPHORUS: CPT

## 2020-10-03 PROCEDURE — 11000001 HC ACUTE MED/SURG PRIVATE ROOM

## 2020-10-03 PROCEDURE — 59025 FETAL NON-STRESS TEST: CPT | Mod: 26,,, | Performed by: OBSTETRICS & GYNECOLOGY

## 2020-10-03 PROCEDURE — 59025 PR FETAL 2N-STRESS TEST: ICD-10-PCS | Mod: 26,,, | Performed by: OBSTETRICS & GYNECOLOGY

## 2020-10-03 PROCEDURE — 80053 COMPREHEN METABOLIC PANEL: CPT

## 2020-10-03 RX ORDER — TRAZODONE HYDROCHLORIDE 50 MG/1
50 TABLET ORAL DAILY PRN
Status: DISCONTINUED | OUTPATIENT
Start: 2020-10-03 | End: 2020-10-15 | Stop reason: HOSPADM

## 2020-10-03 RX ADMIN — ASPIRIN 81 MG 81 MG: 81 TABLET ORAL at 08:10

## 2020-10-03 RX ADMIN — URSODIOL 250 MG: 250 TABLET ORAL at 08:10

## 2020-10-03 RX ADMIN — NIFEDIPINE 30 MG: 30 TABLET, FILM COATED, EXTENDED RELEASE ORAL at 08:10

## 2020-10-03 RX ADMIN — LEVOTHYROXINE SODIUM 50 MCG: 25 TABLET ORAL at 06:10

## 2020-10-03 RX ADMIN — URSODIOL 250 MG: 250 TABLET ORAL at 09:10

## 2020-10-03 RX ADMIN — PRENATAL VIT W/ FE FUMARATE-FA TAB 27-0.8 MG 1 TABLET: 27-0.8 TAB at 08:10

## 2020-10-03 NOTE — NURSING
Patient is a 36 y/o  at 30 weeks and 2 days who is on L&D unit for observation for a dx of pre-E with severe features. Patient denied LOF, no vaginal bleeding, no H/A, or blurred vision. Patient has generalized edema in upper and lower extremities bilaterally. Tolerating clear liquids well, urine output adequate. VS stable. Patient rested well during shift. Will continue to monitor.

## 2020-10-03 NOTE — PLAN OF CARE
Blood pressures stable; magnesium discontinued this am; NST x 2 completed; patient reports + fetal movement, denies contractions, rupture

## 2020-10-03 NOTE — SUBJECTIVE & OBJECTIVE
Obstetric HPI:  Patient denies contractions, active fetal movement, absent vaginal bleeding , absent loss of fluid      Objective:     Vital Signs (Most Recent):  Temp: 98.4 °F (36.9 °C) (10/03/20 0604)  Pulse: 74 (10/03/20 0604)  Resp: 16 (10/03/20 0604)  BP: 136/72 (10/03/20 0604)  SpO2: (!) 93 % (10/03/20 0604) Vital Signs (24h Range):  Temp:  [97.8 °F (36.6 °C)-98.6 °F (37 °C)] 98.4 °F (36.9 °C)  Pulse:  [] 74  Resp:  [16-21] 16  SpO2:  [80 %-99 %] 93 %  BP: (111-136)/(59-72) 136/72     Weight: 98.8 kg (217 lb 11.3 oz)  Body mass index is 41.13 kg/m².    FHT: 120, mod aditi, + accels, - decels (cat 1 reassuring)  TOCO: none      Intake/Output Summary (Last 24 hours) at 10/3/2020 0641  Last data filed at 10/3/2020 0500  Gross per 24 hour   Intake 1675 ml   Output 1850 ml   Net -175 ml         Physical Exam:   Constitutional: She is oriented to person, place, and time. She appears well-developed and well-nourished. No distress.    HENT:   Head: Atraumatic.    Eyes: EOM are normal.    Neck: Normal range of motion.    Cardiovascular: Normal rate.     Pulmonary/Chest: Effort normal. No respiratory distress.        Abdominal: Soft. She exhibits no mass. There is no abdominal tenderness. There is no rebound and no guarding.             Musculoskeletal: Normal range of motion. Edema (2+ pitting edema of LE bilaterally to knees, and 1+ non-pitting edema of UE) present. No tenderness.       Neurological: She is alert and oriented to person, place, and time.    Skin: Skin is warm and dry. She is not diaphoretic.    Psychiatric: She has a normal mood and affect.     Recent Labs   Lab 10/01/20  0816 10/01/20  1017 10/02/20  0646 10/03/20  0408   WBC 10.61  --  11.51 12.39   HGB 13.3  --  11.4* 10.3*   HCT 39.6  --  34.4* 31.0*     --  260 276   BUN 11  --  14 30*   CREATININE 0.6  --  0.6 0.8   ALT 18  --  21 28   AST 24  --  25 36   MG  --  5.5*  --   --
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
## 2020-10-03 NOTE — PROGRESS NOTES
RN called reporting patient feeling anxious. To bedside to discuss. Patient resting comfortably in bed. No work of breathing. Patient reports feeling tightening in her legs, which she thinks is likely due to the swelling. Overall, she is feeling very overwhelmed given that this is not what she expected to happen this pregnancy. Reassured the patient that she is in the right place to get the appropriate care she needs for both her and her baby. Both her and her  report that they are happy that they are here and getting appropriate care, but are just stressed with having to be in the hospital. IV ativan had been ordered yesterday for anxiety but pt reports she declined it because she ended up feeling better. Discussed starting maintenance medication for anxiety given that inpatient status could be prolonged. Pt not ready to start medication at this time. Offered PRN ativan for tonight and to readdress starting maintenance medication at a later time. Pt agreed.    - 0.5mg ativan PRN    Anabelle Barker MD   OBGYN, PGY-3

## 2020-10-03 NOTE — PLAN OF CARE
"No acute changes this shift. VSS. Pt afebrile. Pt denies HA, blurry vision, or RUQ pain. Pt has generalized edema (2+). Pt states "everything feels tight" and states she has trouble breathing because of the pressure on her diaphragm. Breath sounds clear and equal bilaterally. Pt diuresing well. SCDs in place. MD notified. Pt denies ctxs, LOF, or vaginal bleeding. States +FM. Scheduled medications given. Pt tolerating regular diet. Will continue plan of care. No signs of distress  "

## 2020-10-04 LAB
ALBUMIN SERPL BCP-MCNC: 2.2 G/DL (ref 3.5–5.2)
ALP SERPL-CCNC: 200 U/L (ref 55–135)
ALT SERPL W/O P-5'-P-CCNC: 34 U/L (ref 10–44)
ANION GAP SERPL CALC-SCNC: 11 MMOL/L (ref 8–16)
AST SERPL-CCNC: 45 U/L (ref 10–40)
BASOPHILS # BLD AUTO: 0.04 K/UL (ref 0–0.2)
BASOPHILS NFR BLD: 0.3 % (ref 0–1.9)
BILIRUB SERPL-MCNC: 0.3 MG/DL (ref 0.1–1)
BUN SERPL-MCNC: 28 MG/DL (ref 6–20)
CALCIUM SERPL-MCNC: 8.2 MG/DL (ref 8.7–10.5)
CHLORIDE SERPL-SCNC: 103 MMOL/L (ref 95–110)
CO2 SERPL-SCNC: 18 MMOL/L (ref 23–29)
CREAT SERPL-MCNC: 0.8 MG/DL (ref 0.5–1.4)
DIFFERENTIAL METHOD: ABNORMAL
EOSINOPHIL # BLD AUTO: 0 K/UL (ref 0–0.5)
EOSINOPHIL NFR BLD: 0.1 % (ref 0–8)
ERYTHROCYTE [DISTWIDTH] IN BLOOD BY AUTOMATED COUNT: 13.2 % (ref 11.5–14.5)
EST. GFR  (AFRICAN AMERICAN): >60 ML/MIN/1.73 M^2
EST. GFR  (NON AFRICAN AMERICAN): >60 ML/MIN/1.73 M^2
GLUCOSE SERPL-MCNC: 98 MG/DL (ref 70–110)
HCT VFR BLD AUTO: 37.5 % (ref 37–48.5)
HGB BLD-MCNC: 12.6 G/DL (ref 12–16)
IMM GRANULOCYTES # BLD AUTO: 0.26 K/UL (ref 0–0.04)
IMM GRANULOCYTES NFR BLD AUTO: 1.9 % (ref 0–0.5)
LYMPHOCYTES # BLD AUTO: 1.9 K/UL (ref 1–4.8)
LYMPHOCYTES NFR BLD: 13.5 % (ref 18–48)
MCH RBC QN AUTO: 29.8 PG (ref 27–31)
MCHC RBC AUTO-ENTMCNC: 33.6 G/DL (ref 32–36)
MCV RBC AUTO: 89 FL (ref 82–98)
MONOCYTES # BLD AUTO: 1.8 K/UL (ref 0.3–1)
MONOCYTES NFR BLD: 12.8 % (ref 4–15)
NEUTROPHILS # BLD AUTO: 10 K/UL (ref 1.8–7.7)
NEUTROPHILS NFR BLD: 71.4 % (ref 38–73)
NRBC BLD-RTO: 2 /100 WBC
PLATELET # BLD AUTO: 299 K/UL (ref 150–350)
PMV BLD AUTO: 10.9 FL (ref 9.2–12.9)
POTASSIUM SERPL-SCNC: 4.5 MMOL/L (ref 3.5–5.1)
PROT SERPL-MCNC: 6.2 G/DL (ref 6–8.4)
RBC # BLD AUTO: 4.23 M/UL (ref 4–5.4)
SODIUM SERPL-SCNC: 132 MMOL/L (ref 136–145)
WBC # BLD AUTO: 13.95 K/UL (ref 3.9–12.7)

## 2020-10-04 PROCEDURE — 36415 COLL VENOUS BLD VENIPUNCTURE: CPT

## 2020-10-04 PROCEDURE — 59025 FETAL NON-STRESS TEST: CPT

## 2020-10-04 PROCEDURE — 80053 COMPREHEN METABOLIC PANEL: CPT

## 2020-10-04 PROCEDURE — 63600175 PHARM REV CODE 636 W HCPCS

## 2020-10-04 PROCEDURE — 85025 COMPLETE CBC W/AUTO DIFF WBC: CPT

## 2020-10-04 PROCEDURE — 99232 SBSQ HOSP IP/OBS MODERATE 35: CPT | Mod: 25,,, | Performed by: OBSTETRICS & GYNECOLOGY

## 2020-10-04 PROCEDURE — 11000001 HC ACUTE MED/SURG PRIVATE ROOM

## 2020-10-04 PROCEDURE — 59025 PR FETAL 2N-STRESS TEST: ICD-10-PCS | Mod: 26,,, | Performed by: OBSTETRICS & GYNECOLOGY

## 2020-10-04 PROCEDURE — 59025 FETAL NON-STRESS TEST: CPT | Mod: 26,,, | Performed by: OBSTETRICS & GYNECOLOGY

## 2020-10-04 PROCEDURE — 25000003 PHARM REV CODE 250: Performed by: STUDENT IN AN ORGANIZED HEALTH CARE EDUCATION/TRAINING PROGRAM

## 2020-10-04 PROCEDURE — 90715 TDAP VACCINE 7 YRS/> IM: CPT | Performed by: STUDENT IN AN ORGANIZED HEALTH CARE EDUCATION/TRAINING PROGRAM

## 2020-10-04 PROCEDURE — 63600175 PHARM REV CODE 636 W HCPCS: Performed by: STUDENT IN AN ORGANIZED HEALTH CARE EDUCATION/TRAINING PROGRAM

## 2020-10-04 PROCEDURE — 90471 IMMUNIZATION ADMIN: CPT | Performed by: STUDENT IN AN ORGANIZED HEALTH CARE EDUCATION/TRAINING PROGRAM

## 2020-10-04 PROCEDURE — 99232 PR SUBSEQUENT HOSPITAL CARE,LEVL II: ICD-10-PCS | Mod: 25,,, | Performed by: OBSTETRICS & GYNECOLOGY

## 2020-10-04 RX ORDER — ACETAMINOPHEN 500 MG
1000 TABLET ORAL ONCE
Status: DISCONTINUED | OUTPATIENT
Start: 2020-10-04 | End: 2020-10-04

## 2020-10-04 RX ORDER — LORAZEPAM 0.5 MG/1
0.5 TABLET ORAL ONCE
Status: COMPLETED | OUTPATIENT
Start: 2020-10-04 | End: 2020-10-04

## 2020-10-04 RX ORDER — LORAZEPAM 2 MG/ML
INJECTION INTRAMUSCULAR
Status: COMPLETED
Start: 2020-10-04 | End: 2020-10-04

## 2020-10-04 RX ADMIN — NIFEDIPINE 30 MG: 30 TABLET, FILM COATED, EXTENDED RELEASE ORAL at 08:10

## 2020-10-04 RX ADMIN — LEVOTHYROXINE SODIUM 50 MCG: 25 TABLET ORAL at 06:10

## 2020-10-04 RX ADMIN — URSODIOL 250 MG: 250 TABLET ORAL at 08:10

## 2020-10-04 RX ADMIN — CLOSTRIDIUM TETANI TOXOID ANTIGEN (FORMALDEHYDE INACTIVATED), CORYNEBACTERIUM DIPHTHERIAE TOXOID ANTIGEN (FORMALDEHYDE INACTIVATED), BORDETELLA PERTUSSIS TOXOID ANTIGEN (GLUTARALDEHYDE INACTIVATED), BORDETELLA PERTUSSIS FILAMENTOUS HEMAGGLUTININ ANTIGEN (FORMALDEHYDE INACTIVATED), BORDETELLA PERTUSSIS PERTACTIN ANTIGEN, AND BORDETELLA PERTUSSIS FIMBRIAE 2/3 ANTIGEN 0.5 ML: 5; 2; 2.5; 5; 3; 5 INJECTION, SUSPENSION INTRAMUSCULAR at 08:10

## 2020-10-04 RX ADMIN — ASPIRIN 81 MG 81 MG: 81 TABLET ORAL at 08:10

## 2020-10-04 RX ADMIN — LORAZEPAM 0.5 MG: 2 INJECTION INTRAMUSCULAR; INTRAVENOUS at 03:10
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
## 2020-10-04 NOTE — NURSING
"Pt called out c/o of nausea and abdominal pain. At the bedside, pt stated she was having "pain all over my abdomen". Pt also described that her "abdomen is feeling hard"and that " it does not fully going away". Pt also reported pain and discomfort in her right and left lower legs and described the feeling as "tingling". On exam, SCDs removed and R & L pedal pulses were palpable-WNL. 2+ edema noted in the bilateral lower extremities. SCDs reapplied. Pt now tearful and visibly upset. Pt states "this is all over whelming just being in the hospital and then after moving down to this floor, I haven't been comfortable since getting here." MD notified, labs ordered. 0.5 mg of Ativan IV ordered and given. Pt placed on EFM and toco also. Pt BP retaken and WNL. Pt now resting and no longer tearful. Bed wheels locked, side rails up x2, call light within reach. Pt instructed to call RN before getting out of bed. Will continue to monitor.  "

## 2020-10-04 NOTE — ASSESSMENT & PLAN NOTE
- Due to category 2 tracing and comorbidities in SANG, will admit to antepartum service  - Regular diet/NST BID  - Consents signed for delivery and blood transfusion  - Ultrasound vertex position  - Type and Screen 9/30  - S/p Betamethasone course 9/30-10/1  - NST reactive and reassuring last evening

## 2020-10-04 NOTE — SUBJECTIVE & OBJECTIVE
Obstetric HPI:  Again, patient denies contractions, active fetal movement, absent vaginal bleeding , absent loss of fluid      Objective:     Vital Signs (Most Recent):  Temp: 97.7 °F (36.5 °C) (10/03/20 1946)  Pulse: 86 (10/04/20 0259)  Resp: 20 (10/03/20 1604)  BP: 139/71 (10/04/20 0257)  SpO2: 96 % (10/04/20 0257) Vital Signs (24h Range):  Temp:  [97.7 °F (36.5 °C)-98.4 °F (36.9 °C)] 97.7 °F (36.5 °C)  Pulse:  [70-91] 86  Resp:  [20] 20  SpO2:  [95 %-99 %] 96 %  BP: (124-150)/(67-79) 139/71     Weight: 98.8 kg (217 lb 11.3 oz)  Body mass index is 41.13 kg/m².    FHT: 125, mod aditi, + accels, - decels (cat 1 reassuring)  TOCO: none      Intake/Output Summary (Last 24 hours) at 10/4/2020 0647  Last data filed at 10/4/2020 0445  Gross per 24 hour   Intake 2490 ml   Output 2700 ml   Net -210 ml         Physical Exam:   Constitutional: She is oriented to person, place, and time. She appears well-developed and well-nourished. No distress.    HENT:   Head: Atraumatic.    Eyes: EOM are normal.    Neck: Normal range of motion.    Cardiovascular: Normal rate.     Pulmonary/Chest: Effort normal. No respiratory distress.        Abdominal: Soft. She exhibits no mass. There is no abdominal tenderness. There is no rebound and no guarding.             Musculoskeletal: Normal range of motion. Edema (2+ pitting edema of LE bilaterally to knees, and 1+ non-pitting edema of UE) present. No tenderness.       Neurological: She is alert and oriented to person, place, and time.    Skin: Skin is warm and dry. She is not diaphoretic.    Psychiatric: She has a normal mood and affect.     Recent Labs   Lab 10/01/20  1017 10/02/20  0646 10/03/20  0408 10/03/20  1149 10/04/20  0244 10/04/20  0245   WBC  --  11.51 12.39  --   --  13.95*   HGB  --  11.4* 10.3*  --   --  12.6   HCT  --  34.4* 31.0*  --   --  37.5   PLT  --  260 276  --   --  299   BUN  --  14 30*  --  28*  --    CREATININE  --  0.6 0.8  --  0.8  --    ALT  --  21 28  --  34  --     AST  --  25 36  --  45*  --    MG 5.5*  --   --  3.8*  --   --    PHOS  --   --   --  3.8  --   --

## 2020-10-04 NOTE — CARE UPDATE
Resident physician requested at bedside per nursing staff after patient complained of a number of physical complaints.  At bedside patient complains of diffuse abdominal pain that she associates with bloating.  She also describes a sharp pain that occurred prior to physician arrival which is now since resolved.  She also complains of tingling in her legs and states that she is overwhelmed since being moved downstairs to the antepartum floor.  Physician physical exam a bedside is reassuring.  Abdomen is soft and nontender to palpation. No significant tenderness to palpation in the right upper quadrant.  Patient appears visibly very anxious and is tearful on exam.  Patient does not appear to be in any acute clinical distress apart from anxiety at this time.  Will place patient on the monitor, order labs and treat anxiety.  Will continue to follow patient closely.    Rosi Esteban M.D.  PGY-4 OB/GYN  [facility]
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
## 2020-10-04 NOTE — ASSESSMENT & PLAN NOTE
- CBC, CMP on admit wnl  - Plt 256 AST/ALT 23/19, Cr 0.8 (baseline 0.5)  - 24 hour urine protein on 9/22 1148  - DC labetalol 200 due to hypotensive episodes after medication  - Cont procardia XL 30mg daily  - s/p magnesium for seizure prophylaxis  - s/p BMZ 9/30-10/1  - BP: (124-150)/(67-79) 139/71

## 2020-10-05 LAB
ALBUMIN SERPL BCP-MCNC: 1.8 G/DL (ref 3.5–5.2)
ALP SERPL-CCNC: 168 U/L (ref 55–135)
ALT SERPL W/O P-5'-P-CCNC: 25 U/L (ref 10–44)
ANION GAP SERPL CALC-SCNC: 10 MMOL/L (ref 8–16)
AST SERPL-CCNC: 30 U/L (ref 10–40)
BASOPHILS # BLD AUTO: 0.02 K/UL (ref 0–0.2)
BASOPHILS NFR BLD: 0.2 % (ref 0–1.9)
BILIRUB SERPL-MCNC: 0.2 MG/DL (ref 0.1–1)
BUN SERPL-MCNC: 18 MG/DL (ref 6–20)
CALCIUM SERPL-MCNC: 8 MG/DL (ref 8.7–10.5)
CHLORIDE SERPL-SCNC: 109 MMOL/L (ref 95–110)
CO2 SERPL-SCNC: 18 MMOL/L (ref 23–29)
CREAT SERPL-MCNC: 0.6 MG/DL (ref 0.5–1.4)
DIFFERENTIAL METHOD: ABNORMAL
EOSINOPHIL # BLD AUTO: 0 K/UL (ref 0–0.5)
EOSINOPHIL NFR BLD: 0.4 % (ref 0–8)
ERYTHROCYTE [DISTWIDTH] IN BLOOD BY AUTOMATED COUNT: 13.1 % (ref 11.5–14.5)
EST. GFR  (AFRICAN AMERICAN): >60 ML/MIN/1.73 M^2
EST. GFR  (NON AFRICAN AMERICAN): >60 ML/MIN/1.73 M^2
GLUCOSE SERPL-MCNC: 82 MG/DL (ref 70–110)
HCT VFR BLD AUTO: 35.8 % (ref 37–48.5)
HGB BLD-MCNC: 11.8 G/DL (ref 12–16)
HIV 1+2 AB+HIV1 P24 AG SERPL QL IA: NEGATIVE
IMM GRANULOCYTES # BLD AUTO: 0.16 K/UL (ref 0–0.04)
IMM GRANULOCYTES NFR BLD AUTO: 1.5 % (ref 0–0.5)
LYMPHOCYTES # BLD AUTO: 2 K/UL (ref 1–4.8)
LYMPHOCYTES NFR BLD: 19.3 % (ref 18–48)
MCH RBC QN AUTO: 29.8 PG (ref 27–31)
MCHC RBC AUTO-ENTMCNC: 33 G/DL (ref 32–36)
MCV RBC AUTO: 90 FL (ref 82–98)
MONOCYTES # BLD AUTO: 1.3 K/UL (ref 0.3–1)
MONOCYTES NFR BLD: 12.6 % (ref 4–15)
NEUTROPHILS # BLD AUTO: 7 K/UL (ref 1.8–7.7)
NEUTROPHILS NFR BLD: 66 % (ref 38–73)
NRBC BLD-RTO: 1 /100 WBC
PLATELET # BLD AUTO: 278 K/UL (ref 150–350)
PMV BLD AUTO: 11.4 FL (ref 9.2–12.9)
POTASSIUM SERPL-SCNC: 4.4 MMOL/L (ref 3.5–5.1)
PROT SERPL-MCNC: 5.2 G/DL (ref 6–8.4)
RBC # BLD AUTO: 3.96 M/UL (ref 4–5.4)
SODIUM SERPL-SCNC: 137 MMOL/L (ref 136–145)
WBC # BLD AUTO: 10.58 K/UL (ref 3.9–12.7)

## 2020-10-05 PROCEDURE — 76815 OB US LIMITED FETUS(S): CPT

## 2020-10-05 PROCEDURE — 86703 HIV-1/HIV-2 1 RESULT ANTBDY: CPT

## 2020-10-05 PROCEDURE — 11000001 HC ACUTE MED/SURG PRIVATE ROOM

## 2020-10-05 PROCEDURE — 25000003 PHARM REV CODE 250: Performed by: STUDENT IN AN ORGANIZED HEALTH CARE EDUCATION/TRAINING PROGRAM

## 2020-10-05 PROCEDURE — 96375 TX/PRO/DX INJ NEW DRUG ADDON: CPT

## 2020-10-05 PROCEDURE — 36415 COLL VENOUS BLD VENIPUNCTURE: CPT

## 2020-10-05 PROCEDURE — 80053 COMPREHEN METABOLIC PANEL: CPT

## 2020-10-05 PROCEDURE — 99231 SBSQ HOSP IP/OBS SF/LOW 25: CPT | Mod: ,,, | Performed by: OBSTETRICS & GYNECOLOGY

## 2020-10-05 PROCEDURE — 86592 SYPHILIS TEST NON-TREP QUAL: CPT

## 2020-10-05 PROCEDURE — 99231 PR SUBSEQUENT HOSPITAL CARE,LEVL I: ICD-10-PCS | Mod: ,,, | Performed by: OBSTETRICS & GYNECOLOGY

## 2020-10-05 PROCEDURE — 59025 FETAL NON-STRESS TEST: CPT

## 2020-10-05 PROCEDURE — 86762 RUBELLA ANTIBODY: CPT

## 2020-10-05 PROCEDURE — 85025 COMPLETE CBC W/AUTO DIFF WBC: CPT

## 2020-10-05 RX ORDER — LABETALOL HYDROCHLORIDE 5 MG/ML
20 INJECTION, SOLUTION INTRAVENOUS ONCE
Status: COMPLETED | OUTPATIENT
Start: 2020-10-05 | End: 2020-10-05

## 2020-10-05 RX ORDER — LABETALOL HYDROCHLORIDE 5 MG/ML
INJECTION, SOLUTION INTRAVENOUS
Status: DISPENSED
Start: 2020-10-05 | End: 2020-10-06

## 2020-10-05 RX ADMIN — LEVOTHYROXINE SODIUM 50 MCG: 25 TABLET ORAL at 06:10

## 2020-10-05 RX ADMIN — ASPIRIN 81 MG 81 MG: 81 TABLET ORAL at 08:10

## 2020-10-05 RX ADMIN — LABETALOL HYDROCHLORIDE 20 MG: 5 INJECTION, SOLUTION INTRAVENOUS at 01:10

## 2020-10-05 RX ADMIN — URSODIOL 250 MG: 250 TABLET ORAL at 09:10

## 2020-10-05 RX ADMIN — ACETAMINOPHEN 650 MG: 325 TABLET, FILM COATED ORAL at 09:10

## 2020-10-05 RX ADMIN — URSODIOL 250 MG: 250 TABLET ORAL at 08:10

## 2020-10-05 RX ADMIN — NIFEDIPINE 30 MG: 30 TABLET, FILM COATED, EXTENDED RELEASE ORAL at 08:10

## 2020-10-05 NOTE — ASSESSMENT & PLAN NOTE
- CBC, CMP on admit WNL > repeat this AM also WNL  - This AM: Plt 278 AST/ALT 30/25, Cr 0.8 > 0.6 (baseline 0.5)  - 24 hour urine protein on 9/22 1148  - BP: (124-184)/(75-97) 153/78  - DC labetalol 200 due to hypotensive episodes after medication  - Will increase procardia XL 30mg to 60mg XL  - S/p magnesium for seizure prophylaxis  - S/p BMZ 9/30-10/1  - Asx this AM
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
## 2020-10-05 NOTE — SUBJECTIVE & OBJECTIVE
Objective:     Vital Signs (Most Recent):  Temp: 97.5 °F (36.4 °C) (10/05/20 0411)  Pulse: 73 (10/05/20 0456)  Resp: 18 (10/05/20 0419)  BP: (!) 153/78 (10/05/20 0456)  SpO2: 97 % (10/05/20 0403) Vital Signs (24h Range):  Temp:  [96.6 °F (35.9 °C)-97.6 °F (36.4 °C)] 97.5 °F (36.4 °C)  Pulse:  [71-91] 73  Resp:  [18-20] 18  SpO2:  [97 %-99 %] 97 %  BP: (124-184)/(75-97) 153/78     Weight: 97.7 kg (215 lb 4.5 oz)  Body mass index is 40.68 kg/m².    FHT: Cat 1 (reassuring)  TOCO: Quiet      Intake/Output Summary (Last 24 hours) at 10/5/2020 0650  Last data filed at 10/5/2020 0636  Gross per 24 hour   Intake 1740 ml   Output 3060 ml   Net -1320 ml       Cervical Exam: Deferred     Significant Labs:  Recent Labs   Lab 10/03/20  0408 10/04/20  0245 10/05/20  0428   WBC 12.39 13.95* 10.58   HGB 10.3* 12.6 11.8*   HCT 31.0* 37.5 35.8*   MCV 90 89 90    299 278      Recent Labs   Lab 10/01/20  1017  10/03/20  0408 10/03/20  1149 10/04/20  0244 10/05/20  0428   NA  --    < > 127*  --  132* 137   K  --    < > 4.1  --  4.5 4.4   CL  --    < > 100  --  103 109   CO2  --    < > 17*  --  18* 18*   BUN  --    < > 30*  --  28* 18   CREATININE  --    < > 0.8  --  0.8 0.6   GLU  --    < > 114*  --  98 82   PROT  --    < > 5.0*  --  6.2 5.2*   BILITOT  --    < > 0.2  --  0.3 0.2   ALKPHOS  --    < > 159*  --  200* 168*   ALT  --    < > 28  --  34 25   AST  --    < > 36  --  45* 30   MG 5.5*  --   --  3.8*  --   --    PHOS  --   --   --  3.8  --   --     < > = values in this interval not displayed.          Physical Exam:   Constitutional: She is oriented to person, place, and time. She appears well-developed and well-nourished. No distress.    HENT:   Head: Atraumatic.    Eyes: EOM are normal.    Neck: Normal range of motion.    Cardiovascular: Normal rate.     Pulmonary/Chest: Effort normal. No respiratory distress.        Abdominal: Soft. She exhibits no mass. There is no abdominal tenderness. There is no rebound and no  guarding.             Musculoskeletal: Normal range of motion. Edema (2+ pitting edema of LE bilaterally to knees, and 1+ non-pitting edema of UE) present. No tenderness.       Neurological: She is alert and oriented to person, place, and time.    Skin: Skin is warm and dry. She is not diaphoretic.    Psychiatric: She has a normal mood and affect.
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
## 2020-10-06 LAB
ALBUMIN SERPL BCP-MCNC: 1.8 G/DL (ref 3.5–5.2)
ALP SERPL-CCNC: 172 U/L (ref 55–135)
ALT SERPL W/O P-5'-P-CCNC: 22 U/L (ref 10–44)
ANION GAP SERPL CALC-SCNC: 8 MMOL/L (ref 8–16)
AST SERPL-CCNC: 28 U/L (ref 10–40)
BASOPHILS # BLD AUTO: 0.04 K/UL (ref 0–0.2)
BASOPHILS NFR BLD: 0.4 % (ref 0–1.9)
BILIRUB SERPL-MCNC: 0.2 MG/DL (ref 0.1–1)
BUN SERPL-MCNC: 18 MG/DL (ref 6–20)
CALCIUM SERPL-MCNC: 8.4 MG/DL (ref 8.7–10.5)
CHLORIDE SERPL-SCNC: 108 MMOL/L (ref 95–110)
CO2 SERPL-SCNC: 19 MMOL/L (ref 23–29)
CREAT SERPL-MCNC: 0.6 MG/DL (ref 0.5–1.4)
DIFFERENTIAL METHOD: ABNORMAL
EOSINOPHIL # BLD AUTO: 0.1 K/UL (ref 0–0.5)
EOSINOPHIL NFR BLD: 0.9 % (ref 0–8)
ERYTHROCYTE [DISTWIDTH] IN BLOOD BY AUTOMATED COUNT: 13.2 % (ref 11.5–14.5)
EST. GFR  (AFRICAN AMERICAN): >60 ML/MIN/1.73 M^2
EST. GFR  (NON AFRICAN AMERICAN): >60 ML/MIN/1.73 M^2
GLUCOSE SERPL-MCNC: 91 MG/DL (ref 70–110)
HCT VFR BLD AUTO: 37.7 % (ref 37–48.5)
HGB BLD-MCNC: 12.7 G/DL (ref 12–16)
IMM GRANULOCYTES # BLD AUTO: 0.06 K/UL (ref 0–0.04)
IMM GRANULOCYTES NFR BLD AUTO: 0.6 % (ref 0–0.5)
LYMPHOCYTES # BLD AUTO: 2.3 K/UL (ref 1–4.8)
LYMPHOCYTES NFR BLD: 23 % (ref 18–48)
MCH RBC QN AUTO: 30.3 PG (ref 27–31)
MCHC RBC AUTO-ENTMCNC: 33.7 G/DL (ref 32–36)
MCV RBC AUTO: 90 FL (ref 82–98)
MONOCYTES # BLD AUTO: 0.9 K/UL (ref 0.3–1)
MONOCYTES NFR BLD: 9.2 % (ref 4–15)
NEUTROPHILS # BLD AUTO: 6.7 K/UL (ref 1.8–7.7)
NEUTROPHILS NFR BLD: 65.9 % (ref 38–73)
NRBC BLD-RTO: 1 /100 WBC
PLATELET # BLD AUTO: 275 K/UL (ref 150–350)
PMV BLD AUTO: 10.9 FL (ref 9.2–12.9)
POTASSIUM SERPL-SCNC: 4.3 MMOL/L (ref 3.5–5.1)
PROT SERPL-MCNC: 5.4 G/DL (ref 6–8.4)
RBC # BLD AUTO: 4.19 M/UL (ref 4–5.4)
RUBV IGG SER-ACNC: 19.2 IU/ML
RUBV IGG SER-IMP: REACTIVE
SODIUM SERPL-SCNC: 135 MMOL/L (ref 136–145)
WBC # BLD AUTO: 10.14 K/UL (ref 3.9–12.7)

## 2020-10-06 PROCEDURE — 11000001 HC ACUTE MED/SURG PRIVATE ROOM

## 2020-10-06 PROCEDURE — 36415 COLL VENOUS BLD VENIPUNCTURE: CPT

## 2020-10-06 PROCEDURE — 25000003 PHARM REV CODE 250: Performed by: STUDENT IN AN ORGANIZED HEALTH CARE EDUCATION/TRAINING PROGRAM

## 2020-10-06 PROCEDURE — 85025 COMPLETE CBC W/AUTO DIFF WBC: CPT

## 2020-10-06 PROCEDURE — 99231 PR SUBSEQUENT HOSPITAL CARE,LEVL I: ICD-10-PCS | Mod: ,,, | Performed by: OBSTETRICS & GYNECOLOGY

## 2020-10-06 PROCEDURE — 99231 SBSQ HOSP IP/OBS SF/LOW 25: CPT | Mod: ,,, | Performed by: OBSTETRICS & GYNECOLOGY

## 2020-10-06 PROCEDURE — 80053 COMPREHEN METABOLIC PANEL: CPT

## 2020-10-06 PROCEDURE — 59025 FETAL NON-STRESS TEST: CPT

## 2020-10-06 RX ORDER — NIFEDIPINE 30 MG/1
90 TABLET, EXTENDED RELEASE ORAL DAILY
Status: DISCONTINUED | OUTPATIENT
Start: 2020-10-07 | End: 2020-10-07

## 2020-10-06 RX ORDER — LABETALOL HYDROCHLORIDE 5 MG/ML
20 INJECTION, SOLUTION INTRAVENOUS ONCE
Status: COMPLETED | OUTPATIENT
Start: 2020-10-06 | End: 2020-10-06

## 2020-10-06 RX ORDER — NIFEDIPINE 30 MG/1
60 TABLET, EXTENDED RELEASE ORAL DAILY
Status: DISCONTINUED | OUTPATIENT
Start: 2020-10-06 | End: 2020-10-06

## 2020-10-06 RX ORDER — NIFEDIPINE 30 MG/1
30 TABLET, EXTENDED RELEASE ORAL ONCE
Status: COMPLETED | OUTPATIENT
Start: 2020-10-06 | End: 2020-10-06

## 2020-10-06 RX ADMIN — ACETAMINOPHEN 650 MG: 325 TABLET, FILM COATED ORAL at 09:10

## 2020-10-06 RX ADMIN — URSODIOL 250 MG: 250 TABLET ORAL at 09:10

## 2020-10-06 RX ADMIN — NIFEDIPINE 30 MG: 30 TABLET, FILM COATED, EXTENDED RELEASE ORAL at 05:10

## 2020-10-06 RX ADMIN — URSODIOL 250 MG: 250 TABLET ORAL at 08:10

## 2020-10-06 RX ADMIN — ASPIRIN 81 MG 81 MG: 81 TABLET ORAL at 09:10

## 2020-10-06 RX ADMIN — LEVOTHYROXINE SODIUM 50 MCG: 25 TABLET ORAL at 06:10

## 2020-10-06 RX ADMIN — LABETALOL HYDROCHLORIDE 20 MG: 5 INJECTION, SOLUTION INTRAVENOUS at 06:10

## 2020-10-06 RX ADMIN — NIFEDIPINE 60 MG: 30 TABLET, FILM COATED, EXTENDED RELEASE ORAL at 06:10

## 2020-10-06 RX ADMIN — ACETAMINOPHEN 650 MG: 325 TABLET, FILM COATED ORAL at 08:10
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
## 2020-10-06 NOTE — ASSESSMENT & PLAN NOTE
- Due to category 2 tracing and comorbidities in SANG, will admit to antepartum service  - Consents signed for delivery and blood transfusion  - Ultrasound vertex position  - Type and Screen 9/30  - S/p Betamethasone course 9/30-10/1  - NST reactive and reassuring last evening  - s/p Tdap 10/4 and Rhogam 10/1
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
## 2020-10-06 NOTE — ASSESSMENT & PLAN NOTE
- CBC, CMP on admit WNL > repeat this AM also WNL  - This AM: Plt 278 AST/ALT 30/25, Cr 0.8 > 0.6 (baseline 0.5)  - 24 hour urine protein on 9/22 1148  - BP: (133-195)/(69-94) 164/82  - Labetalol stopped due to hypotension episodes  - Procardia increased to 60XL on 10/6  - S/p magnesium for seizure prophylaxis  - S/p BMZ 9/30-10/1  - Having severe range blood pressures this am. Given 20mg labetalol IV. If pushing multiple times may need to proceed with delivery  - Repeat CBC and CMP collected today- pending

## 2020-10-06 NOTE — SUBJECTIVE & OBJECTIVE
Subjective  Denies HA, vision changes, SOB, CP, RUQ pain.   Endorses swelling of LE and UE bilaterally      Objective:     Vital Signs (Most Recent):  Temp: 97.1 °F (36.2 °C) (10/06/20 0607)  Pulse: 66 (10/06/20 0625)  Resp: 18 (10/06/20 0607)  BP: (!) 164/82 (10/06/20 0625)  SpO2: 98 % (10/06/20 0607) Vital Signs (24h Range):  Temp:  [97.1 °F (36.2 °C)-97.4 °F (36.3 °C)] 97.1 °F (36.2 °C)  Pulse:  [66-80] 66  Resp:  [18] 18  SpO2:  [95 %-99 %] 98 %  BP: (133-195)/(69-94) 164/82     Weight: 97.7 kg (215 lb 4.5 oz)  Body mass index is 40.68 kg/m².    FHT: Cat 1 (reassuring)  TOCO: Quiet    No intake or output data in the 24 hours ending 10/06/20 0650    Cervical Exam: Deferred     Significant Labs:  Recent Labs   Lab 10/03/20  0408 10/04/20  0245 10/05/20  0428   WBC 12.39 13.95* 10.58   HGB 10.3* 12.6 11.8*   HCT 31.0* 37.5 35.8*   MCV 90 89 90    299 278      Recent Labs   Lab 10/01/20  1017  10/03/20  0408 10/03/20  1149 10/04/20  0244 10/05/20  0428   NA  --    < > 127*  --  132* 137   K  --    < > 4.1  --  4.5 4.4   CL  --    < > 100  --  103 109   CO2  --    < > 17*  --  18* 18*   BUN  --    < > 30*  --  28* 18   CREATININE  --    < > 0.8  --  0.8 0.6   GLU  --    < > 114*  --  98 82   PROT  --    < > 5.0*  --  6.2 5.2*   BILITOT  --    < > 0.2  --  0.3 0.2   ALKPHOS  --    < > 159*  --  200* 168*   ALT  --    < > 28  --  34 25   AST  --    < > 36  --  45* 30   MG 5.5*  --   --  3.8*  --   --    PHOS  --   --   --  3.8  --   --     < > = values in this interval not displayed.      Wt Readings from Last 1 Encounters:   10/04/20 1215 97.7 kg (215 lb 4.5 oz)   10/03/20 0604 98.8 kg (217 lb 11.3 oz)   09/30/20 2355 91.6 kg (202 lb)         Physical Exam:   Constitutional: She is oriented to person, place, and time. She appears well-developed and well-nourished. No distress.    HENT:   Head: Atraumatic.    Eyes: EOM are normal.    Neck: Normal range of motion.    Cardiovascular: Normal rate.      Pulmonary/Chest: Effort normal. No respiratory distress.        Abdominal: Soft. She exhibits no mass. There is no abdominal tenderness. There is no rebound and no guarding.             Musculoskeletal: Normal range of motion. Edema (2+ pitting edema of LE bilaterally to knees, and 1+ non-pitting edema of UE) present. No tenderness.       Neurological: She is alert and oriented to person, place, and time.    Skin: Skin is warm and dry. She is not diaphoretic.    Psychiatric: She has a normal mood and affect.

## 2020-10-07 PROCEDURE — 99231 PR SUBSEQUENT HOSPITAL CARE,LEVL I: ICD-10-PCS | Mod: ,,, | Performed by: OBSTETRICS & GYNECOLOGY

## 2020-10-07 PROCEDURE — 25000003 PHARM REV CODE 250: Performed by: STUDENT IN AN ORGANIZED HEALTH CARE EDUCATION/TRAINING PROGRAM

## 2020-10-07 PROCEDURE — 63600175 PHARM REV CODE 636 W HCPCS: Performed by: STUDENT IN AN ORGANIZED HEALTH CARE EDUCATION/TRAINING PROGRAM

## 2020-10-07 PROCEDURE — 11000001 HC ACUTE MED/SURG PRIVATE ROOM

## 2020-10-07 PROCEDURE — 99231 SBSQ HOSP IP/OBS SF/LOW 25: CPT | Mod: ,,, | Performed by: OBSTETRICS & GYNECOLOGY

## 2020-10-07 RX ORDER — NIFEDIPINE 30 MG/1
30 TABLET, EXTENDED RELEASE ORAL ONCE
Status: COMPLETED | OUTPATIENT
Start: 2020-10-07 | End: 2020-10-07

## 2020-10-07 RX ORDER — PROCHLORPERAZINE MALEATE 5 MG
10 TABLET ORAL ONCE
Status: COMPLETED | OUTPATIENT
Start: 2020-10-07 | End: 2020-10-07

## 2020-10-07 RX ORDER — BUTALBITAL, ACETAMINOPHEN AND CAFFEINE 50; 325; 40 MG/1; MG/1; MG/1
1 TABLET ORAL EVERY 4 HOURS PRN
Status: DISCONTINUED | OUTPATIENT
Start: 2020-10-07 | End: 2020-10-10

## 2020-10-07 RX ORDER — LABETALOL HYDROCHLORIDE 5 MG/ML
20 INJECTION, SOLUTION INTRAVENOUS ONCE
Status: COMPLETED | OUTPATIENT
Start: 2020-10-07 | End: 2020-10-07

## 2020-10-07 RX ORDER — CALCIUM CARBONATE 200(500)MG
1000 TABLET,CHEWABLE ORAL 2 TIMES DAILY PRN
Status: DISCONTINUED | OUTPATIENT
Start: 2020-10-07 | End: 2020-10-08

## 2020-10-07 RX ORDER — NIFEDIPINE 30 MG/1
120 TABLET, EXTENDED RELEASE ORAL DAILY
Status: DISCONTINUED | OUTPATIENT
Start: 2020-10-08 | End: 2020-10-10

## 2020-10-07 RX ADMIN — CALCIUM CARBONATE (ANTACID) CHEW TAB 500 MG 1000 MG: 500 CHEW TAB at 12:10

## 2020-10-07 RX ADMIN — NIFEDIPINE 30 MG: 30 TABLET, FILM COATED, EXTENDED RELEASE ORAL at 05:10

## 2020-10-07 RX ADMIN — ACETAMINOPHEN 650 MG: 325 TABLET, FILM COATED ORAL at 12:10

## 2020-10-07 RX ADMIN — ASPIRIN 81 MG 81 MG: 81 TABLET ORAL at 08:10

## 2020-10-07 RX ADMIN — LORAZEPAM 1 MG: 2 INJECTION INTRAMUSCULAR; INTRAVENOUS at 09:10

## 2020-10-07 RX ADMIN — NIFEDIPINE 90 MG: 30 TABLET, FILM COATED, EXTENDED RELEASE ORAL at 08:10

## 2020-10-07 RX ADMIN — LEVOTHYROXINE SODIUM 50 MCG: 25 TABLET ORAL at 05:10

## 2020-10-07 RX ADMIN — LABETALOL HYDROCHLORIDE 20 MG: 5 INJECTION, SOLUTION INTRAVENOUS at 01:10

## 2020-10-07 RX ADMIN — URSODIOL 250 MG: 250 TABLET ORAL at 08:10

## 2020-10-07 RX ADMIN — ACETAMINOPHEN 650 MG: 325 TABLET, FILM COATED ORAL at 05:10

## 2020-10-07 RX ADMIN — TRAZODONE HYDROCHLORIDE 50 MG: 50 TABLET ORAL at 08:10

## 2020-10-07 RX ADMIN — PROCHLORPERAZINE MALEATE 10 MG: 5 TABLET ORAL at 10:10
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
## 2020-10-07 NOTE — ASSESSMENT & PLAN NOTE
- CBC, CMP on admit WNL > repeat this AM also WNL  - This AM: Plt 278 AST/ALT 30/25, Cr 0.8 > 0.6 (baseline 0.5)  - 24 hour urine protein on 9/22 1148  - BP: (127-175)/(73-92) 143/77  - Labetalol stopped due to hypotension episodes  - Procardia increased to 90XL on 10/7  - S/p magnesium for seizure prophylaxis  - S/p BMZ 9/30-10/1  - Had severe range blood pressures early this am, was given 20mg labetalol IV. If pushing multiple times may need to proceed with delivery  - Repeat CBC and CMP twice weekly as long as stable  - Tylenol PRN for headaches.

## 2020-10-07 NOTE — SUBJECTIVE & OBJECTIVE
Subjective  Endorses mild HA on and off this morning, 6/10 at its worst, tylenol mostly helps. Denies vision changes, SOB, CP, RUQ pain.   Endorses swelling of LE and UE bilaterally      Objective:     Vital Signs (Most Recent):  Temp: 98.4 °F (36.9 °C) (10/07/20 0550)  Pulse: 75 (10/07/20 0547)  Resp: 18 (10/07/20 0547)  BP: (!) 143/77 (10/07/20 0547)  SpO2: 99 % (10/07/20 0055) Vital Signs (24h Range):  Temp:  [97.2 °F (36.2 °C)-99 °F (37.2 °C)] 98.4 °F (36.9 °C)  Pulse:  [63-86] 75  Resp:  [18-20] 18  SpO2:  [96 %-100 %] 99 %  BP: (127-175)/(73-92) 143/77     Weight: 101.2 kg (223 lb)  Body mass index is 42.14 kg/m².    FHT: Cat 1, 140, mod aditi, + accels, - decels (reassuring)  TOCO: Quiet      Intake/Output Summary (Last 24 hours) at 10/7/2020 0630  Last data filed at 10/7/2020 0550  Gross per 24 hour   Intake 1560 ml   Output 2900 ml   Net -1340 ml       Cervical Exam: Deferred     Significant Labs:  Recent Labs   Lab 10/04/20  0245 10/05/20  0428 10/06/20  0656   WBC 13.95* 10.58 10.14   HGB 12.6 11.8* 12.7   HCT 37.5 35.8* 37.7   MCV 89 90 90    278 275      Recent Labs   Lab 10/01/20  1017  10/03/20  1149 10/04/20  0244 10/05/20  0428 10/06/20  0657   NA  --    < >  --  132* 137 135*   K  --    < >  --  4.5 4.4 4.3   CL  --    < >  --  103 109 108   CO2  --    < >  --  18* 18* 19*   BUN  --    < >  --  28* 18 18   CREATININE  --    < >  --  0.8 0.6 0.6   GLU  --    < >  --  98 82 91   PROT  --    < >  --  6.2 5.2* 5.4*   BILITOT  --    < >  --  0.3 0.2 0.2   ALKPHOS  --    < >  --  200* 168* 172*   ALT  --    < >  --  34 25 22   AST  --    < >  --  45* 30 28   MG 5.5*  --  3.8*  --   --   --    PHOS  --   --  3.8  --   --   --     < > = values in this interval not displayed.      Wt Readings from Last 1 Encounters:   10/07/20 0610 101.2 kg (223 lb)   10/06/20 0853 100 kg (220 lb 6.4 oz)   10/04/20 1215 97.7 kg (215 lb 4.5 oz)   10/03/20 0604 98.8 kg (217 lb 11.3 oz)   09/30/20 2355 91.6 kg (202 lb)          Physical Exam:   Constitutional: She is oriented to person, place, and time. She appears well-developed and well-nourished. No distress.    HENT:   Head: Atraumatic.    Eyes: EOM are normal.    Neck: Normal range of motion.    Cardiovascular: Normal rate.     Pulmonary/Chest: Effort normal. No respiratory distress.        Abdominal: Soft. She exhibits no mass. There is no abdominal tenderness. There is no rebound and no guarding.             Musculoskeletal: Normal range of motion. Edema (2+ pitting edema of LE bilaterally to knees, and 1+ non-pitting edema of UE ) present. No tenderness.       Neurological: She is alert and oriented to person, place, and time.    Skin: Skin is warm and dry. She is not diaphoretic.    Psychiatric: She has a normal mood and affect.

## 2020-10-07 NOTE — NURSING
rn called to dr. rios and reported bp results 175/83 and again 15 min later 161/81 dr. Rios to call Dr. Ortiz and will put in new order.

## 2020-10-07 NOTE — PLAN OF CARE
Patient sleeping quietly and awakens easily with rn at patient bs. Patient reports feeling headache. VSS afebrile. Denies vag bleeding or LOF, and contractions. Reports good FM. Rc'd x1 dose of labetalol 20 mg ivp during shift that resolved severe range bp.Was having heartburn at that time and reported had ate a hamburger for dinner. Tums dose resolved pateint's heartburn. RN discussed with patient to maintain a low sodium diet. Voiding adequate amt without diff.

## 2020-10-07 NOTE — PROGRESS NOTES
Pt noted to have Mild range BP's at this time. PO procardia given during shift. Pt currently on fetal monitor for NST. Pt denies vaginal bleeding, leakage of fluid from vagina, reports noting fetal movement, denies abdominal cramping. Pt currently has  at the bedside. Will continue to monitor

## 2020-10-08 LAB
ABO + RH BLD: NORMAL
ALBUMIN SERPL BCP-MCNC: 1.8 G/DL (ref 3.5–5.2)
ALP SERPL-CCNC: 162 U/L (ref 55–135)
ALT SERPL W/O P-5'-P-CCNC: 19 U/L (ref 10–44)
ANION GAP SERPL CALC-SCNC: 11 MMOL/L (ref 8–16)
AST SERPL-CCNC: 26 U/L (ref 10–40)
BASOPHILS # BLD AUTO: 0.04 K/UL (ref 0–0.2)
BASOPHILS # BLD AUTO: 0.05 K/UL (ref 0–0.2)
BASOPHILS NFR BLD: 0.4 % (ref 0–1.9)
BASOPHILS NFR BLD: 0.4 % (ref 0–1.9)
BILIRUB SERPL-MCNC: 0.2 MG/DL (ref 0.1–1)
BLD GP AB SCN CELLS X3 SERPL QL: NORMAL
BLOOD GROUP ANTIBODIES SERPL: NORMAL
BUN SERPL-MCNC: 19 MG/DL (ref 6–20)
CALCIUM SERPL-MCNC: 8.8 MG/DL (ref 8.7–10.5)
CHLORIDE SERPL-SCNC: 107 MMOL/L (ref 95–110)
CO2 SERPL-SCNC: 16 MMOL/L (ref 23–29)
CREAT SERPL-MCNC: 0.7 MG/DL (ref 0.5–1.4)
DIFFERENTIAL METHOD: ABNORMAL
DIFFERENTIAL METHOD: ABNORMAL
EOSINOPHIL # BLD AUTO: 0.1 K/UL (ref 0–0.5)
EOSINOPHIL # BLD AUTO: 0.1 K/UL (ref 0–0.5)
EOSINOPHIL NFR BLD: 0.7 % (ref 0–8)
EOSINOPHIL NFR BLD: 1 % (ref 0–8)
ERYTHROCYTE [DISTWIDTH] IN BLOOD BY AUTOMATED COUNT: 13.1 % (ref 11.5–14.5)
ERYTHROCYTE [DISTWIDTH] IN BLOOD BY AUTOMATED COUNT: 13.2 % (ref 11.5–14.5)
EST. GFR  (AFRICAN AMERICAN): >60 ML/MIN/1.73 M^2
EST. GFR  (NON AFRICAN AMERICAN): >60 ML/MIN/1.73 M^2
GLUCOSE SERPL-MCNC: 84 MG/DL (ref 70–110)
HCT VFR BLD AUTO: 40.2 % (ref 37–48.5)
HCT VFR BLD AUTO: 40.6 % (ref 37–48.5)
HGB BLD-MCNC: 13.5 G/DL (ref 12–16)
HGB BLD-MCNC: 13.6 G/DL (ref 12–16)
IMM GRANULOCYTES # BLD AUTO: 0.06 K/UL (ref 0–0.04)
IMM GRANULOCYTES # BLD AUTO: 0.06 K/UL (ref 0–0.04)
IMM GRANULOCYTES NFR BLD AUTO: 0.5 % (ref 0–0.5)
IMM GRANULOCYTES NFR BLD AUTO: 0.6 % (ref 0–0.5)
LYMPHOCYTES # BLD AUTO: 2.3 K/UL (ref 1–4.8)
LYMPHOCYTES # BLD AUTO: 2.3 K/UL (ref 1–4.8)
LYMPHOCYTES NFR BLD: 20.2 % (ref 18–48)
LYMPHOCYTES NFR BLD: 20.9 % (ref 18–48)
MCH RBC QN AUTO: 29.4 PG (ref 27–31)
MCH RBC QN AUTO: 29.6 PG (ref 27–31)
MCHC RBC AUTO-ENTMCNC: 33.5 G/DL (ref 32–36)
MCHC RBC AUTO-ENTMCNC: 33.6 G/DL (ref 32–36)
MCV RBC AUTO: 88 FL (ref 82–98)
MCV RBC AUTO: 88 FL (ref 82–98)
MONOCYTES # BLD AUTO: 0.9 K/UL (ref 0.3–1)
MONOCYTES # BLD AUTO: 0.9 K/UL (ref 0.3–1)
MONOCYTES NFR BLD: 8 % (ref 4–15)
MONOCYTES NFR BLD: 8.3 % (ref 4–15)
NEUTROPHILS # BLD AUTO: 7.5 K/UL (ref 1.8–7.7)
NEUTROPHILS # BLD AUTO: 7.9 K/UL (ref 1.8–7.7)
NEUTROPHILS NFR BLD: 68.8 % (ref 38–73)
NEUTROPHILS NFR BLD: 70.2 % (ref 38–73)
NRBC BLD-RTO: 0 /100 WBC
NRBC BLD-RTO: 0 /100 WBC
PLATELET # BLD AUTO: 309 K/UL (ref 150–350)
PLATELET # BLD AUTO: 328 K/UL (ref 150–350)
PMV BLD AUTO: 10.3 FL (ref 9.2–12.9)
PMV BLD AUTO: 10.3 FL (ref 9.2–12.9)
POTASSIUM SERPL-SCNC: 4.6 MMOL/L (ref 3.5–5.1)
PROT SERPL-MCNC: 5.6 G/DL (ref 6–8.4)
RBC # BLD AUTO: 4.56 M/UL (ref 4–5.4)
RBC # BLD AUTO: 4.62 M/UL (ref 4–5.4)
RPR SER QL: NORMAL
SODIUM SERPL-SCNC: 134 MMOL/L (ref 136–145)
WBC # BLD AUTO: 10.86 K/UL (ref 3.9–12.7)
WBC # BLD AUTO: 11.27 K/UL (ref 3.9–12.7)

## 2020-10-08 PROCEDURE — 25000003 PHARM REV CODE 250: Performed by: STUDENT IN AN ORGANIZED HEALTH CARE EDUCATION/TRAINING PROGRAM

## 2020-10-08 PROCEDURE — 11000001 HC ACUTE MED/SURG PRIVATE ROOM

## 2020-10-08 PROCEDURE — 80053 COMPREHEN METABOLIC PANEL: CPT

## 2020-10-08 PROCEDURE — 99232 SBSQ HOSP IP/OBS MODERATE 35: CPT | Mod: ,,, | Performed by: OBSTETRICS & GYNECOLOGY

## 2020-10-08 PROCEDURE — 99232 PR SUBSEQUENT HOSPITAL CARE,LEVL II: ICD-10-PCS | Mod: ,,, | Performed by: OBSTETRICS & GYNECOLOGY

## 2020-10-08 PROCEDURE — 85025 COMPLETE CBC W/AUTO DIFF WBC: CPT

## 2020-10-08 PROCEDURE — 59200 INSERT CERVICAL DILATOR: CPT

## 2020-10-08 PROCEDURE — 86901 BLOOD TYPING SEROLOGIC RH(D): CPT

## 2020-10-08 PROCEDURE — 63600175 PHARM REV CODE 636 W HCPCS: Performed by: STUDENT IN AN ORGANIZED HEALTH CARE EDUCATION/TRAINING PROGRAM

## 2020-10-08 PROCEDURE — 72100002 HC LABOR CARE, 1ST 8 HOURS

## 2020-10-08 PROCEDURE — 87081 CULTURE SCREEN ONLY: CPT

## 2020-10-08 PROCEDURE — 36415 COLL VENOUS BLD VENIPUNCTURE: CPT

## 2020-10-08 PROCEDURE — 86870 RBC ANTIBODY IDENTIFICATION: CPT

## 2020-10-08 RX ORDER — LABETALOL HYDROCHLORIDE 5 MG/ML
20 INJECTION, SOLUTION INTRAVENOUS ONCE
Status: COMPLETED | OUTPATIENT
Start: 2020-10-08 | End: 2020-10-08

## 2020-10-08 RX ORDER — OXYTOCIN/RINGER'S LACTATE 30/500 ML
334 PLASTIC BAG, INJECTION (ML) INTRAVENOUS ONCE
Status: DISCONTINUED | OUTPATIENT
Start: 2020-10-08 | End: 2020-10-10

## 2020-10-08 RX ORDER — MISOPROSTOL 100 MCG
50 TABLET ORAL ONCE
Status: COMPLETED | OUTPATIENT
Start: 2020-10-08 | End: 2020-10-08

## 2020-10-08 RX ORDER — SODIUM CHLORIDE, SODIUM LACTATE, POTASSIUM CHLORIDE, CALCIUM CHLORIDE 600; 310; 30; 20 MG/100ML; MG/100ML; MG/100ML; MG/100ML
INJECTION, SOLUTION INTRAVENOUS CONTINUOUS
Status: DISCONTINUED | OUTPATIENT
Start: 2020-10-08 | End: 2020-10-10

## 2020-10-08 RX ORDER — MAGNESIUM SULFATE HEPTAHYDRATE 40 MG/ML
6 INJECTION, SOLUTION INTRAVENOUS ONCE
Status: COMPLETED | OUTPATIENT
Start: 2020-10-08 | End: 2020-10-08

## 2020-10-08 RX ORDER — OXYTOCIN/RINGER'S LACTATE 30/500 ML
2 PLASTIC BAG, INJECTION (ML) INTRAVENOUS CONTINUOUS
Status: DISCONTINUED | OUTPATIENT
Start: 2020-10-08 | End: 2020-10-09

## 2020-10-08 RX ORDER — MAGNESIUM SULFATE HEPTAHYDRATE 40 MG/ML
2 INJECTION, SOLUTION INTRAVENOUS CONTINUOUS
Status: DISCONTINUED | OUTPATIENT
Start: 2020-10-08 | End: 2020-10-10

## 2020-10-08 RX ORDER — CALCIUM CARBONATE 200(500)MG
500 TABLET,CHEWABLE ORAL 3 TIMES DAILY PRN
Status: DISCONTINUED | OUTPATIENT
Start: 2020-10-08 | End: 2020-10-10

## 2020-10-08 RX ORDER — BUTALBITAL, ACETAMINOPHEN AND CAFFEINE 50; 325; 40 MG/1; MG/1; MG/1
2 TABLET ORAL ONCE
Status: DISCONTINUED | OUTPATIENT
Start: 2020-10-08 | End: 2020-10-08

## 2020-10-08 RX ORDER — SODIUM CHLORIDE 9 MG/ML
INJECTION, SOLUTION INTRAVENOUS
Status: DISCONTINUED | OUTPATIENT
Start: 2020-10-08 | End: 2020-10-10

## 2020-10-08 RX ORDER — CALCIUM GLUCONATE 98 MG/ML
1 INJECTION, SOLUTION INTRAVENOUS
Status: DISCONTINUED | OUTPATIENT
Start: 2020-10-08 | End: 2020-10-15 | Stop reason: HOSPADM

## 2020-10-08 RX ORDER — OXYTOCIN/RINGER'S LACTATE 30/500 ML
95 PLASTIC BAG, INJECTION (ML) INTRAVENOUS ONCE
Status: COMPLETED | OUTPATIENT
Start: 2020-10-08 | End: 2020-10-09

## 2020-10-08 RX ORDER — CEFAZOLIN SODIUM 2 G/50ML
2 SOLUTION INTRAVENOUS ONCE AS NEEDED
Status: DISCONTINUED | OUTPATIENT
Start: 2020-10-08 | End: 2020-10-10

## 2020-10-08 RX ADMIN — BUTALBITAL, ACETAMINOPHEN, AND CAFFEINE 1 TABLET: 50; 325; 40 TABLET ORAL at 04:10

## 2020-10-08 RX ADMIN — URSODIOL 250 MG: 250 TABLET ORAL at 08:10

## 2020-10-08 RX ADMIN — SODIUM CHLORIDE, SODIUM LACTATE, POTASSIUM CHLORIDE, AND CALCIUM CHLORIDE: .6; .31; .03; .02 INJECTION, SOLUTION INTRAVENOUS at 11:10

## 2020-10-08 RX ADMIN — DEXTROSE 3 MILLION UNITS: 50 INJECTION, SOLUTION INTRAVENOUS at 08:10

## 2020-10-08 RX ADMIN — MAGNESIUM SULFATE HEPTAHYDRATE 6 G: 40 INJECTION, SOLUTION INTRAVENOUS at 11:10

## 2020-10-08 RX ADMIN — Medication 2 MILLI-UNITS/MIN: at 04:10

## 2020-10-08 RX ADMIN — LEVOTHYROXINE SODIUM 50 MCG: 25 TABLET ORAL at 05:10

## 2020-10-08 RX ADMIN — URSODIOL 250 MG: 250 TABLET ORAL at 09:10

## 2020-10-08 RX ADMIN — Medication 50 MCG: at 11:10

## 2020-10-08 RX ADMIN — MAGNESIUM SULFATE IN WATER 2 G/HR: 40 INJECTION, SOLUTION INTRAVENOUS at 12:10

## 2020-10-08 RX ADMIN — DEXTROSE 3 MILLION UNITS: 50 INJECTION, SOLUTION INTRAVENOUS at 04:10

## 2020-10-08 RX ADMIN — ASPIRIN 81 MG 81 MG: 81 TABLET ORAL at 08:10

## 2020-10-08 RX ADMIN — LABETALOL HYDROCHLORIDE 20 MG: 5 INJECTION, SOLUTION INTRAVENOUS at 02:10

## 2020-10-08 RX ADMIN — NIFEDIPINE 120 MG: 30 TABLET, FILM COATED, EXTENDED RELEASE ORAL at 08:10

## 2020-10-08 RX ADMIN — ACETAMINOPHEN 650 MG: 325 TABLET, FILM COATED ORAL at 05:10

## 2020-10-08 RX ADMIN — DEXTROSE 5 MILLION UNITS: 50 INJECTION, SOLUTION INTRAVENOUS at 11:10

## 2020-10-08 NOTE — PROGRESS NOTES
"LABOR NOTE    S:  Complaints: No.  Epidural working:  not applicable  Resident to bedside for cervical check    O: /61   Pulse 88   Temp 98.1 °F (36.7 °C) (Temporal)   Resp 20   Ht 5' 1" (1.549 m)   Wt 101.5 kg (223 lb 11.2 oz)   LMP 2020   SpO2 96%   Breastfeeding No   BMI 42.27 kg/m²       FHT: 140, min-moderate variability, + accels, intermittent decels, but moderate variability. Overall reassuring, Cat 2  CTX: none  SVE: 0.5/40/-3 @1600 ferro firmly in place      ASSESSMENT:   35 y.o.  at 31w0d, with pre-e w/SF now being induced    FHT reassuring for gestational age on magnesium    Active Hospital Problems    Diagnosis  POA    *Pre-eclampsia in third trimester [O14.93]  Unknown    Rh negative state in antepartum period [O26.899, Z67.91]  Yes    Fetal heart rate non-reassuring affecting management of mother [O36.8390]  Yes    Cholestasis during pregnancy [O26.619, K83.1]  Yes    Hypothyroidism [E03.9]  Yes      Resolved Hospital Problems    Diagnosis Date Resolved POA    29 weeks gestation of pregnancy [Z3A.29] 2020 Not Applicable     Labor Course:  1200: 0.5/40/-3, ferro, will give PO cytotec  1600: unchanged, ferro in. Starting pitocin    PLAN:    Continue Close Maternal/Fetal Monitoring  Pitocin Augmentation per protocol  Recheck 4 hours or PRN    Caridad Hagan M.D.   OB/GYN  PGY-2      "

## 2020-10-08 NOTE — PHYSICIAN QUERY
PT Name: Shanae Vo  MR #: [id number]     HYPERTENSIVE CONDITION CLARIFICATION     CDS/: NIKOS Bolanos,RNC-MNN         Contact information:[email or web address]  This form is a permanent document in the medical record.     Query Date: October 8, 2020    By submitting this query, we are merely seeking further clarification of documentation to reflect the severity of illness of your patient. Please utilize your independent clinical judgment when addressing the question(s) below.    The medical record reflects the following:     Indicators   Supporting Clinical Findings Location in Medical Record   X Hypertension or hypertensive documented pre-e w/SF  OB Progress note 10/8@1151am   X Chronic condition(s) This IUP is complicated by obesity, cholestasis with persistent itching (elevated bile acids, 9/22 on 44), pre-eclampsia withOUT severe features H&P 10/1   X Vital signs DU=176/94, 177/90, 184/95, 171/92 Flowsheet 10/5-10/8   X Lab findings CBC, CMP on admit WNL > repeat this AM also WNL  - This AM: Plt 278 AST/ALT 30/25, Cr 0.8 > 0.6 (baseline 0.5)  - 24 hour urine protein on 9/22 1148 OB Progress note 10/8@647am    Radiology findings     X Treatment/Medication Labetalol stopped due to hypotension episodes  - Procardia increased to 120XL on 10/8  - S/p magnesium for seizure prophylaxis  - S/p BMZ 9/30-10/1  - Had severe range blood pressures overnight, only 1 episode of sustained requiring labetalol 20 but had others that were intermittent    Due to severe range blood pressures overnight with persistent HA, will move forward with IOL    labetaloL injection 20 mg  OB Progress note 10/8@647am                            OB Care Update 10/8@1139am        MAR 10/1-10/8    Other       Provider, please specify the diagnosis or diagnoses that correspond(s) to the above indicators:    Please specify any hypertensive conditions in addition to severe pre-eclampsia.    [   ] Hypertensive crisis,  unspecified   [   ] Hypertensive emergency    [x ] Hypertensive urgency   [   ] Other cardiovascular condition (please specify): __________   [   ]  Clinically Undetermined     Present on admission (POA) status:   [ x  ] Yes (Y)                          [   ] Clinically Undetermined (W)       [   ] No (N)                            [   ] Documentation insufficient to determine if condition is POA (U)       Please document in your progress notes daily for the duration of treatment until resolved, and include in your discharge summary.
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
## 2020-10-08 NOTE — PROGRESS NOTES
"LABOR NOTE    S:  Complaints: No.  Epidural working:  not applicable  Resident to bedside for cervical check    O: /76 (BP Location: Right arm, Patient Position: Lying) Comment: Dr. Ortiz notified. No new orders  Pulse 106   Temp 98.4 °F (36.9 °C) (Temporal)   Resp 20   Ht 5' 1" (1.549 m)   Wt 101.5 kg (223 lb 11.2 oz)   LMP 2020   SpO2 97%   Breastfeeding No   BMI 42.27 kg/m²       FHT: 140, min-moderate variability, + accels, - decels Cat 2 (overall-reassuring)  CTX: none  SVE: 0.5/40/-3 @1200, ferro placed      ASSESSMENT:   35 y.o.  at 31w0d, with pre-e w/SF now being induced    FHT reassuring for gestational age on magnesium    Active Hospital Problems    Diagnosis  POA    *Pre-eclampsia in third trimester [O14.93]  Unknown    Rh negative state in antepartum period [O26.899, Z67.91]  Yes    Fetal heart rate non-reassuring affecting management of mother [O36.8390]  Yes    Cholestasis during pregnancy [O26.619, K83.1]  Yes    Hypothyroidism [E03.9]  Yes      Resolved Hospital Problems    Diagnosis Date Resolved POA    29 weeks gestation of pregnancy [Z3A.29] 2020 Not Applicable     Labor Course:  1200: 0.5/40/-3, ferro, will give PO cytotec    PLAN:    Continue Close Maternal/Fetal Monitoring  Cytotec Augmentation per protocol  Recheck 2 hours or PRN    Caridad Hagan M.D.   OB/GYN  PGY-2      "

## 2020-10-08 NOTE — CARE UPDATE
Due to severe range blood pressures overnight with persistent HA, will move forward with IOL    Start magnesium for ppx. GBS collected. PCN for GBS unknown.

## 2020-10-08 NOTE — ASSESSMENT & PLAN NOTE
- CBC, CMP on admit WNL > repeat this AM also WNL  - This AM: Plt 278 AST/ALT 30/25, Cr 0.8 > 0.6 (baseline 0.5)  - 24 hour urine protein on 9/22 1148  - BP: (131-184)/(74-95) 131/76   - Labetalol stopped due to hypotension episodes  - Procardia increased to 120XL on 10/8  - S/p magnesium for seizure prophylaxis  - S/p BMZ 9/30-10/1  - Had severe range blood pressures overnight, only 1 episode of sustained requiring labetalol 20 but had others that were intermittent  - Repeat CBC and CMP this am  - NPO/continuous monitoring  - Has had 21 lb weight gain in the last week- 3+ pitting edema present on exam  - Tylenol PRN for headaches.   - Consider redosing steroids today for possible IOL due to BP lability and difficulty to control with medication increases as well as HA

## 2020-10-08 NOTE — SUBJECTIVE & OBJECTIVE
Subjective  Endorses HA again this morning, compazine yesterday did not help, tylenol helps some. Currently 6/10 in severity. Denies vision changes, SOB, CP, RUQ pain.   Endorses swelling of LE and UE bilaterally      Objective:     Vital Signs (Most Recent):  Temp: 98.4 °F (36.9 °C) (10/08/20 0226)  Pulse: 106 (10/08/20 0226)  Resp: 20 (10/08/20 0225)  BP: 131/76(Dr. Ortiz notified. No new orders) (10/08/20 0308)  SpO2: 97 % (10/08/20 0225) Vital Signs (24h Range):  Temp:  [97.2 °F (36.2 °C)-98.4 °F (36.9 °C)] 98.4 °F (36.9 °C)  Pulse:  [] 106  Resp:  [16-20] 20  SpO2:  [95 %-99 %] 97 %  BP: (131-184)/(74-95) 131/76     Weight: 101.5 kg (223 lb 11.2 oz)  Body mass index is 42.27 kg/m².    FHT: Cat 1, 135, mod aditi, + accels, - decels (reassuring)  TOCO: Quiet      Intake/Output Summary (Last 24 hours) at 10/8/2020 0644  Last data filed at 10/8/2020 0550  Gross per 24 hour   Intake 550 ml   Output 900 ml   Net -350 ml       Cervical Exam: Deferred     Significant Labs:  Recent Labs   Lab 10/04/20  0245 10/05/20  0428 10/06/20  0656   WBC 13.95* 10.58 10.14   HGB 12.6 11.8* 12.7   HCT 37.5 35.8* 37.7   MCV 89 90 90    278 275      Recent Labs   Lab 10/01/20  1017  10/03/20  1149 10/04/20  0244 10/05/20  0428 10/06/20  0657   NA  --    < >  --  132* 137 135*   K  --    < >  --  4.5 4.4 4.3   CL  --    < >  --  103 109 108   CO2  --    < >  --  18* 18* 19*   BUN  --    < >  --  28* 18 18   CREATININE  --    < >  --  0.8 0.6 0.6   GLU  --    < >  --  98 82 91   PROT  --    < >  --  6.2 5.2* 5.4*   BILITOT  --    < >  --  0.3 0.2 0.2   ALKPHOS  --    < >  --  200* 168* 172*   ALT  --    < >  --  34 25 22   AST  --    < >  --  45* 30 28   MG 5.5*  --  3.8*  --   --   --    PHOS  --   --  3.8  --   --   --     < > = values in this interval not displayed.      Wt Readings from Last 1 Encounters:   10/08/20 0550 101.5 kg (223 lb 11.2 oz)   10/07/20 0610 101.2 kg (223 lb)   10/06/20 0853 100 kg (220 lb 6.4 oz)    10/04/20 1215 97.7 kg (215 lb 4.5 oz)   10/03/20 0604 98.8 kg (217 lb 11.3 oz)   09/30/20 2355 91.6 kg (202 lb)         Physical Exam:   Constitutional: She is oriented to person, place, and time. She appears well-developed and well-nourished. No distress.    HENT:   Head: Atraumatic.    Eyes: EOM are normal.    Neck: Normal range of motion.    Cardiovascular: Normal rate.     Pulmonary/Chest: Effort normal. No respiratory distress.        Abdominal: Soft. She exhibits no mass. There is no abdominal tenderness. There is no rebound and no guarding.             Musculoskeletal: Normal range of motion. Edema (2+ pitting edema of LE bilaterally to knees, and 1+ non-pitting edema of UE ) present. No tenderness.       Neurological: She is alert and oriented to person, place, and time.    Skin: Skin is warm and dry. She is not diaphoretic.    Psychiatric: She has a normal mood and affect.

## 2020-10-08 NOTE — PLAN OF CARE
Vital signs currently stable. Pt had 4 severe range BP readings throughout night. 20mg IV labetalol given as ordered at 0252 which brought BP back down to WDL. Pt afebrile. Pt reports 5/10 headache. PRN acetaminophen given. To reassess pain in 1hr. Pt denies visual disturbances, chest pain, and epigastric pain. Pt denies contractions, vaginal bleeding, and LOF. Pt reports positive fetal movement. Pt NPO except meds. SCDs maintained. Call light in patient reach, safety maintained.

## 2020-10-09 ENCOUNTER — ANESTHESIA EVENT (OUTPATIENT)
Dept: OBSTETRICS AND GYNECOLOGY | Facility: OTHER | Age: 35
End: 2020-10-09
Payer: OTHER GOVERNMENT

## 2020-10-09 PROBLEM — Z98.891 S/P CESAREAN SECTION: Status: ACTIVE | Noted: 2020-10-09

## 2020-10-09 LAB
ALBUMIN FLD-MCNC: <0.3 G/DL
ALBUMIN SERPL BCP-MCNC: 2.3 G/DL (ref 3.5–5.2)
ALLENS TEST: ABNORMAL
ALLENS TEST: ABNORMAL
ALP SERPL-CCNC: 167 U/L (ref 55–135)
ALT SERPL W/O P-5'-P-CCNC: 18 U/L (ref 10–44)
ANION GAP SERPL CALC-SCNC: 9 MMOL/L (ref 8–16)
APPEARANCE FLD: NORMAL
APTT BLDCRRT: 23.6 SEC (ref 21–32)
AST SERPL-CCNC: 35 U/L (ref 10–40)
BASOPHILS # BLD AUTO: 0.03 K/UL (ref 0–0.2)
BASOPHILS # BLD AUTO: 0.05 K/UL (ref 0–0.2)
BASOPHILS NFR BLD: 0.2 % (ref 0–1.9)
BASOPHILS NFR BLD: 0.3 % (ref 0–1.9)
BILIRUB SERPL-MCNC: 0.4 MG/DL (ref 0.1–1)
BODY FLD TYPE: NORMAL
BUN SERPL-MCNC: 19 MG/DL (ref 6–20)
CALCIUM SERPL-MCNC: 7.5 MG/DL (ref 8.7–10.5)
CHLORIDE SERPL-SCNC: 101 MMOL/L (ref 95–110)
CO2 SERPL-SCNC: 18 MMOL/L (ref 23–29)
COLOR FLD: NORMAL
CREAT SERPL-MCNC: 0.8 MG/DL (ref 0.5–1.4)
DELSYS: ABNORMAL
DELSYS: ABNORMAL
DIFFERENTIAL METHOD: ABNORMAL
DIFFERENTIAL METHOD: ABNORMAL
EOSINOPHIL # BLD AUTO: 0 K/UL (ref 0–0.5)
EOSINOPHIL # BLD AUTO: 0 K/UL (ref 0–0.5)
EOSINOPHIL NFR BLD: 0.1 % (ref 0–8)
EOSINOPHIL NFR BLD: 0.1 % (ref 0–8)
ERYTHROCYTE [DISTWIDTH] IN BLOOD BY AUTOMATED COUNT: 12.9 % (ref 11.5–14.5)
ERYTHROCYTE [DISTWIDTH] IN BLOOD BY AUTOMATED COUNT: 13.1 % (ref 11.5–14.5)
ERYTHROCYTE [SEDIMENTATION RATE] IN BLOOD BY WESTERGREN METHOD: 0 MM/H
ERYTHROCYTE [SEDIMENTATION RATE] IN BLOOD BY WESTERGREN METHOD: 0 MM/H
EST. GFR  (AFRICAN AMERICAN): >60 ML/MIN/1.73 M^2
EST. GFR  (NON AFRICAN AMERICAN): >60 ML/MIN/1.73 M^2
FIBRINOGEN PPP-MCNC: 625 MG/DL (ref 182–366)
FIO2: 0
FIO2: 0
FLOW: 0
FLOW: 0
GLUCOSE SERPL-MCNC: 138 MG/DL (ref 70–110)
HCO3 UR-SCNC: 19.5 MMOL/L (ref 24–28)
HCO3 UR-SCNC: 20.2 MMOL/L (ref 24–28)
HCT VFR BLD AUTO: 33.9 % (ref 37–48.5)
HCT VFR BLD AUTO: 39.7 % (ref 37–48.5)
HGB BLD-MCNC: 11.2 G/DL (ref 12–16)
HGB BLD-MCNC: 13.3 G/DL (ref 12–16)
IMM GRANULOCYTES # BLD AUTO: 0.09 K/UL (ref 0–0.04)
IMM GRANULOCYTES # BLD AUTO: 0.16 K/UL (ref 0–0.04)
IMM GRANULOCYTES NFR BLD AUTO: 0.6 % (ref 0–0.5)
IMM GRANULOCYTES NFR BLD AUTO: 0.9 % (ref 0–0.5)
LYMPHOCYTES # BLD AUTO: 1 K/UL (ref 1–4.8)
LYMPHOCYTES # BLD AUTO: 1.6 K/UL (ref 1–4.8)
LYMPHOCYTES NFR BLD: 10.8 % (ref 18–48)
LYMPHOCYTES NFR BLD: 5.4 % (ref 18–48)
LYMPHOCYTES NFR FLD MANUAL: 31 %
MAGNESIUM SERPL-MCNC: 7.2 MG/DL (ref 1.6–2.6)
MCH RBC QN AUTO: 29 PG (ref 27–31)
MCH RBC QN AUTO: 29.4 PG (ref 27–31)
MCHC RBC AUTO-ENTMCNC: 33 G/DL (ref 32–36)
MCHC RBC AUTO-ENTMCNC: 33.5 G/DL (ref 32–36)
MCV RBC AUTO: 88 FL (ref 82–98)
MCV RBC AUTO: 88 FL (ref 82–98)
MESOTHL CELL NFR FLD MANUAL: 11 %
MODE: ABNORMAL
MODE: ABNORMAL
MONOCYTES # BLD AUTO: 0.4 K/UL (ref 0.3–1)
MONOCYTES # BLD AUTO: 1 K/UL (ref 0.3–1)
MONOCYTES NFR BLD: 2.4 % (ref 4–15)
MONOCYTES NFR BLD: 7 % (ref 4–15)
MONOS+MACROS NFR FLD MANUAL: 8 %
NEUTROPHILS # BLD AUTO: 12 K/UL (ref 1.8–7.7)
NEUTROPHILS # BLD AUTO: 16.4 K/UL (ref 1.8–7.7)
NEUTROPHILS NFR BLD: 81.2 % (ref 38–73)
NEUTROPHILS NFR BLD: 91 % (ref 38–73)
NEUTROPHILS NFR FLD MANUAL: 50 %
NRBC BLD-RTO: 0 /100 WBC
NRBC BLD-RTO: 0 /100 WBC
PCO2 BLDA: 58 MMHG (ref 35–45)
PCO2 BLDA: 64.8 MMHG (ref 35–45)
PH SMN: 7.1 [PH] (ref 7.35–7.45)
PH SMN: 7.14 [PH] (ref 7.35–7.45)
PLATELET # BLD AUTO: 296 K/UL (ref 150–350)
PLATELET # BLD AUTO: 316 K/UL (ref 150–350)
PMV BLD AUTO: 10.4 FL (ref 9.2–12.9)
PMV BLD AUTO: 10.4 FL (ref 9.2–12.9)
PO2 BLDA: 11 MMHG (ref 80–100)
PO2 BLDA: 8 MMHG (ref 80–100)
POC BE: -10 MMOL/L
POC BE: -9 MMOL/L
POC SATURATED O2: 4 % (ref 95–100)
POC SATURATED O2: 7 % (ref 95–100)
POTASSIUM SERPL-SCNC: 4.7 MMOL/L (ref 3.5–5.1)
PROT FLD-MCNC: <1 G/DL
PROT SERPL-MCNC: 5.8 G/DL (ref 6–8.4)
RBC # BLD AUTO: 3.86 M/UL (ref 4–5.4)
RBC # BLD AUTO: 4.53 M/UL (ref 4–5.4)
SAMPLE: ABNORMAL
SAMPLE: ABNORMAL
SITE: ABNORMAL
SITE: ABNORMAL
SODIUM SERPL-SCNC: 128 MMOL/L (ref 136–145)
SP02: 0
SP02: 0
SPECIMEN SOURCE: NORMAL
SPECIMEN SOURCE: NORMAL
WBC # BLD AUTO: 14.8 K/UL (ref 3.9–12.7)
WBC # BLD AUTO: 18.02 K/UL (ref 3.9–12.7)
WBC # FLD: 231 /CU MM

## 2020-10-09 PROCEDURE — 82042 OTHER SOURCE ALBUMIN QUAN EA: CPT

## 2020-10-09 PROCEDURE — 01968 PR INSERT CATH,ART,PERCUT,SHORTTERM: ICD-10-PCS | Mod: QY,,, | Performed by: ANESTHESIOLOGY

## 2020-10-09 PROCEDURE — 63600175 PHARM REV CODE 636 W HCPCS: Performed by: STUDENT IN AN ORGANIZED HEALTH CARE EDUCATION/TRAINING PROGRAM

## 2020-10-09 PROCEDURE — 36415 COLL VENOUS BLD VENIPUNCTURE: CPT

## 2020-10-09 PROCEDURE — 99900035 HC TECH TIME PER 15 MIN (STAT)

## 2020-10-09 PROCEDURE — 84157 ASSAY OF PROTEIN OTHER: CPT

## 2020-10-09 PROCEDURE — 51702 INSERT TEMP BLADDER CATH: CPT

## 2020-10-09 PROCEDURE — 89051 BODY FLUID CELL COUNT: CPT

## 2020-10-09 PROCEDURE — 83735 ASSAY OF MAGNESIUM: CPT

## 2020-10-09 PROCEDURE — 88307 PR  SURG PATH,LEVEL V: ICD-10-PCS | Mod: 26,,, | Performed by: PATHOLOGY

## 2020-10-09 PROCEDURE — 85384 FIBRINOGEN ACTIVITY: CPT

## 2020-10-09 PROCEDURE — 85730 THROMBOPLASTIN TIME PARTIAL: CPT

## 2020-10-09 PROCEDURE — 80053 COMPREHEN METABOLIC PANEL: CPT

## 2020-10-09 PROCEDURE — C1751 CATH, INF, PER/CENT/MIDLINE: HCPCS | Performed by: ANESTHESIOLOGY

## 2020-10-09 PROCEDURE — 88307 TISSUE EXAM BY PATHOLOGIST: CPT | Performed by: PATHOLOGY

## 2020-10-09 PROCEDURE — 59510 PR FULL ROUT OBSTE CARE,CESAREAN DELIV: ICD-10-PCS | Mod: ,,, | Performed by: OBSTETRICS & GYNECOLOGY

## 2020-10-09 PROCEDURE — 25000003 PHARM REV CODE 250: Performed by: STUDENT IN AN ORGANIZED HEALTH CARE EDUCATION/TRAINING PROGRAM

## 2020-10-09 PROCEDURE — 88307 TISSUE EXAM BY PATHOLOGIST: CPT | Mod: 26,,, | Performed by: PATHOLOGY

## 2020-10-09 PROCEDURE — 71000039 HC RECOVERY, EACH ADD'L HOUR: Performed by: OBSTETRICS & GYNECOLOGY

## 2020-10-09 PROCEDURE — 59514 CESAREAN DELIVERY ONLY: CPT | Mod: QY,,, | Performed by: ANESTHESIOLOGY

## 2020-10-09 PROCEDURE — 71000033 HC RECOVERY, INTIAL HOUR: Performed by: OBSTETRICS & GYNECOLOGY

## 2020-10-09 PROCEDURE — 27200710 HC EPIDURAL INFUSION PUMP SET: Performed by: ANESTHESIOLOGY

## 2020-10-09 PROCEDURE — 01968 ANES/ANALG CS DLVR NEURAXIAL: CPT | Mod: QY,,, | Performed by: ANESTHESIOLOGY

## 2020-10-09 PROCEDURE — 63600175 PHARM REV CODE 636 W HCPCS: Performed by: ANESTHESIOLOGY

## 2020-10-09 PROCEDURE — 82803 BLOOD GASES ANY COMBINATION: CPT

## 2020-10-09 PROCEDURE — 36004724 HC OB OR TIME LEV III - 1ST 15 MIN: Performed by: OBSTETRICS & GYNECOLOGY

## 2020-10-09 PROCEDURE — 72100003 HC LABOR CARE, EA. ADDL. 8 HRS

## 2020-10-09 PROCEDURE — 36004725 HC OB OR TIME LEV III - EA ADD 15 MIN: Performed by: OBSTETRICS & GYNECOLOGY

## 2020-10-09 PROCEDURE — 11000001 HC ACUTE MED/SURG PRIVATE ROOM

## 2020-10-09 PROCEDURE — 85025 COMPLETE CBC W/AUTO DIFF WBC: CPT | Mod: 91

## 2020-10-09 PROCEDURE — 59514 PRA REAN DELIVERY ONLY: ICD-10-PCS | Mod: QY,,, | Performed by: ANESTHESIOLOGY

## 2020-10-09 PROCEDURE — 62326 NJX INTERLAMINAR LMBR/SAC: CPT | Performed by: STUDENT IN AN ORGANIZED HEALTH CARE EDUCATION/TRAINING PROGRAM

## 2020-10-09 PROCEDURE — 37000009 HC ANESTHESIA EA ADD 15 MINS: Performed by: OBSTETRICS & GYNECOLOGY

## 2020-10-09 PROCEDURE — 59510 CESAREAN DELIVERY: CPT | Mod: ,,, | Performed by: OBSTETRICS & GYNECOLOGY

## 2020-10-09 PROCEDURE — P9045 ALBUMIN (HUMAN), 5%, 250 ML: HCPCS | Performed by: ANESTHESIOLOGY

## 2020-10-09 PROCEDURE — 37000008 HC ANESTHESIA 1ST 15 MINUTES: Performed by: OBSTETRICS & GYNECOLOGY

## 2020-10-09 RX ORDER — LIDOCAINE HCL/EPINEPHRINE/PF 2%-1:200K
VIAL (ML) INJECTION
Status: DISCONTINUED | OUTPATIENT
Start: 2020-10-09 | End: 2020-10-09

## 2020-10-09 RX ORDER — DEXAMETHASONE SODIUM PHOSPHATE 4 MG/ML
INJECTION, SOLUTION INTRA-ARTICULAR; INTRALESIONAL; INTRAMUSCULAR; INTRAVENOUS; SOFT TISSUE
Status: DISCONTINUED | OUTPATIENT
Start: 2020-10-09 | End: 2020-10-09

## 2020-10-09 RX ORDER — FENTANYL/BUPIVACAINE/NS/PF 2MCG/ML-.1
PLASTIC BAG, INJECTION (ML) INJECTION CONTINUOUS PRN
Status: DISCONTINUED | OUTPATIENT
Start: 2020-10-09 | End: 2020-10-09

## 2020-10-09 RX ORDER — OXYCODONE HYDROCHLORIDE 5 MG/1
5 TABLET ORAL EVERY 4 HOURS PRN
Status: DISCONTINUED | OUTPATIENT
Start: 2020-10-09 | End: 2020-10-10

## 2020-10-09 RX ORDER — CEFAZOLIN SODIUM 1 G/3ML
INJECTION, POWDER, FOR SOLUTION INTRAMUSCULAR; INTRAVENOUS
Status: DISCONTINUED | OUTPATIENT
Start: 2020-10-09 | End: 2020-10-09

## 2020-10-09 RX ORDER — ACETAMINOPHEN 10 MG/ML
INJECTION, SOLUTION INTRAVENOUS
Status: DISCONTINUED | OUTPATIENT
Start: 2020-10-09 | End: 2020-10-09

## 2020-10-09 RX ORDER — OXYTOCIN/RINGER'S LACTATE 30/500 ML
95 PLASTIC BAG, INJECTION (ML) INTRAVENOUS ONCE
Status: CANCELLED | OUTPATIENT
Start: 2020-10-09 | End: 2020-10-09

## 2020-10-09 RX ORDER — ACETAMINOPHEN 325 MG/1
650 TABLET ORAL EVERY 6 HOURS
Status: COMPLETED | OUTPATIENT
Start: 2020-10-09 | End: 2020-10-10

## 2020-10-09 RX ORDER — BUPIVACAINE HYDROCHLORIDE 2.5 MG/ML
INJECTION, SOLUTION EPIDURAL; INFILTRATION; INTRACAUDAL
Status: DISPENSED
Start: 2020-10-09 | End: 2020-10-09

## 2020-10-09 RX ORDER — LIDOCAINE HYDROCHLORIDE AND EPINEPHRINE 15; 5 MG/ML; UG/ML
INJECTION, SOLUTION EPIDURAL
Status: DISCONTINUED | OUTPATIENT
Start: 2020-10-09 | End: 2020-10-09

## 2020-10-09 RX ORDER — FENTANYL/BUPIVACAINE/NS/PF 2MCG/ML-.1
PLASTIC BAG, INJECTION (ML) INJECTION CONTINUOUS
Status: DISCONTINUED | OUTPATIENT
Start: 2020-10-09 | End: 2020-10-09 | Stop reason: HOSPADM

## 2020-10-09 RX ORDER — OXYTOCIN 10 [USP'U]/ML
INJECTION, SOLUTION INTRAMUSCULAR; INTRAVENOUS
Status: DISCONTINUED | OUTPATIENT
Start: 2020-10-09 | End: 2020-10-09

## 2020-10-09 RX ORDER — MORPHINE SULFATE 0.5 MG/ML
INJECTION, SOLUTION EPIDURAL; INTRATHECAL; INTRAVENOUS
Status: DISCONTINUED | OUTPATIENT
Start: 2020-10-09 | End: 2020-10-09

## 2020-10-09 RX ORDER — SODIUM CHLORIDE, SODIUM LACTATE, POTASSIUM CHLORIDE, CALCIUM CHLORIDE 600; 310; 30; 20 MG/100ML; MG/100ML; MG/100ML; MG/100ML
INJECTION, SOLUTION INTRAVENOUS CONTINUOUS
Status: CANCELLED | OUTPATIENT
Start: 2020-10-09 | End: 2020-10-09

## 2020-10-09 RX ORDER — KETOROLAC TROMETHAMINE 30 MG/ML
30 INJECTION, SOLUTION INTRAMUSCULAR; INTRAVENOUS EVERY 6 HOURS
Status: COMPLETED | OUTPATIENT
Start: 2020-10-09 | End: 2020-10-10

## 2020-10-09 RX ORDER — BUTORPHANOL TARTRATE 1 MG/ML
0.5 INJECTION INTRAMUSCULAR; INTRAVENOUS EVERY 4 HOURS PRN
Status: DISCONTINUED | OUTPATIENT
Start: 2020-10-09 | End: 2020-10-15 | Stop reason: HOSPADM

## 2020-10-09 RX ORDER — ONDANSETRON 2 MG/ML
4 INJECTION INTRAMUSCULAR; INTRAVENOUS EVERY 6 HOURS PRN
Status: DISCONTINUED | OUTPATIENT
Start: 2020-10-09 | End: 2020-10-10

## 2020-10-09 RX ORDER — FENTANYL CITRATE 50 UG/ML
INJECTION, SOLUTION INTRAMUSCULAR; INTRAVENOUS
Status: DISCONTINUED | OUTPATIENT
Start: 2020-10-09 | End: 2020-10-09

## 2020-10-09 RX ORDER — SODIUM CITRATE AND CITRIC ACID MONOHYDRATE 334; 500 MG/5ML; MG/5ML
30 SOLUTION ORAL ONCE
Status: DISCONTINUED | OUTPATIENT
Start: 2020-10-09 | End: 2020-10-10

## 2020-10-09 RX ORDER — SODIUM CHLORIDE, SODIUM LACTATE, POTASSIUM CHLORIDE, CALCIUM CHLORIDE 600; 310; 30; 20 MG/100ML; MG/100ML; MG/100ML; MG/100ML
INJECTION, SOLUTION INTRAVENOUS CONTINUOUS PRN
Status: DISCONTINUED | OUTPATIENT
Start: 2020-10-09 | End: 2020-10-09

## 2020-10-09 RX ORDER — ALBUMIN HUMAN 50 G/1000ML
12.5 SOLUTION INTRAVENOUS ONCE
Status: COMPLETED | OUTPATIENT
Start: 2020-10-09 | End: 2020-10-09

## 2020-10-09 RX ORDER — FENTANYL/BUPIVACAINE/NS/PF 2MCG/ML-.1
PLASTIC BAG, INJECTION (ML) INJECTION
Status: COMPLETED
Start: 2020-10-09 | End: 2020-10-09

## 2020-10-09 RX ORDER — PHENYLEPHRINE HYDROCHLORIDE 10 MG/ML
INJECTION INTRAVENOUS
Status: DISCONTINUED | OUTPATIENT
Start: 2020-10-09 | End: 2020-10-09

## 2020-10-09 RX ORDER — CHLOROPROCAINE HYDROCHLORIDE 20 MG/ML
INJECTION, SOLUTION EPIDURAL; INFILTRATION; INTRACAUDAL; PERINEURAL
Status: DISCONTINUED | OUTPATIENT
Start: 2020-10-09 | End: 2020-10-09

## 2020-10-09 RX ORDER — ALBUMIN HUMAN 50 G/1000ML
SOLUTION INTRAVENOUS
Status: DISPENSED
Start: 2020-10-09 | End: 2020-10-09

## 2020-10-09 RX ORDER — FAMOTIDINE 10 MG/ML
20 INJECTION INTRAVENOUS ONCE
Status: DISCONTINUED | OUTPATIENT
Start: 2020-10-09 | End: 2020-10-09

## 2020-10-09 RX ORDER — ONDANSETRON 2 MG/ML
INJECTION INTRAMUSCULAR; INTRAVENOUS
Status: DISCONTINUED | OUTPATIENT
Start: 2020-10-09 | End: 2020-10-09

## 2020-10-09 RX ORDER — ENOXAPARIN SODIUM 100 MG/ML
40 INJECTION SUBCUTANEOUS EVERY 24 HOURS
Status: DISCONTINUED | OUTPATIENT
Start: 2020-10-09 | End: 2020-10-15 | Stop reason: HOSPADM

## 2020-10-09 RX ORDER — OXYCODONE HYDROCHLORIDE 5 MG/1
10 TABLET ORAL EVERY 4 HOURS PRN
Status: DISCONTINUED | OUTPATIENT
Start: 2020-10-09 | End: 2020-10-10

## 2020-10-09 RX ADMIN — ACETAMINOPHEN 650 MG: 325 TABLET, FILM COATED ORAL at 07:10

## 2020-10-09 RX ADMIN — OXYTOCIN 6 UNITS: 10 INJECTION, SOLUTION INTRAMUSCULAR; INTRAVENOUS at 05:10

## 2020-10-09 RX ADMIN — KETOROLAC TROMETHAMINE 30 MG: 30 INJECTION, SOLUTION INTRAMUSCULAR; INTRAVENOUS at 07:10

## 2020-10-09 RX ADMIN — Medication 95 MILLI-UNITS/MIN: at 07:10

## 2020-10-09 RX ADMIN — CEFAZOLIN 2 G: 330 INJECTION, POWDER, FOR SOLUTION INTRAMUSCULAR; INTRAVENOUS at 05:10

## 2020-10-09 RX ADMIN — PHENYLEPHRINE HYDROCHLORIDE 200 MCG: 10 INJECTION INTRAVENOUS at 05:10

## 2020-10-09 RX ADMIN — BUTORPHANOL TARTRATE 0.5 MG: 1 INJECTION, SOLUTION INTRAMUSCULAR; INTRAVENOUS at 08:10

## 2020-10-09 RX ADMIN — DEXAMETHASONE SODIUM PHOSPHATE 4 MG: 4 INJECTION, SOLUTION INTRAMUSCULAR; INTRAVENOUS at 05:10

## 2020-10-09 RX ADMIN — DIPHENHYDRAMINE HYDROCHLORIDE 25 MG: 50 INJECTION, SOLUTION INTRAMUSCULAR; INTRAVENOUS at 08:10

## 2020-10-09 RX ADMIN — ENOXAPARIN SODIUM 40 MG: 40 INJECTION SUBCUTANEOUS at 06:10

## 2020-10-09 RX ADMIN — ONDANSETRON HYDROCHLORIDE 4 MG: 2 INJECTION INTRAMUSCULAR; INTRAVENOUS at 05:10

## 2020-10-09 RX ADMIN — ONDANSETRON 4 MG: 2 INJECTION INTRAMUSCULAR; INTRAVENOUS at 09:10

## 2020-10-09 RX ADMIN — ACETAMINOPHEN 650 MG: 325 TABLET, FILM COATED ORAL at 01:10

## 2020-10-09 RX ADMIN — Medication 1 MG: at 06:10

## 2020-10-09 RX ADMIN — LIDOCAINE HYDROCHLORIDE,EPINEPHRINE BITARTRATE 3 ML: 15; .005 INJECTION, SOLUTION EPIDURAL; INFILTRATION; INTRACAUDAL; PERINEURAL at 03:10

## 2020-10-09 RX ADMIN — Medication 5 ML: at 04:10

## 2020-10-09 RX ADMIN — KETOROLAC TROMETHAMINE 30 MG: 30 INJECTION, SOLUTION INTRAMUSCULAR; INTRAVENOUS at 08:10

## 2020-10-09 RX ADMIN — SODIUM CHLORIDE, SODIUM LACTATE, POTASSIUM CHLORIDE, AND CALCIUM CHLORIDE: 600; 310; 30; 20 INJECTION, SOLUTION INTRAVENOUS at 05:10

## 2020-10-09 RX ADMIN — CHLOROPROCAINE HYDROCHLORIDE 10 ML: 20 INJECTION, SOLUTION EPIDURAL; INFILTRATION; INTRACAUDAL; PERINEURAL at 05:10

## 2020-10-09 RX ADMIN — BUTALBITAL, ACETAMINOPHEN, AND CAFFEINE 1 TABLET: 50; 325; 40 TABLET ORAL at 12:10

## 2020-10-09 RX ADMIN — LIDOCAINE HYDROCHLORIDE,EPINEPHRINE BITARTRATE 5 ML: 20; .005 INJECTION, SOLUTION EPIDURAL; INFILTRATION; INTRACAUDAL; PERINEURAL at 06:10

## 2020-10-09 RX ADMIN — AZITHROMYCIN MONOHYDRATE 500 MG: 500 INJECTION, POWDER, LYOPHILIZED, FOR SOLUTION INTRAVENOUS at 06:10

## 2020-10-09 RX ADMIN — MAGNESIUM SULFATE IN WATER 2 G/HR: 40 INJECTION, SOLUTION INTRAVENOUS at 08:10

## 2020-10-09 RX ADMIN — SODIUM CHLORIDE, SODIUM LACTATE, POTASSIUM CHLORIDE, AND CALCIUM CHLORIDE 1000 ML: .6; .31; .03; .02 INJECTION, SOLUTION INTRAVENOUS at 03:10

## 2020-10-09 RX ADMIN — NIFEDIPINE 120 MG: 30 TABLET, FILM COATED, EXTENDED RELEASE ORAL at 08:10

## 2020-10-09 RX ADMIN — ACETAMINOPHEN 1000 MG: 10 INJECTION, SOLUTION INTRAVENOUS at 06:10

## 2020-10-09 RX ADMIN — Medication 10 ML/HR: at 04:10

## 2020-10-09 RX ADMIN — ALBUMIN (HUMAN) 12.5 G: 12.5 SOLUTION INTRAVENOUS at 10:10

## 2020-10-09 RX ADMIN — KETOROLAC TROMETHAMINE 30 MG: 30 INJECTION, SOLUTION INTRAMUSCULAR; INTRAVENOUS at 01:10

## 2020-10-09 RX ADMIN — DEXTROSE 3 MILLION UNITS: 50 INJECTION, SOLUTION INTRAVENOUS at 12:10

## 2020-10-09 RX ADMIN — SODIUM CHLORIDE, SODIUM LACTATE, POTASSIUM CHLORIDE, AND CALCIUM CHLORIDE 1000 ML: .6; .31; .03; .02 INJECTION, SOLUTION INTRAVENOUS at 04:10

## 2020-10-09 RX ADMIN — FENTANYL CITRATE 100 MCG: 50 INJECTION, SOLUTION INTRAMUSCULAR; INTRAVENOUS at 06:10

## 2020-10-09 RX ADMIN — PHENYLEPHRINE HYDROCHLORIDE 200 MCG: 10 INJECTION INTRAVENOUS at 06:10

## 2020-10-09 NOTE — NURSING
MFM notified that UO 75 since 0930AM. RN to flush ferro per MD. Ferro flushed with 40 NS. Stat CMP order per MD.

## 2020-10-09 NOTE — PROGRESS NOTES
"LABOR NOTE    S:  Complaints: No.  Epidural working:  not applicable  Resident to bedside for cervical check    O: BP (!) 142/65   Pulse 90   Temp 98.5 °F (36.9 °C) (Oral)   Resp 18   Ht 5' 1" (1.549 m)   Wt 101.5 kg (223 lb 11.2 oz)   LMP 2020   SpO2 97%   Breastfeeding No   BMI 42.27 kg/m²       FHT: 140, min-moderate variability, + accels, intermittent decels, but moderate variability. Overall reassuring, Cat 2  CTX: none  SVE: 0.5/40/-3 @1600 ferro firmly in place       ASSESSMENT:   35 y.o.  at 31w0d, with pre-e w/SF now being induced    FHT reassuring for gestational age on magnesium    Active Hospital Problems    Diagnosis  POA    *Pre-eclampsia in third trimester [O14.93]  Unknown    Rh negative state in antepartum period [O26.899, Z67.91]  Yes    Fetal heart rate non-reassuring affecting management of mother [O36.8390]  Yes    Cholestasis during pregnancy [O26.619, K83.1]  Yes    Hypothyroidism [E03.9]  Yes      Resolved Hospital Problems    Diagnosis Date Resolved POA    29 weeks gestation of pregnancy [Z3A.29] 2020 Not Applicable     Labor Course:  1200: 0.5/40/-3, ferro, will give PO cytotec  1600: unchanged, ferro in. Starting pitocin  2200 0.5/40/-3, ferro in place, pit was at 6 and down to 3 and turned off. Patient had 3 decels but discussion with staff will turn back on    PLAN:    Continue Close Maternal/Fetal Monitoring  Pitocin Augmentation per protocol  Recheck 4 hours or PRN    Continue to close monitor cat 2 tracing. Tracing for 31 weeks on magnesium. Minimal variability and decelerations.    When possible will attempt to AROM and internalize for visualization of contractions    Sánchez Rios MD  OBGYN PGY-2        "

## 2020-10-09 NOTE — NURSING
Anesthesia in room to place midline access. Stat CBC requested per Dr. Perez. Anesthesia will collect at this time.

## 2020-10-09 NOTE — ANESTHESIA PROCEDURE NOTES
Epidural    Patient location during procedure: OB   Reason for block: primary anesthetic   Diagnosis: IUP   Start time: 10/9/2020 3:55 AM  End time: 10/9/2020 4:05 AM  Surgery related to: Vaginal Delivery    Staffing  Performing Provider: Kike Potter MD  Authorizing Provider: Riley Deng MD        Preanesthetic Checklist  Completed: patient identified, site marked, surgical consent, pre-op evaluation, timeout performed, IV checked, risks and benefits discussed, monitors and equipment checked, anesthesia consent given, hand hygiene performed and patient being monitored  Preparation  Patient position: sitting  Prep: ChloraPrep  Patient monitoring: Pulse Ox  Epidural  Skin Anesthetic: lidocaine 1%  Skin Wheal: 3 mL  Administration type: continuous  Approach: midline  Interspace: L3-4    Injection technique: GARCIA air  Needle and Epidural Catheter  Needle type: Tuohy   Needle gauge: 17  Needle length: 3.5 inches  Needle insertion depth: 6 cm  Catheter type: springwSuperDerivatives  Catheter size: 19 G  Catheter at skin depth: 11 cm  Test dose: 3 mL of lidocaine 1.5% with Epi 1-to-200,000  Additional Documentation: incremental injection, negative aspiration for heme and CSF, no paresthesia on injection, no signs/symptoms of IV or SA injection, no significant pain on injection and no significant complaints from patient  Needle localization: anatomical landmarks  Medications:  Volume per aspiration: 5 mL  Time between aspirations: 5 minutes  Assessment  Ease of block: easy  Patient's tolerance of the procedure: comfortable throughout block and no complaintsNo inadvertent dural puncture with Tuohy.  Dural puncture performed with spinal needle.

## 2020-10-09 NOTE — NURSING
Dr. Saucedo contacted about CMP results. MD to contact attending about results. Keep kasie in per MD.

## 2020-10-09 NOTE — PROGRESS NOTES
MD to bedside @3847 for FHT deceleration. Upon entering room maternal repositioning being performed. Pitocin had already been turned off. FHTs back to 150s. SVE 5/80/-3, bulging bag, can palpate vertex. Patient positioned in high fowlers. FHTs now back up. Staff notified of tracing. As patient has made significant cervical change, will continue to monitor closely. Discussed if continued non reassuring FHT tracing, will proceed with c/s. Patient and her partner in agreement. All questions answered.     Guadalupe Ortiz M.D.  Obstetrics and Gynecology   PGY-4

## 2020-10-09 NOTE — LACTATION NOTE
Lactation note:    LC spoke with RN, pt BP have been unstable.    LC to room with SYmphony breast pump. Pt states she is not feeling well at this time. Reviewed NICU guide and breast pumping information, encouraged to call LC or RN when ready to start pumping for NICU baby. Encouraged to pump 8 or more times in 24hrs and hand express after, encouraged to call for demonstration. LC number on board, RN updated. Pt has [employer] PIS for home use and plans to bring to hospital to learn how to use it before discharge home.

## 2020-10-09 NOTE — NURSING
MFM called to check on ptn at this time. UO adequate. BP stable. Ptn c/o nausea and dizziness at this time. RN to discontinue magnesium drip at this time per MD request.

## 2020-10-09 NOTE — PROGRESS NOTES
10/09/20 1032   Vital Signs   Pulse 79   SpO2 (!) 93 %   BP (!) 95/46   MAP (mmHg) 67   Anesthesia notified, Dr. Perez to come check ptn. Ptn c/o feeling dizzy and nauseous at the moment.

## 2020-10-09 NOTE — PROGRESS NOTES
"LABOR NOTE    S:  Complaints: No.  Epidural working:  not applicable  Resident to bedside for cervical check    O: BP (!) 144/83   Pulse 86   Temp 98.5 °F (36.9 °C) (Oral)   Resp 18   Ht 5' 1" (1.549 m)   Wt 101.5 kg (223 lb 11.2 oz)   LMP 2020   SpO2 96%   Breastfeeding No   BMI 42.27 kg/m²       FHT: 140, minvariability, - accels, occasional deceleration, but inablity to monitor early/late due to toco showing no contraction Cat 2 overall reassuring  CTX: none  SVE: 0.5/40/-3      ASSESSMENT:   35 y.o.  at 31w0d, with pre-e w/SF now being induced    FHT reassuring for gestational age on magnesium    Active Hospital Problems    Diagnosis  POA    *Pre-eclampsia in third trimester [O14.93]  Unknown    Rh negative state in antepartum period [O26.899, Z67.91]  Yes    Fetal heart rate non-reassuring affecting management of mother [O36.8390]  Yes    Cholestasis during pregnancy [O26.619, K83.1]  Yes    Hypothyroidism [E03.9]  Yes      Resolved Hospital Problems    Diagnosis Date Resolved POA    29 weeks gestation of pregnancy [Z3A.29] 2020 Not Applicable     Labor Course:  1200: 0.5/40/-3, ferro, will give PO cytotec  1600: unchanged, ferro in. Starting pitocin  2200 0.5/40/-3, ferro in place, pit was at 6 and down to 3 and turned off. Patient had 3 decels but discussion with staff will turn back on  0200: 0.5/40/-2, ferro in place, pit at 8    PLAN:    Continue Close Maternal/Fetal Monitoring  Pitocin Augmentation per protocol  Recheck 4 hours or PRN    When possible will attempt to AROM and internalize for visualization of contractions    Sánchez Rios MD  OBGYN PGY-2        "

## 2020-10-09 NOTE — NURSING
Anesthesia called for BP 99/48 followed by BP 87/44. Karen Camejo (anesthesia) advised RN to put ptn in trendelenburg position and start LR drip wide open. BP recheck in trendelenburg 112/53. Ptn position changed from trendelenburg to lying with 15 deg head elevation per anesthesia request. BP recheck and 97/55. Anesthesia proceeded to give patient a dose on danae IV. Anesthesia to put in order for albumin.

## 2020-10-09 NOTE — L&D DELIVERY NOTE
Section Operative Note  Procedure Date: 10/12/2020    Procedure: Primary  Section via Pfannenstiel  skin incision    Indications:   Non reassuring fetal status with recurrent FHT decelerations noted. Pitocin had been off for roughly 1 hour with continued maternal resuscitation being performed. SVE 5/80/-2, decision made to AROM and place IUPC/FSE. After AROM, vertex palpated however there was a hand presentation. FHTs 120s. Given compound presentation and continued late decelerations, decision made to proceed with LTCS. Patient and her partner in agreement.       Pre-operative Diagnosis: IUP at 31 week 1 day pregnancy    Post-operative Diagnosis: same    Surgeon: Harmony Singh MD     Assistants: Guadalupe Ortiz MD PGY-4    Anesthesia: Epidural anesthesia    Findings:   1. Upon entering the peritoneal cavity, large amount of ascites/pleural fluid noted, roughly 1.2 L suctioned. Careful inspection of the bladder showed no deficits or rupture.Ascites thought to be secondary to third spacing from disease process of preeclampsia  2. Infant in vertex presentation with compound hand presentation, delivered atraumatically   3. Adhesive disease restricting exteriorizing the uterus. Adhesive disease noted on the left posterior aspect of the uterus. Evidence of previous left cystectomy. Evidence of endometriosis with powder burn implants on the ovaries and notable on the omentum.     Estimated Blood Loss:  600           Total IV Fluids: 0 mL     UOP: 75cc mL    Specimens: single viable male infant , Placenta which was handed off to pathology. Peritoneal fluid sent off for cytology.   PreOp CBC:   Lab Results   Component Value Date    WBC 18.02 (H) 10/09/2020    HGB 13.3 10/09/2020    HCT 39.7 10/09/2020    MCV 88 10/09/2020     10/09/2020                     Complications:  None; patient tolerated the procedure well.           Disposition: PACU - hemodynamically stable.           Condition:  stable    Procedure Details   The patient was seen in the Labor room. I discussed the non reassuring fetal heart rate tracing and hand presentation. The risks, benefits, complications, treatment options, and expected outcomes were discussed with the patient.  The patient concurred with the proposed plan, giving informed consent.  The site of surgery properly noted/marked. The patient was taken to Operating Room, identified as Shanae Vo and the procedure verified as Primary  Delivery. A Time Out was held and the above information confirmed.    After induction of anesthesia, the patient was prepped and draped in the usual sterile manner while placed in a dorsal supine position with a left lateral tilt.  A ferro catheter was also placed per nursing. Preoperative antibiotics ancef and azithromycin were administered and an allis test was performed yielding adequate anesthesia.  A Pfannenstiel incision was made and carried down through the subcutaneous tissue to the fascia. Fascial incision was made and extended transversely. The fascia was grasped with Kocher clamps and  from the underlying rectus tissue superiorly and inferiorly. The peritoneum was identified, found to be free of adherent bowl and entered bluntly. At this time a large amount of peritoneal fluid was noted, we suctioned roughly 1.2L of fluid.  Peritoneal incision was extended longitudinally. The vesico-uterine peritoneum was identified and bladder blade was inserted. We checked and palpated the ferro balloon to make sure the bladder was not injured and confirmed no injury.  A low transverse uterine incision was made with knife and extended in a cephalo caudal direction. The infant was noted to be in vertex position with a compound hand presentation.  The head was brought to the incision and elevated out of the pelvis. The patient delivered a single viable male infant without difficulty.  After the umbilical cord was clamped and  cut cord blood was obtained for evaluation. Cord gases were obtained.  Infant was handed off to awaiting NICU team.  The placenta was removed intact and appeared normal and was sent to pathology. The uterus was unable to be exteriorized. We palpated the posterior aspect of the uterus and noted adhesive disease to the bowel to the uterus and evidence of previous left ovarian cystectomy. The left ovary was adhered to the posterior aspect of the uterus. There was a questionable arcuate shape to the uterus. The right ovary and tube looked grossly normal. We did notice an endometrial implant on the right ovary and omentum. The uterine incision was closed with running locked sutures of 1-0 chromic, a second imbricating layer was performed. Hemostasis was observed. Incision was reinspected and good hemostasis was noted. The fascia was then reapproximated with running sutures of 0 PDS. The subcutaneous fat and skin was reapproximated with 2-0 Vicryl and 4-0 monocryl respectively.    Instrument, sponge, and needle counts were correct prior the abdominal closure and at the conclusion of the case.     Pt tolerated procedure well and Dr. Singh discussed with family that pt was stable and in good condition after the procedure.    I was present for the entire procedure/operation and agree with above resident documentation.     Harmony Singh MD           Delivery Information for Markos Vo    Birth information:  YOB: 2021   Time of birth: 5:53 AM   Sex: male   Head Delivery Date/Time: 10/9/2020  5:53 AM   Delivery type: , Low Transverse   Gestational Age: 31w1d    Delivery Providers    Delivering clinician: Harmony Singh MD           Measurements    Weight:   Length:          Apgars    Living status:   Apgars:  1 min.:  5 min.:  10 min.:  15 min.:  20 min.:    Skin color:         Heart rate:         Reflex irritability:         Muscle tone:         Respiratory effort:         Total:                 Operative Delivery    Forceps attempted?: No  Vacuum extractor attempted?: No         Shoulder Dystocia    Shoulder dystocia present?: No           Presentation    Presentation: Vertex  Position: Occiput Anterior           Interventions/Resuscitation    Method: NICU Attended       Cord    Vessels: 3 vessels  Complications: None  Delayed Cord Clamping?: No  Cord Blood Disposition: Sent with Baby  Gases Sent?: Yes  Stem Cell Collection (by MD): No       Placenta    Placenta delivery date/time: 10/9/2020 0555  Placenta removal: Manual removal  Placenta appearance: Intact  Placenta disposition: pathology           Labor Events:       labor:       Labor Onset Date/Time:         Dilation Complete Date/Time:         Start Pushing Date/Time:         Start Pushing Date/Time:       Rupture Date/Time: 10/09/20  0530         Rupture type:          Fluid Amount:       Fluid Color: Clear      Fluid Odor:       Membrane Status: ARM (Artificial Rupture)               steroids:       Antibiotics given for GBS:       Induction:       Indications for induction:        Augmentation:       Indications for augmentation:       Labor complications:       Additional complications:          Cervical ripening:                     Delivery:      Episiotomy:       Indication for Episiotomy:       Perineal Lacerations:   Repaired:      Periurethral Laceration:   Repaired:     Labial Laceration:   Repaired:     Sulcus Laceration:   Repaired:     Vaginal Laceration:   Repaired:     Cervical Laceration:   Repaired:     Repair suture:       Repair # of packets:       Last Value - EBL - Nursing (mL):       Sum - EBL - Nursing (mL): 0     Last Value - EBL - Anesthesia (mL):      Calculated QBL (mL): 545      Vaginal Sweep Performed:       Surgicount Correct:         Other providers:       Anesthesia    Method: Epidural          Details (if applicable):  Trial of Labor Yes    Categorization: Primary     Priority: Emergency   Indications for : Malposition   Incision Type: low transverse     Additional  information:  Forceps:    Vacuum:    Breech:    Observed anomalies    Other (Comments):         Guadalupe Ortiz M.D.  Obstetrics and Gynecology   PGY-4

## 2020-10-09 NOTE — CARE UPDATE
Called by nursing on mother baby unit that patient with symptomatic hypotension, SBP 80s, MAP < 65 mmHg. Nursing instructed to start a IVF bolus and place patient in trendelenburg. Dr. Camejo and Dr. Munson at bedside to examine patient, phenylephrine boluses given (total 400mcg), and  5% albumin ordered. A second IV (midline) was placed in the LUE. On chart review, patient was given Procardia-XL in recovery. Suspect mixed etiology of shock, vasodilatory 2/2 CCB and hypovolemia from blood loss and ascites loss during  today. Will continue to monitor.    Riley Perez MD  Resident Physician, PGY3  Department of Anesthesiology

## 2020-10-09 NOTE — PLAN OF CARE
Problem:  Fall Injury Risk  Goal: Absence of Fall, Infant Drop and Related Injury  Outcome: Ongoing, Progressing     Problem: Adult Inpatient Plan of Care  Goal: Plan of Care Review  Outcome: Ongoing, Progressing  Goal: Patient-Specific Goal (Individualization)  Outcome: Ongoing, Progressing  Goal: Absence of Hospital-Acquired Illness or Injury  Outcome: Ongoing, Progressing  Goal: Optimal Comfort and Wellbeing  Outcome: Ongoing, Progressing  Goal: Readiness for Transition of Care  Outcome: Ongoing, Progressing  Goal: Rounds/Family Conference  Outcome: Ongoing, Progressing     Problem: Maternal-Fetal Wellbeing  Goal: Optimal Maternal-Fetal Wellbeing  Outcome: Ongoing, Progressing     Problem: Hypertensive Disorders in Pregnancy  Goal: Maternal-Fetal Stabilization  Outcome: Ongoing, Progressing     Problem: Bariatric Environmental Safety  Goal: Safety Maintained with Care  Outcome: Ongoing, Progressing     Problem: Infection  Goal: Infection Symptom Resolution  Outcome: Ongoing, Progressing     Problem:  Labor  Goal: Delayed  Delivery  Outcome: Ongoing, Progressing     Problem: Skin Injury Risk Increased  Goal: Skin Health and Integrity  Outcome: Ongoing, Progressing     Problem: Breastfeeding  Goal: Effective Breastfeeding  Outcome: Ongoing, Progressing

## 2020-10-09 NOTE — ANESTHESIA PREPROCEDURE EVALUATION
[facility]  Anesthesia Pre-Operative Evaluation         Patient Name: Shanae Vo  YOB: 1985  MRN: [id number]    10/09/2020      Shanae Vo is a 35 y.o. female  @ 30w0d who presents for PreE. This IUP is complicated by obesity, cholestasis with persistent itching (elevated bile acids,  on 44), pre-eclampsia withOUT severe features.      OB History    Para Term  AB Living   1 0           SAB TAB Ectopic Multiple Live Births                  # Outcome Date GA Lbr Judson/2nd Weight Sex Delivery Anes PTL Lv   1 Current                Review of patient's allergies indicates:   Allergen Reactions    Shrimp Nausea And Vomiting       Wt Readings from Last 1 Encounters:   10/08/20 0550 101.5 kg (223 lb 11.2 oz)   10/07/20 0610 101.2 kg (223 lb)   10/06/20 0853 100 kg (220 lb 6.4 oz)   10/04/20 1215 97.7 kg (215 lb 4.5 oz)   10/03/20 0604 98.8 kg (217 lb 11.3 oz)   20 2355 91.6 kg (202 lb)       BP Readings from Last 3 Encounters:   10/09/20 (!) 153/84   20 (!) 153/92   20 120/66       Patient Active Problem List   Diagnosis    Endometrioma of ovary    Itching    Hypothyroidism    Current maternal condition affecting pregnancy    Pre-eclampsia in third trimester    Fetal heart rate non-reassuring affecting management of mother    Cholestasis during pregnancy    Rh negative state in antepartum period       Past Surgical History:   Procedure Laterality Date    DIAGNOSTIC LAPAROSCOPY N/A 10/28/2019    Procedure: LAPAROSCOPY, DIAGNOSTIC;  Surgeon: Luis Armando Vega MD;  Location: [facility] OR;  Service: OB/GYN;  Laterality: N/A;    LAPAROSCOPIC SURGICAL REMOVAL OF CYST OF OVARY Left 10/28/2019    Procedure: EXCISION, CYST, OVARY, LAPAROSCOPIC, LEFT;  Surgeon: Luis Armando Vega MD;  Location: [facility] OR;  Service: OB/GYN;  Laterality: Left;    WISDOM TOOTH EXTRACTION         Social History     Socioeconomic History    Marital  status:      Spouse name: Not on file    Number of children: Not on file    Years of education: Not on file    Highest education level: Not on file   Occupational History    Not on file   Social Needs    Financial resource strain: Not on file    Food insecurity     Worry: Not on file     Inability: Not on file    Transportation needs     Medical: Not on file     Non-medical: Not on file   Tobacco Use    Smoking status: Never Smoker   Substance and Sexual Activity    Alcohol use: Not Currently     Comment: social    Drug use: Never    Sexual activity: Yes     Partners: Male     Birth control/protection: None   Lifestyle    Physical activity     Days per week: Not on file     Minutes per session: Not on file    Stress: Not on file   Relationships    Social connections     Talks on phone: Not on file     Gets together: Not on file     Attends [employer] service: Not on file     Active member of club or organization: Not on file     Attends meetings of clubs or organizations: Not on file     Relationship status: Not on file   Other Topics Concern    Not on file   Social History Narrative    Not on file         Chemistry        Component Value Date/Time     (L) 10/08/2020 0713    K 4.6 10/08/2020 0713     10/08/2020 0713    CO2 16 (L) 10/08/2020 0713    BUN 19 10/08/2020 0713    CREATININE 0.7 10/08/2020 0713    GLU 84 10/08/2020 0713        Component Value Date/Time    CALCIUM 8.8 10/08/2020 0713    ALKPHOS 162 (H) 10/08/2020 0713    AST 26 10/08/2020 0713    ALT 19 10/08/2020 0713    BILITOT 0.2 10/08/2020 0713    ESTGFRAFRICA >60 10/08/2020 0713    EGFRNONAA >60 10/08/2020 0713            Lab Results   Component Value Date    WBC 11.27 10/08/2020    HGB 13.6 10/08/2020    HCT 40.6 10/08/2020    MCV 88 10/08/2020     10/08/2020       No results for input(s): PT, INR, PROTIME, APTT in the last 72 hours.          Pre-op Assessment    I have reviewed the Patient Summary Reports.      I have reviewed the Nursing Notes.    I have reviewed the Medications.     Review of Systems  Anesthesia Hx:  No problems with previous Anesthesia Denies Hx of Anesthetic complications  History of prior surgery of interest to airway management or planning: Denies Family Hx of Anesthesia complications.   Denies Personal Hx of Anesthesia complications.   Social:  No Alcohol Use, Non-Smoker    Hematology/Oncology:  Hematology Normal        Cardiovascular:  Cardiovascular Normal  ECG has been reviewed.    Pulmonary:  Pulmonary Normal    Renal/:  Renal/ Normal     Musculoskeletal:  Musculoskeletal Normal    Neurological:  Neurology Normal        Physical Exam  General:  Well nourished    Airway/Jaw/Neck:  Airway Findings: Mouth Opening: Normal Tongue: Normal  General Airway Assessment: Adult  Mallampati: I  TM Distance: Normal, at least 6 cm  Jaw/Neck Findings:  Neck ROM: Normal ROM     Eyes/Ears/Nose:  EYES/EARS/NOSE FINDINGS: Normal   Dental:  Dental Findings: In tact   Chest/Lungs:  Chest/Lungs Findings: Clear to auscultation     Heart/Vascular:  Heart Findings: Rate: Normal  Rhythm: Regular Rhythm  Sounds: Normal     Abdomen:  Abdomen Findings:  Normal     Musculoskeletal:  Musculoskeletal Findings:    Skin:  Skin Findings:     Mental Status:  Mental Status Findings:  Cooperative, Alert and Oriented         Anesthesia Plan  Type of Anesthesia, risks & benefits discussed:  Anesthesia Type:  epidural, general, CSE, spinal  Patient's Preference:   Intra-op Monitoring Plan: standard ASA monitors  Intra-op Monitoring Plan Comments:   Post Op Pain Control Plan: multimodal analgesia  Post Op Pain Control Plan Comments:   Induction:   rapid sequence  Beta Blocker:  Patient is not currently on a Beta-Blocker (No further documentation required).       Informed Consent: Patient understands risks and agrees with Anesthesia plan.  Questions answered. Anesthesia consent signed with patient.  ASA Score: 2     Day of Surgery  Review of History & Physical:    H&P update referred to the provider.         Ready For Surgery From Anesthesia Perspective.

## 2020-10-09 NOTE — PROGRESS NOTES
10/09/20 0445   Tele[facility] Note - Strip   Strip Reviewed by [facility] Nurse? Yes   [facility] Note - Communication   [place] Nurse Communicated with Bedside Nurse Regarding: Fetal Status;Maternal Vital Signs  (NRFHT's, blood pressures)   [facility] Note - Notification   Nurse Notified? Yes   Name of Nurse Marie ALLEN CN

## 2020-10-09 NOTE — NURSING
RN attempted to contact MD a second time to clarify a few orders for ptn, unable to contact. Ptn remains off of magnesium since 1036 this morning when discontinued by DIMAS OWUSU at bedside. MD aware that patient is receiving no fluids per previous communication. Ptn remains on Q1H VS since her hypotensive episode this am. VSS. Ptn to receive Lovenox around 1900. SCD's off per ptn request at this time. UO has increased since low UO recorded at 12pm. MD aware Na 128 and Ca 7.5, no new orders placed after notification.

## 2020-10-10 LAB
ABO + RH BLD: NORMAL
ALBUMIN SERPL BCP-MCNC: 2.2 G/DL (ref 3.5–5.2)
ALP SERPL-CCNC: 168 U/L (ref 55–135)
ALT SERPL W/O P-5'-P-CCNC: 17 U/L (ref 10–44)
ANION GAP SERPL CALC-SCNC: 9 MMOL/L (ref 8–16)
AST SERPL-CCNC: 33 U/L (ref 10–40)
BILIRUB SERPL-MCNC: 0.3 MG/DL (ref 0.1–1)
BLD GP AB SCN CELLS X3 SERPL QL: NORMAL
BUN SERPL-MCNC: 21 MG/DL (ref 6–20)
CALCIUM SERPL-MCNC: 8 MG/DL (ref 8.7–10.5)
CHLORIDE SERPL-SCNC: 106 MMOL/L (ref 95–110)
CO2 SERPL-SCNC: 18 MMOL/L (ref 23–29)
CREAT SERPL-MCNC: 0.8 MG/DL (ref 0.5–1.4)
EST. GFR  (AFRICAN AMERICAN): >60 ML/MIN/1.73 M^2
EST. GFR  (NON AFRICAN AMERICAN): >60 ML/MIN/1.73 M^2
FETAL CELL SCN BLD QL ROSETTE: NORMAL
GLUCOSE SERPL-MCNC: 102 MG/DL (ref 70–110)
INJECT RH IG VOL PATIENT: NORMAL ML
POTASSIUM SERPL-SCNC: 4.4 MMOL/L (ref 3.5–5.1)
PROT SERPL-MCNC: 5.9 G/DL (ref 6–8.4)
SODIUM SERPL-SCNC: 133 MMOL/L (ref 136–145)

## 2020-10-10 PROCEDURE — 25000003 PHARM REV CODE 250: Performed by: STUDENT IN AN ORGANIZED HEALTH CARE EDUCATION/TRAINING PROGRAM

## 2020-10-10 PROCEDURE — 63600175 PHARM REV CODE 636 W HCPCS: Performed by: STUDENT IN AN ORGANIZED HEALTH CARE EDUCATION/TRAINING PROGRAM

## 2020-10-10 PROCEDURE — 85461 HEMOGLOBIN FETAL: CPT

## 2020-10-10 PROCEDURE — 36415 COLL VENOUS BLD VENIPUNCTURE: CPT

## 2020-10-10 PROCEDURE — 99231 PR SUBSEQUENT HOSPITAL CARE,LEVL I: ICD-10-PCS | Mod: ,,, | Performed by: OBSTETRICS & GYNECOLOGY

## 2020-10-10 PROCEDURE — 99900035 HC TECH TIME PER 15 MIN (STAT)

## 2020-10-10 PROCEDURE — 11000001 HC ACUTE MED/SURG PRIVATE ROOM

## 2020-10-10 PROCEDURE — 86850 RBC ANTIBODY SCREEN: CPT

## 2020-10-10 PROCEDURE — 63600519 RHOGAM PHARM REV CODE 636 ALT 250 W HCPCS: Performed by: STUDENT IN AN ORGANIZED HEALTH CARE EDUCATION/TRAINING PROGRAM

## 2020-10-10 PROCEDURE — 99231 SBSQ HOSP IP/OBS SF/LOW 25: CPT | Mod: ,,, | Performed by: OBSTETRICS & GYNECOLOGY

## 2020-10-10 PROCEDURE — 80053 COMPREHEN METABOLIC PANEL: CPT

## 2020-10-10 RX ORDER — ADHESIVE BANDAGE
30 BANDAGE TOPICAL 2 TIMES DAILY PRN
Status: DISCONTINUED | OUTPATIENT
Start: 2020-10-10 | End: 2020-10-15 | Stop reason: HOSPADM

## 2020-10-10 RX ORDER — BISACODYL 10 MG
10 SUPPOSITORY, RECTAL RECTAL ONCE AS NEEDED
Status: DISCONTINUED | OUTPATIENT
Start: 2020-10-10 | End: 2020-10-15 | Stop reason: HOSPADM

## 2020-10-10 RX ORDER — OXYCODONE AND ACETAMINOPHEN 5; 325 MG/1; MG/1
1 TABLET ORAL EVERY 4 HOURS PRN
Status: DISCONTINUED | OUTPATIENT
Start: 2020-10-10 | End: 2020-10-15 | Stop reason: HOSPADM

## 2020-10-10 RX ORDER — HYDROCORTISONE 25 MG/G
CREAM TOPICAL 3 TIMES DAILY PRN
Status: DISCONTINUED | OUTPATIENT
Start: 2020-10-10 | End: 2020-10-15 | Stop reason: HOSPADM

## 2020-10-10 RX ORDER — PRENATAL WITH FERROUS FUM AND FOLIC ACID 3080; 920; 120; 400; 22; 1.84; 3; 20; 10; 1; 12; 200; 27; 25; 2 [IU]/1; [IU]/1; MG/1; [IU]/1; MG/1; MG/1; MG/1; MG/1; MG/1; MG/1; UG/1; MG/1; MG/1; MG/1; MG/1
1 TABLET ORAL DAILY
Status: DISCONTINUED | OUTPATIENT
Start: 2020-10-10 | End: 2020-10-15 | Stop reason: HOSPADM

## 2020-10-10 RX ORDER — IBUPROFEN 600 MG/1
600 TABLET ORAL EVERY 6 HOURS
Status: DISCONTINUED | OUTPATIENT
Start: 2020-10-10 | End: 2020-10-15 | Stop reason: HOSPADM

## 2020-10-10 RX ORDER — FUROSEMIDE 10 MG/ML
20 INJECTION INTRAMUSCULAR; INTRAVENOUS ONCE
Status: COMPLETED | OUTPATIENT
Start: 2020-10-10 | End: 2020-10-10

## 2020-10-10 RX ORDER — CALCIUM CARBONATE 200(500)MG
500 TABLET,CHEWABLE ORAL 2 TIMES DAILY PRN
Status: DISPENSED | OUTPATIENT
Start: 2020-10-10 | End: 2020-10-13

## 2020-10-10 RX ORDER — OXYCODONE AND ACETAMINOPHEN 10; 325 MG/1; MG/1
1 TABLET ORAL EVERY 4 HOURS PRN
Status: DISCONTINUED | OUTPATIENT
Start: 2020-10-10 | End: 2020-10-15 | Stop reason: HOSPADM

## 2020-10-10 RX ORDER — AMOXICILLIN 250 MG
1 CAPSULE ORAL NIGHTLY PRN
Status: DISCONTINUED | OUTPATIENT
Start: 2020-10-10 | End: 2020-10-15 | Stop reason: HOSPADM

## 2020-10-10 RX ORDER — SIMETHICONE 80 MG
1 TABLET,CHEWABLE ORAL EVERY 6 HOURS PRN
Status: DISCONTINUED | OUTPATIENT
Start: 2020-10-10 | End: 2020-10-15 | Stop reason: HOSPADM

## 2020-10-10 RX ORDER — MUPIROCIN 20 MG/G
OINTMENT TOPICAL 2 TIMES DAILY
Status: COMPLETED | OUTPATIENT
Start: 2020-10-10 | End: 2020-10-14

## 2020-10-10 RX ORDER — DOCUSATE SODIUM 100 MG/1
200 CAPSULE, LIQUID FILLED ORAL 2 TIMES DAILY
Status: DISCONTINUED | OUTPATIENT
Start: 2020-10-10 | End: 2020-10-15 | Stop reason: HOSPADM

## 2020-10-10 RX ORDER — DIPHENHYDRAMINE HCL 25 MG
25 CAPSULE ORAL EVERY 4 HOURS PRN
Status: DISCONTINUED | OUTPATIENT
Start: 2020-10-10 | End: 2020-10-15 | Stop reason: HOSPADM

## 2020-10-10 RX ORDER — ONDANSETRON 8 MG/1
8 TABLET, ORALLY DISINTEGRATING ORAL EVERY 8 HOURS PRN
Status: DISCONTINUED | OUTPATIENT
Start: 2020-10-10 | End: 2020-10-15 | Stop reason: HOSPADM

## 2020-10-10 RX ADMIN — IBUPROFEN 600 MG: 600 TABLET ORAL at 05:10

## 2020-10-10 RX ADMIN — DOCUSATE SODIUM 200 MG: 100 CAPSULE, LIQUID FILLED ORAL at 08:10

## 2020-10-10 RX ADMIN — FUROSEMIDE 20 MG: 10 INJECTION, SOLUTION INTRAMUSCULAR; INTRAVENOUS at 09:10

## 2020-10-10 RX ADMIN — KETOROLAC TROMETHAMINE 30 MG: 30 INJECTION, SOLUTION INTRAMUSCULAR; INTRAVENOUS at 02:10

## 2020-10-10 RX ADMIN — DOCUSATE SODIUM 200 MG: 100 CAPSULE, LIQUID FILLED ORAL at 09:10

## 2020-10-10 RX ADMIN — OXYCODONE HYDROCHLORIDE AND ACETAMINOPHEN 1 TABLET: 10; 325 TABLET ORAL at 11:10

## 2020-10-10 RX ADMIN — IBUPROFEN 600 MG: 600 TABLET ORAL at 11:10

## 2020-10-10 RX ADMIN — MUPIROCIN: 20 OINTMENT TOPICAL at 09:10

## 2020-10-10 RX ADMIN — ACETAMINOPHEN 650 MG: 325 TABLET, FILM COATED ORAL at 09:10

## 2020-10-10 RX ADMIN — HUMAN RHO(D) IMMUNE GLOBULIN 300 MCG: 300 INJECTION, SOLUTION INTRAMUSCULAR at 09:10

## 2020-10-10 RX ADMIN — CALCIUM CARBONATE (ANTACID) CHEW TAB 500 MG 500 MG: 500 CHEW TAB at 09:10

## 2020-10-10 RX ADMIN — OXYCODONE HYDROCHLORIDE AND ACETAMINOPHEN 1 TABLET: 10; 325 TABLET ORAL at 01:10

## 2020-10-10 RX ADMIN — LEVOTHYROXINE SODIUM 50 MCG: 25 TABLET ORAL at 07:10

## 2020-10-10 RX ADMIN — ACETAMINOPHEN 650 MG: 325 TABLET, FILM COATED ORAL at 02:10

## 2020-10-10 RX ADMIN — ENOXAPARIN SODIUM 40 MG: 40 INJECTION SUBCUTANEOUS at 05:10

## 2020-10-10 RX ADMIN — MUPIROCIN: 20 OINTMENT TOPICAL at 08:10

## 2020-10-10 NOTE — PLAN OF CARE
Lactation note:  LC visit to room to review pumping for an NICU baby. Gave mother NICU Mother's Breastfeeding Guide. Showed mother how to work pump, how to keep track of pumpings, how to label nicu breastmilk, how to clean pump parts and bring milk to NICU even if it is only a drop of milk. NICU uses mother's milk for mouth care so even small amounts are ok to bring to NICU. Mother aware to pump 8 or more times a day. Showed mother how to use Symphony pump on initiate setting and how to perform hand expression. Drops of milk obtained with pumping and hand expression. Call RN with any further questions.Mother may want to start thinking about what she will pump with when she gets home. She has a medela breast pump for home at this time.  phone number on board for assistance as needed.

## 2020-10-10 NOTE — ANESTHESIA POSTPROCEDURE EVALUATION
Anesthesia Post Evaluation    Patient: Shanae Vo    Procedure(s) Performed: * No procedures listed *    Final Anesthesia Type: epidural    Patient location during evaluation: floor  Patient participation: Yes- Able to Participate  Level of consciousness: awake and alert  Post-procedure vital signs: reviewed and stable  Pain management: adequate  Airway patency: patent    PONV status at discharge: No PONV  Anesthetic complications: no      Cardiovascular status: blood pressure returned to baseline and hemodynamically stable  Respiratory status: unassisted, spontaneous ventilation and room air  Hydration status: euvolemic  Follow-up not needed.          Vitals Value Taken Time   /88 10/10/20 0737   Temp 36.7 °C (98.1 °F) 10/10/20 0737   Pulse 104 10/10/20 0737   Resp 17 10/10/20 0737   SpO2 98 % 10/10/20 0737         No case tracking events are documented in the log.      Pain/Rosa Score: Pain Rating Prior to Med Admin: 4 (10/10/2020 11:15 AM)  Pain Rating Post Med Admin: 0 (10/10/2020  3:45 AM)

## 2020-10-10 NOTE — NURSING
MD updated RN on plan for now. BP monitoring Q2H for now. Ok to dc mag orders. SCDs on patient. BP to be checked before administering AM dose of procardia and MD will adjust dose if needed.

## 2020-10-10 NOTE — DISCHARGE INSTRUCTIONS
Preparation and Hygiene:    1. Shower daily.  2. Wear a clean bra each day and wash daily in warm soapy water.  3. Change wet or moist breast pads frequently.  Moist pads can promote growth of germs.  4. Actively wash your hands, paying close attention to the area around and under your fingernails, thoroughly with soap and water for 15 seconds before pumping or handling your milk.  Re-wash your hands if you touch anything (scratching your nose, answering the phone, etc) while pumping or handling your milk.   5. Before pumping your breasts, assemble the pump collection kit and have ready the sterile container and labels.  Place these items on a clean surface next to the breastpump.  6. Each time after you have finished pumping, take apart all of the parts of the breastpump collection kit and place them in a separate cleaning container (do not place them in the sink).  Be sure to remove the yellow valve from the breastshield and separate the white membrane from the yellow valve.  Rinse all of these parts with cool water.  Then use a new sponge and/or bottle brush and dishwashing detergent to clean the parts.  Rinse off the soapy water with cool water and air dry on a clean towel covered with a clean cloth.  All parts may also be washed after each use in the top rack of a .  7. Once each day, sterilize all of the parts of the breastpump collection kit.  This can be done by boiling the kit parts for 10 minutes or by using a Quick Clean Micro-Steam Bag made by [employer]  8. If condensation appears in the tubing, continue to run the pump with the tubing attached for 1-2 minutes or until the tubing is dry.   9. Notify your babys nurse or doctor if you become ill or need to take any medication, even over-the-counter medicines.        Collection and Storage of Expressed Breastmilk:         1. Pump your breasts at least 8-10 times every 24 hours.  Double pump (both breasts at  the same time) for at least 15-20  minutes using the most suction that is comfortable.    2. Write the date and time of pumping and the name of any medications you are takingon the babys pre-printed hospital identification label.   3. Place your babys pre-printed hospital identification label on each container of breastmilk.  Additional pre-printed labels can be obtained from your babys nurse.  If your expressed breastmilk is not correctly labeled, the nurse cannot feed the milk to the baby.       4. Place a brightly colored sticker on the top of each container of milk pumped during the first 30 days.  This identifies the milk as special and having higher levels of nutrients and anti-infective properties that are so important for your baby.  Additional stickers can be obtained from the lactation consultants or your babys nurse.  5.   Do not touch the inside of the storage containers or lids.  6.      Pour the amount of expressed milk needed for 1 of your babys feedings into each   storage container. Use a new container(s) for each pumping.  Additional storage   containers can be obtained from your babys nurse.        7.       Tightly screw the lid onto the container and place immediately into the       refrigerator fordaily transportation to the hospital.   Do not freeze your milk      unless asked to do so by your babys nurse.  However, if you are not able to      visit your baby each day, place the expressed breastmilk in the freezer.  8.   Expressed breastmilk should be refrigerated or frozen within 1 hour of      pumping.  9.      Do not store expressed breastmilk on the door of your refrigerator or freezer where the temperature is warmer.         Transportation of Expressed Breastmilk:    1. Refrigerated breastmilk or frozen milk should be packed tightly together in a cooler with frozen, blue gel-packs to keep the milk frozen.  DO NOT USE ICE CUBES (WET ICE) TO TRANSPORT FROZEN MILK.   A clean towel can be used to fill any extra space  between containers of frozen milk.  2.    Bring your expressed milk from home each time you visit the baby.      NICU lactation warmline:  [phone]  MBU lactation warmline:  [phone]

## 2020-10-10 NOTE — LACTATION NOTE
10/10/20 1245   Maternal Assessment   Breast Shape Bilateral:;angled;wide   Breast Density Bilateral:;soft   Areola Bilateral:;elastic   Nipples Bilateral:;everted   Equipment Type   Breast Pump Type double electric, hospital grade   Breast Pump Flange Type hard   Breast Pump Flange Size 24 mm   Breast Pumping   Breast Pumping Interventions frequent pumping encouraged   Breast Pumping bilateral breasts pumped until soft;pre-pumping breast massage;pre-pumping hand expression

## 2020-10-10 NOTE — PROGRESS NOTES
MD @ bedside to assess pt. Pt has lower extremity swelling and  c/o SOB with position. MD ordered chest x-ray and incentive spirometer use. Will continue to monitor.

## 2020-10-10 NOTE — PROGRESS NOTES
POSTPARTUM PROGRESS NOTE    Shanae Vo is a 35 y.o. female POD #1 status post Primary  section at 31w1d in a pregnancy complicated by preeclampsia, cholestasis, and hypothyroidism. Patient is doing well this morning. She denies nausea, vomiting, fever or chills. No HA, vision changes, or SOB.   Patient reports mild abdominal pain that is adequately relieved by oral pain medications. Lochia is mild and stable. Not yet ambulating. Lopez still in place. Infant in NICU. Believes swelling is improving.     Objective:      Temp:  [97.8 °F (36.6 °C)-98.5 °F (36.9 °C)] 98.1 °F (36.7 °C)  Pulse:  [] 104  Resp:  [16-20] 17  SpO2:  [93 %-98 %] 98 %  BP: ()/(44-88) 129/88    Physical Exam  A&Ox3, NAD  nonlabored breathing, no respiratory distress, LCTAB  Abd soft, mildly distended, mildly tender to palpation without rebound/guarding  Fundus not palpable  Symmetric lower extremity edema, SCDs on and functioning  Appropriate mood & affect    Lab Review  Recent Results (from the past 4 hour(s))   Comprehensive metabolic panel    Collection Time: 10/10/20  5:04 AM   Result Value Ref Range    Sodium 133 (L) 136 - 145 mmol/L    Potassium 4.4 3.5 - 5.1 mmol/L    Chloride 106 95 - 110 mmol/L    CO2 18 (L) 23 - 29 mmol/L    Glucose 102 70 - 110 mg/dL    BUN, Bld 21 (H) 6 - 20 mg/dL    Creatinine 0.8 0.5 - 1.4 mg/dL    Calcium 8.0 (L) 8.7 - 10.5 mg/dL    Total Protein 5.9 (L) 6.0 - 8.4 g/dL    Albumin 2.2 (L) 3.5 - 5.2 g/dL    Total Bilirubin 0.3 0.1 - 1.0 mg/dL    Alkaline Phosphatase 168 (H) 55 - 135 U/L    AST 33 10 - 40 U/L    ALT 17 10 - 44 U/L    Anion Gap 9 8 - 16 mmol/L    eGFR if African American >60 >60 mL/min/1.73 m^2    eGFR if non African American >60 >60 mL/min/1.73 m^2   Post Partum Type and Screen    Collection Time: 10/10/20  5:04 AM   Result Value Ref Range    Group & Rh A NEG     Indirect Armando POS    Cristel - [facility] (Fetal Bleed Screen)    Collection Time: 10/10/20  5:04 AM   Result Value Ref  Range    Cristel - [facility] (Fetal Bleed Screen) NEG      I/O    Intake/Output Summary (Last 24 hours) at 10/10/2020 0751  Last data filed at 10/10/2020 0606  Gross per 24 hour   Intake 180 ml   Output 2125 ml   Net -1945 ml     Assessment:     Patient Active Problem List   Diagnosis    Endometrioma of ovary    Itching    Hypothyroidism    Current maternal condition affecting pregnancy    Pre-eclampsia in third trimester    Fetal heart rate non-reassuring affecting management of mother    Cholestasis during pregnancy    Rh negative state in antepartum period    S/P  section     Plan:   1. Postpartum care:  - Patient doing well. Continue routine management and advances.  - Continue PO pain meds. Pain well controlled.  - Heme: H/h  >   - Encourage ambulation  - Contraception: defer to primary OBG  - Lactation consult PRN    2. preE w SF  - now s/p delivery and s/p MgSO4  - was on procardia 120XL, now has been discontinued 2/2 low/normal bps  - continue to monitor bps and UOP    3. Hypothyroidism  - continue home synthroid    4. Peritoneal ascites  - 1.5 L removed at time of   - cytology pending    5. Hyponatremia  - Na 128 yesterday, improved this morning at 133 without treatment\  - Expect normalization as patient diureses postpartum    Dispo: Continue inpatient care. Anticipate dc home POD#3-4.     Moses Berrios M.D., PGY4  Obstetrics & Gynecology   -----------------------------------------------------------  Patient seen and examined. Denies any shortness of breath, headaches, vision changes. Overall, feeling improved.    Temp:  [97.8 °F (36.6 °C)-98.5 °F (36.9 °C)] 98.1 °F (36.7 °C)  Pulse:  [] 104  Resp:  [16-20] 17  SpO2:  [93 %-98 %] 98 %  BP: ()/(44-88) 129/88    Bps remained normal to mild range. Will hold Procardia and continue to monitor.    Significant edema in upper and lower extremities.   Will give one time dose of Lasix 20 mg for symptomatic relief and monitor  urine output.  CMP within normal limits.    Continue inpatient management    Fernando Wild MD  PGY 5  Maternal Fetal Medicine  [facility]

## 2020-10-10 NOTE — CARE UPDATE
MD to bedside to evaluate swelling and SOB.     S: Patient reports she is feeling better, states she has some mild shortness of breath, though feels it is likely due to the way she is laying in the bed. States her swelling is overall improving. Pain well controlled with PO pain meds.     O:   Temp:  [97.8 °F (36.6 °C)-98.6 °F (37 °C)] 98.2 °F (36.8 °C)  Pulse:  [] 94  Resp:  [16-17] 17  SpO2:  [93 %-99 %] 95 %  BP: ()/(44-84) 133/62  General: NAD  Pulm: Crackles noted in LLL, RR 20   CV: Normal S1/S2, no MRG   Extremities: 3+ edema noted in b/l LE to knees    A/P:   -CXR to evaluate for pulmonary edema   -IS to prevent atelectasis   -Repeat CMP in AM   -UOP overall improving, last hour 200cc  -Monitor VS closely     Jane Chester MD   PGY-1, OB-GYN

## 2020-10-11 PROCEDURE — 99231 SBSQ HOSP IP/OBS SF/LOW 25: CPT | Mod: ,,, | Performed by: OBSTETRICS & GYNECOLOGY

## 2020-10-11 PROCEDURE — 11000001 HC ACUTE MED/SURG PRIVATE ROOM

## 2020-10-11 PROCEDURE — 25000003 PHARM REV CODE 250: Performed by: STUDENT IN AN ORGANIZED HEALTH CARE EDUCATION/TRAINING PROGRAM

## 2020-10-11 PROCEDURE — 63600175 PHARM REV CODE 636 W HCPCS: Performed by: STUDENT IN AN ORGANIZED HEALTH CARE EDUCATION/TRAINING PROGRAM

## 2020-10-11 PROCEDURE — 87075 CULTR BACTERIA EXCEPT BLOOD: CPT

## 2020-10-11 PROCEDURE — 87076 CULTURE ANAEROBE IDENT EACH: CPT

## 2020-10-11 PROCEDURE — 87070 CULTURE OTHR SPECIMN AEROBIC: CPT

## 2020-10-11 PROCEDURE — 99231 PR SUBSEQUENT HOSPITAL CARE,LEVL I: ICD-10-PCS | Mod: ,,, | Performed by: OBSTETRICS & GYNECOLOGY

## 2020-10-11 RX ORDER — LIDOCAINE HYDROCHLORIDE AND EPINEPHRINE 10; 10 MG/ML; UG/ML
5 INJECTION, SOLUTION INFILTRATION; PERINEURAL ONCE
Status: COMPLETED | OUTPATIENT
Start: 2020-10-11 | End: 2020-10-11

## 2020-10-11 RX ADMIN — LEVOTHYROXINE SODIUM 50 MCG: 25 TABLET ORAL at 06:10

## 2020-10-11 RX ADMIN — DOCUSATE SODIUM 200 MG: 100 CAPSULE, LIQUID FILLED ORAL at 07:10

## 2020-10-11 RX ADMIN — IBUPROFEN 600 MG: 600 TABLET ORAL at 11:10

## 2020-10-11 RX ADMIN — ENOXAPARIN SODIUM 40 MG: 40 INJECTION SUBCUTANEOUS at 06:10

## 2020-10-11 RX ADMIN — OXYCODONE HYDROCHLORIDE AND ACETAMINOPHEN 1 TABLET: 10; 325 TABLET ORAL at 06:10

## 2020-10-11 RX ADMIN — LIDOCAINE HYDROCHLORIDE,EPINEPHRINE BITARTRATE 1 ML: 10; .01 INJECTION, SOLUTION INFILTRATION; PERINEURAL at 02:10

## 2020-10-11 RX ADMIN — MUPIROCIN: 20 OINTMENT TOPICAL at 09:10

## 2020-10-11 RX ADMIN — OXYCODONE HYDROCHLORIDE AND ACETAMINOPHEN 1 TABLET: 10; 325 TABLET ORAL at 02:10

## 2020-10-11 RX ADMIN — DOCUSATE SODIUM 200 MG: 100 CAPSULE, LIQUID FILLED ORAL at 09:10

## 2020-10-11 RX ADMIN — IBUPROFEN 600 MG: 600 TABLET ORAL at 06:10

## 2020-10-11 RX ADMIN — IBUPROFEN 600 MG: 600 TABLET ORAL at 12:10

## 2020-10-11 RX ADMIN — MUPIROCIN: 20 OINTMENT TOPICAL at 07:10

## 2020-10-11 NOTE — PROGRESS NOTES
POSTPARTUM PROGRESS NOTE    Shanae Vo is a 35 y.o. female POD #2 status post Primary  section at 31w1d in a pregnancy complicated by preeclampsia, cholestasis, and hypothyroidism. Patient is doing well this morning. She denies nausea, vomiting, fever or chills. No HA, vision changes, or SOB.   Patient reports mild abdominal pain that is adequately relieved by oral pain medications. Lochia is mild and stable. Ambulating without difficulty. Voiding without difficulty. Infant in NICU. Believes swelling is improving.     Objective:      Temp:  [97.7 °F (36.5 °C)-98.2 °F (36.8 °C)] 97.8 °F (36.6 °C)  Pulse:  [] 83  Resp:  [16-18] 16  SpO2:  [97 %-98 %] 97 %  BP: (129-149)/(65-88) 139/65    Physical Exam  A&Ox3, NAD  nonlabored breathing, no respiratory distress, LCTAB  Abd soft, mildly distended, mildly tender to palpation without rebound/guarding  Fundus not palpable  Symmetric lower extremity edema, SCDs on and functioning  Appropriate mood & affect    Lab Review  No results found for this or any previous visit (from the past 4 hour(s)).  I/O    Intake/Output Summary (Last 24 hours) at 10/11/2020 0601  Last data filed at 10/10/2020 1900  Gross per 24 hour   Intake 1180 ml   Output 4600 ml   Net -3420 ml     Assessment:     Patient Active Problem List   Diagnosis    Endometrioma of ovary    Itching    Hypothyroidism    Current maternal condition affecting pregnancy    Pre-eclampsia in third trimester    Fetal heart rate non-reassuring affecting management of mother    Cholestasis during pregnancy    Rh negative state in antepartum period    S/P  section     Plan:   1. Postpartum care:  - Patient doing well. Continue routine management and advances.  - Continue PO pain meds. Pain well controlled.  - Heme: H/h 13/40 > 1339  - Encourage ambulation  - Contraception: OCPs  - Lactation consult PRN    2. preE w SF  - now s/p delivery and s/p MgSO4  - BP: (129-149)/(65-88) 139/65  - was  on procardia 120XL, now has been discontinued 2/2 low/normal bps  - continue to monitor bps and UOP  - weight: 98.3kg on 10/10    3. Hypothyroidism  - continue home synthroid    4. Peritoneal ascites  - 1.5 L removed at time of   - cytology pending    5. Lower extremity edema  - improved since prior exam  - s/p 20 mg lasix    6. Prepregnancy BMI >40  - meets criteria for postpartum lovenox  - lovenox 40 mg qD    As patient meets milestones, will plan to discharge on POD#3-4.    So Garcia MD/MPH  OB/GYN PGY1  --------------------------------  Patient seen and examined. POD #2 from PCD complicated by PreESF. Overall feeling well with no complaints.    Temp:  [97.7 °F (36.5 °C)-98.3 °F (36.8 °C)] 98.3 °F (36.8 °C)  Pulse:  [78-99] 86  Resp:  [16-18] 17  SpO2:  [97 %-98 %] 97 %  BP: (137-149)/(65-75) 146/75    Bps normal to mild range   Continue expectant management. Possible Discharge home 10/12-10/13    Fernando Wild MD  PGY 5  Maternal Fetal Medicine  [facility]

## 2020-10-11 NOTE — PROGRESS NOTES
"MD to bedside per patient request to evaluate "bump" on mons.     S: Patient reports this "bump" has been present since prior to delivery. Reports she forgot about it until this AM when she brushed against it and states it is painful. Reports she has prior history of similar bump in same spot that her primary doctor treated with Bactrim and mupirocin. States it recurred every 2 months for the past 6 months.       O:   Temp:  [97.7 °F (36.5 °C)-98.3 °F (36.8 °C)] 98.2 °F (36.8 °C)  Pulse:  [78-86] 85  Resp:  [16-18] 18  SpO2:  [97 %-98 %] 98 %  BP: (137-155)/(65-80) 155/80  : 3x1 cm mobile, tender area on the L mons, overlying redness and induration     Procedure:     Area cleaned with alcohol swab. 1 ml Lidocaine 1% without epinephrine injected in a clockwise fashion around the lesion. Adequate anesthesia established and induration incised with stab incision using 10-blade. Area gently squeezed at the edges with expression of moderate amount of pus. Culture swab obtained from pus. Continued to express pus. Area still felt indurated, so additional pressure applied at the edges until a capsule was expressed, indicating that this was likely a cystic structure. Capsule removed in pieces, area squeezed again which revealed no underlying erythema indicating likely removal of capsule. Area cleaned with 20 cc NS. No dressing applied to allow continual drainage.      A/P:   -Will reassess wound in the AM  -Bactrim x 7 days for wound infection prophylaxis   -Advised to keep the area clean and dry and avoid its contact with  scar     Jane Chester MD   PGY-1, OB-GYN      "

## 2020-10-11 NOTE — PROGRESS NOTES
"Dr. Saucedo and Dr. Chester to bedside to assess/drain "bump" located on pt's mons. "Bump" was drained and cultured at this time. Pt states that she is feeling better now that it has been drained. Orders to leave incision open to air and to continue to monitor.   "

## 2020-10-11 NOTE — PLAN OF CARE
Discussed with Pt plan of care for the night to include adequate pain management. Talked with Pt about when to call the nurse regarding pain medication and bleeding.

## 2020-10-11 NOTE — PLAN OF CARE
"Lactation note:  To room to offer assistance with breast pump use/care or questions. The patient's partner says they have been trying to play "catch up" with pumping. They are pumping every few hours and getting drops. They have not been as successful as yesterday. Encouragement provided and discussed that drops are normal for today. Offered assistance at next pumping session. Mom denies any needs at this time. Bottle and labels at bedside.  phone number on board.  "

## 2020-10-12 ENCOUNTER — ANESTHESIA EVENT (OUTPATIENT)
Dept: OBSTETRICS AND GYNECOLOGY | Facility: OTHER | Age: 35
End: 2020-10-12
Payer: OTHER GOVERNMENT

## 2020-10-12 LAB — BACTERIA SPEC AEROBE CULT: NORMAL

## 2020-10-12 PROCEDURE — 25000003 PHARM REV CODE 250: Performed by: STUDENT IN AN ORGANIZED HEALTH CARE EDUCATION/TRAINING PROGRAM

## 2020-10-12 PROCEDURE — 63600175 PHARM REV CODE 636 W HCPCS: Performed by: STUDENT IN AN ORGANIZED HEALTH CARE EDUCATION/TRAINING PROGRAM

## 2020-10-12 PROCEDURE — 11000001 HC ACUTE MED/SURG PRIVATE ROOM

## 2020-10-12 PROCEDURE — 36000 PLACE NEEDLE IN VEIN: CPT | Performed by: STUDENT IN AN ORGANIZED HEALTH CARE EDUCATION/TRAINING PROGRAM

## 2020-10-12 PROCEDURE — 99233 PR SUBSEQUENT HOSPITAL CARE,LEVL III: ICD-10-PCS | Mod: 24,,, | Performed by: OBSTETRICS & GYNECOLOGY

## 2020-10-12 PROCEDURE — 99233 SBSQ HOSP IP/OBS HIGH 50: CPT | Mod: 24,,, | Performed by: OBSTETRICS & GYNECOLOGY

## 2020-10-12 RX ORDER — BACITRACIN 500 [USP'U]/G
OINTMENT TOPICAL 2 TIMES DAILY
Status: DISCONTINUED | OUTPATIENT
Start: 2020-10-12 | End: 2020-10-15 | Stop reason: HOSPADM

## 2020-10-12 RX ORDER — NIFEDIPINE 10 MG/1
20 CAPSULE ORAL ONCE
Status: COMPLETED | OUTPATIENT
Start: 2020-10-12 | End: 2020-10-12

## 2020-10-12 RX ORDER — BACITRACIN 500 [USP'U]/G
OINTMENT TOPICAL 2 TIMES DAILY
Status: DISCONTINUED | OUTPATIENT
Start: 2020-10-12 | End: 2020-10-12

## 2020-10-12 RX ORDER — NIFEDIPINE 10 MG/1
10 CAPSULE ORAL ONCE
Status: COMPLETED | OUTPATIENT
Start: 2020-10-12 | End: 2020-10-12

## 2020-10-12 RX ORDER — NIFEDIPINE 30 MG/1
60 TABLET, EXTENDED RELEASE ORAL DAILY
Status: DISCONTINUED | OUTPATIENT
Start: 2020-10-13 | End: 2020-10-13

## 2020-10-12 RX ORDER — NIFEDIPINE 30 MG/1
30 TABLET, EXTENDED RELEASE ORAL ONCE
Status: COMPLETED | OUTPATIENT
Start: 2020-10-12 | End: 2020-10-12

## 2020-10-12 RX ORDER — CEPHALEXIN 500 MG/1
500 CAPSULE ORAL EVERY 6 HOURS
Status: DISCONTINUED | OUTPATIENT
Start: 2020-10-12 | End: 2020-10-15 | Stop reason: HOSPADM

## 2020-10-12 RX ORDER — NIFEDIPINE 30 MG/1
30 TABLET, EXTENDED RELEASE ORAL DAILY
Status: DISCONTINUED | OUTPATIENT
Start: 2020-10-12 | End: 2020-10-12

## 2020-10-12 RX ADMIN — CEPHALEXIN 500 MG: 500 CAPSULE ORAL at 05:10

## 2020-10-12 RX ADMIN — IBUPROFEN 600 MG: 600 TABLET ORAL at 11:10

## 2020-10-12 RX ADMIN — OXYCODONE HYDROCHLORIDE AND ACETAMINOPHEN 1 TABLET: 10; 325 TABLET ORAL at 12:10

## 2020-10-12 RX ADMIN — DOCUSATE SODIUM 200 MG: 100 CAPSULE, LIQUID FILLED ORAL at 09:10

## 2020-10-12 RX ADMIN — OXYCODONE HYDROCHLORIDE AND ACETAMINOPHEN 1 TABLET: 10; 325 TABLET ORAL at 11:10

## 2020-10-12 RX ADMIN — DOCUSATE SODIUM 200 MG: 100 CAPSULE, LIQUID FILLED ORAL at 08:10

## 2020-10-12 RX ADMIN — NIFEDIPINE 20 MG: 10 CAPSULE ORAL at 12:10

## 2020-10-12 RX ADMIN — MUPIROCIN: 20 OINTMENT TOPICAL at 09:10

## 2020-10-12 RX ADMIN — IBUPROFEN 600 MG: 600 TABLET ORAL at 05:10

## 2020-10-12 RX ADMIN — Medication: at 11:10

## 2020-10-12 RX ADMIN — MUPIROCIN: 20 OINTMENT TOPICAL at 08:10

## 2020-10-12 RX ADMIN — CEPHALEXIN 500 MG: 500 CAPSULE ORAL at 11:10

## 2020-10-12 RX ADMIN — NIFEDIPINE 30 MG: 30 TABLET, FILM COATED, EXTENDED RELEASE ORAL at 04:10

## 2020-10-12 RX ADMIN — Medication: at 09:10

## 2020-10-12 RX ADMIN — NIFEDIPINE 30 MG: 30 TABLET, FILM COATED, EXTENDED RELEASE ORAL at 08:10

## 2020-10-12 RX ADMIN — ENOXAPARIN SODIUM 40 MG: 40 INJECTION SUBCUTANEOUS at 05:10

## 2020-10-12 RX ADMIN — NIFEDIPINE 10 MG: 10 CAPSULE ORAL at 12:10

## 2020-10-12 RX ADMIN — LEVOTHYROXINE SODIUM 50 MCG: 25 TABLET ORAL at 05:10

## 2020-10-12 NOTE — PLAN OF CARE
Patient safety maintained, side rails up, bed low and locked position. Pt ambulating and voiding independently.  Pain well controlled with PRN pain medication. Fundus midline, firm, with moderate  lochia. Incision site CHICO; incision clean, dry, and intact.  Significant other at bedside and assisting in patient's care. Will continue to monitor.

## 2020-10-12 NOTE — ANESTHESIA PROCEDURE NOTES
Peripheral IV Insertion    Diagnosis: I87.9 Disorder of vein, unspecified.    Patient location during procedure: floor    Staffing  Authorizing Provider: Riley Perez MD  Performing Provider: Riley Perez MD      Preanesthetic Checklist  Completed: patient identified, site marked, surgical consent, pre-op evaluation, timeout performed, IV checked, risks and benefits discussed, monitors and equipment checked and anesthesia consent givenPeripheral IV Insertion  Skin Prep: chlorhexidine gluconate and isopropyl alcohol  Local Infiltration: none  Orientation: left  Location: forearm  Catheter Size: 20 G  Catheter placement by Ultrasound guidance. Heme positive aspiration all ports.  Vessel Caliber: medium, patent, compressibility normalInsertion Attempts: 2  Assessment  Dressing: secured with tape and tegaderm  Patient: Tolerated well  Line flushed easily.

## 2020-10-12 NOTE — PROGRESS NOTES
RN entered room for 20 min BP recheck. Anesthesia at bedside placing IV. Patient visibly upset and tearful and asked not to have BP checked at this time. Dr. Tsang notified. Ok to wait 10 minutes and recheck.     0141- notified Dr. Tsang of repeat /85, ; patient still tearful in bed. OK to continue with q4 VS.

## 2020-10-12 NOTE — PROGRESS NOTES
"POSTPARTUM PROGRESS NOTE    Shanae Vo is a 35 y.o. female POD #3 status post Primary  section at 31w1d in a pregnancy complicated by preeclampsia, cholestasis, and hypothyroidism.   Overnight, pt had elevated blood pressures which required 2 pushes of 10 and 20 of procardia. Pt is anxious this morning, as she feels that she is "slipping" with her progress. Believes that she is taking too much medicine. States that she has been shaky and has periods of feeling anxious and crying. She states that the bump on her mons is less painful; she has not noticed any drainage. She denies nausea, vomiting, fever/chills, HA, vision changes, SOB. Endorses edema that has improved since yesterday.   Patient reports mild abdominal pain that is adequately relieved by oral pain medications. Lochia is mild and stable. Ambulating without difficulty. Voiding without difficulty. Infant in NICU. Desires OCPs for contraception.    Objective:      Temp:  [98 °F (36.7 °C)-98.5 °F (36.9 °C)] 98.1 °F (36.7 °C)  Pulse:  [] 86  Resp:  [17-18] 18  SpO2:  [96 %-99 %] 99 %  BP: (141-165)/(72-85) 157/75    Physical Exam  A&Ox3, NAD  nonlabored breathing, no respiratory distress  Abd soft, mildly distended, mildly tender to palpation without rebound/guarding]  Incision clean, dry, intact  Fundus not palpable  1.5 cm area of induration to left side of mons, no active drainage, nontender  Symmetric lower extremity edema, SCDs on and functioning  Tearful mood    Lab Review  No results found for this or any previous visit (from the past 4 hour(s)).  I/O    Intake/Output Summary (Last 24 hours) at 10/12/2020 0809  Last data filed at 10/12/2020 0454  Gross per 24 hour   Intake --   Output 4100 ml   Net -4100 ml     Assessment:     Patient Active Problem List   Diagnosis    Endometrioma of ovary    Itching    Hypothyroidism    Current maternal condition affecting pregnancy    Pre-eclampsia in third trimester    Fetal heart rate " non-reassuring affecting management of mother    Cholestasis during pregnancy    Rh negative state in antepartum period    S/P  section     Plan:   1. Postpartum care:  - Patient doing well. Continue routine management and advances.  - Continue PO pain meds. Pain well controlled.  - Heme: H/h  >   - Encourage ambulation  - Contraception: OCPs  - Lactation consult PRN    2. preE w SF  - now s/p delivery and s/p MgSO4  - BP: (141-165)/(72-85) 157/75  - elevated blood pressures overnight, resolved after procardia 10/20 push  - start procardia XL 30 mg qD  - UOP: 4100 mL in last 24 hours  - continue to monitor bps and UOP  - weight: 96.5kg on 10/12    3. Hypothyroidism  - continue home synthroid    4. Peritoneal ascites  - 1.5 L removed at time of   - cytology pending    5. Lower extremity edema  - improved since prior exam  - s/p 20 mg lasix    6. Prepregnancy BMI >40  - meets criteria for postpartum lovenox  - lovenox 40 mg qD    7. Anxiety  - Pt counseled on options for speaking to , social work and declines  - Pt declines wanting to start medication  - Will require 1-2 post partum mood check    As patient meets milestones, will plan to discharge on POD#3-4.    So Garcia MD/MPH  OB/GYN PGY1

## 2020-10-12 NOTE — PROGRESS NOTES
Notified Dr. Tsang of reddened and hard area below the cyst area that was drained today. MD states to continue to monitor and notify if worsens. MD also notified that note from today states patient is to be started on antibiotics however no orders noted. MD states will place orders.     2125- per MD, no antibiotics to be ordered at this time.

## 2020-10-12 NOTE — PROGRESS NOTES
"Patient called out stating she was "shaking" and would like her BP checked. Upon entering room, patient stated she went to the restroom and started to shake on her way back to the bed. Patient seen laying under the covers in bed, no visible shaking noted by RN. VS obtained, see flowsheet. RN asked patient if she felt like her anxiety was high or if she felt like she was having an anxiety attack; patient denied. RN called Dr. Tsang to notify of increased HR and patient's complaint of shaking. MD states shaking could be from procardia given earlier in the night and an MD will assess on AM rounds.   "

## 2020-10-12 NOTE — PLAN OF CARE
COPIED FROM INFANT'S CHART    SOCIAL WORK DISCHARGE PLANNING ASSESSMENT     Sw completed discharge planning assessment with pt's mother in mother's room 600.  Pt's mother was easily engaged. Education on the role of  was provided. Emotional support provided throughout assessment.        Legal Name: Ti Vo                      :  10/9/2020         Patient Active Problem List   Diagnosis      infant of 31 completed weeks of gestation    Acute respiratory distress in  with surfactant disorder    At risk for sepsis in     Hypospadias, penile            [facility]           KRISTINA: [birth year]     Birth Weight:   1.2 kg (2 lb 10.3 oz)                Birth Length: 41.0cm               Gestational Age: 31w1d           Apgars    Living status:            Apgars:  1 min.:  5 min.:  10 min.:  15 min.:  20 min.:     Skin color:               Heart rate:               Reflex irritability:               Muscle tone:               Respiratory effort:               Total:                         Mother: Shanae Vo   Address: [address] [place] [address]  Phone: [phone]  Employer: None                       Job Title: n/a  Education: some college     Father: Jony Vo  Address: same as mom  Phone: [phone]  Employer: [place]   Job Title: [employer]   Education:  some college  Signed Birth Certificate: Yes; parents are      Support person(s): Brittaney Ha (pgm) [phone]     Sibling(s): none     Commercial Insurance Coverage: Yes  Mother's [facility] (formerly [place] Medicaid): Primary: No Secondary: No       Pediatrician: Prefers [facility].  List provided.  Mom to select MD and inform bedside RN       Nutrition: Expressed Breast Milk               Breast Pump:              Yes               Has obtained a pump               WIC:              N/A        Essential Items: (includes car seat,  crib/bassinet/pack-n-play, clothing, bottles, diapers, etc.)  Plans to acquire by discharge      Transportation: Personal vehicle      Education: Information given on NICU Education Classes; Physician/NNP daily rounds; and Postpartum Depression signs.      Resources Given: [facility] [employer], [employer] [place] Insurance plan, Medicaid transportation, Immunizations, Glossary of Commonly Used Terms, SSI Benefits, Preparing for Your Baby's Discharge Home, Support Resources for NICU Families, Insurance Coverage of Breast Pumps and Supplies, Breast Pumps through [employer] [place] insurance plan, WIC, Early Steps, Postpartum Depression, My Preemie Kip, My NICU Baby Kip, and [facility].        Potential Eligibility for SSI Benefits: Yes. Sw to provide diagnosis letter for application process.     Potential Discharge Needs:  Early Steps         [facility]  NICU   Phone [phone] Ext. [phone]  [email or web address]

## 2020-10-12 NOTE — PHYSICIAN QUERY
PT Name: Shanae Vo  MR #: [id number]     Perfusion Diagnosis Clarification     CDS/: NIKOS Bolanos,RNC-MNN         Contact information:[email or web address]  This form is a permanent document in the medical record.     Query Date: 2020    By submitting this query, we are merely seeking further clarification of documentation.  Please utilize your independent clinical judgment when addressing the question(s) below.  The Medical Record contains the following:  Indicators Supporting Clinical Findings Location in Medical Record   X Acute Illness (e.g. AMI, Sepsis, etc.) Suspect mixed etiology of shock, vasodilatory 2/2 CCB and hypovolemia from blood loss and ascites loss during  today. Anesthesiology care update 10/9   X Vital Signs  SBP 80s, MAP < 65 mmHg Anesthesiology care update 10/9    Acidosis documented     X ABGs/Labs Hgb=12.6-->13.6-->11.2-->13.3  Hct=37.5-->40.6-->33.9-->39.7 LAB 10/4-10/9   X Hypotension or Low Blood Pressure documented Called by nursing on mother baby unit that patient with symptomatic hypotension Anesthesiology care update 10/9    Altered Mental Status or Confusion      Diaphoresis, Cold Extremities or Cyanosis      Oliguria     X Medication/Treatment:  -Vasopressors  -Inotropic Drugs  -IV Fluids   -IV Antibiotics  -Cardiac Assist Devices  -Hemodynamic Monitoring  -Blood/Blood Products rsing instructed to start a IVF bolus and place patient in trendelenburg. Dr. Camejo and Dr. Munson at bedside to examine patient, phenylephrine boluses given (total 400mcg), and  5% albumin ordered. A second IV (midline) was placed in the LUE. On chart review, patient was given Procardia-XL in recovery Anesthesiology care update 10/9    Other       Provider, please specify diagnosis or diagnoses associated with above clinical findings.  [    ] Hemorrhagic Shock   [    ] Hypovolemic Shock   [    ] Other Shock (please specify): __________   [    ] Shock,  Unspecified   [  x  ] Other Condition (please specify): ____Hypovolemia due to third spacing of fluid into intrabdominal cavity.  A large amount of ascites pulled off at time of surgery, likely leading to increased 3rd spacing from vasculature, leading to hypovolemia.  Hypotension not thought to be 2/2 blood loss._____   [  ] Clinically Undetermined     Present on admission (POA) status:   [   ] Yes (Y)                [  ] Clinically Undetermined (W)           [   ] No (N)                  [   ] Documentation insufficient to determine if condition is POA (U)   Please document in your progress notes daily for the duration of treatment until resolved and include in your discharge summary.

## 2020-10-12 NOTE — PROGRESS NOTES
10/11/20 2345 10/12/20 0008   Vital Signs   BP (!) 160/83 (!) 163/83     Notified Dr. Tsang of above Keshav. MD to place order for procardia.

## 2020-10-12 NOTE — PROGRESS NOTES
10/12/20 0045   Vital Signs   Pulse 82   BP (!) 165/78     Notified Dr. Tsang of 20 minute post procardia BP. MD states will place another order for procardia. RN called anesthesia to place PIV to have if needed since patient no longer has IV access. MD states will come place new IV.

## 2020-10-12 NOTE — LACTATION NOTE
10/12/20 1000   Maternal Assessment   Breast Shape Bilateral:;angled;wide   Breast Density Bilateral:;soft   Areola Bilateral:;elastic   Nipples Bilateral:;everted   Maternal Infant Feeding   Maternal Emotional State assist needed;anxious   Equipment Type   Breast Pump Type double electric, hospital grade   Breast Pump Flange Type hard   Breast Pump Flange Size 24 mm   Breast Pumping   Breast Pumping Interventions early pumping promoted;frequent pumping encouraged   Breast Pumping double electric breast pump utilized;pre-pumping breast massage;pre-pumping hand expression;pre-pumping tactile stimulation;bilateral breasts pumped until soft   Lactation Referrals   Lactation Referrals other (see comments)  (lactation warmline)   Basic breast pump education reviewed. LC assisted with pumping session, mother applied warm compresses before pumping on initiation setting. Pt is increasing pumpings to 6-7x/day, encouraged new day, new goal to reach 8 times in 24hrs. Few mL expressed each breast, then LC taught hand expression after pumping. FOB has been hand expressing. Mom needs to practice as she feels better.     Mother was taught hand expression of breastmilk/colostrum. She was instructed to:   Sit upright and lean forward, if possible.   When feasible, apply warm, wet compress over breasts for a few minutes.    Perform gentle breast massage.   Form a C with her hand and place it about 1 inch back from the areola with the nipple centered between her index finger and her thumb.   Press, compress, relax:  Using her finger and thumb, apply pressure in an inward direction toward the breast without stretching the tissue, compress the breast tissue between her finger and thumb, then relax her finger and thumb. Repeat process for a few minutes.   Rotate placement of finger and thumb on the breasts to facilitate emptying.   Collect expressed breastmilk/colostrum with a spoon or cup and feed immediately to the baby,  if able.   If unable to feed immediately, place breastmilk/colostrum directly into a sterile storage container for later use. Place the babys breast milk label (with the date and time of collection and the names of mother's medications) on the container. Reviewed proper handling and storage of expressed breastmilk.   Patient effectively return demonstrated and verbalized understanding.      More bottle provided , EBM labeled and placed in fridge, Pt to notify  RN when ready to go to NICU and take EBM. Pt has medela pump for home and plans to bring to room for LC demonstration. Long discussion about [place] pump and recommendation not to use hands free pump as a primary pump for exclusively pumping at this time.

## 2020-10-13 PROCEDURE — 25000003 PHARM REV CODE 250: Performed by: STUDENT IN AN ORGANIZED HEALTH CARE EDUCATION/TRAINING PROGRAM

## 2020-10-13 PROCEDURE — 99232 SBSQ HOSP IP/OBS MODERATE 35: CPT | Mod: 24,,, | Performed by: OBSTETRICS & GYNECOLOGY

## 2020-10-13 PROCEDURE — 63600175 PHARM REV CODE 636 W HCPCS: Performed by: STUDENT IN AN ORGANIZED HEALTH CARE EDUCATION/TRAINING PROGRAM

## 2020-10-13 PROCEDURE — 99232 PR SUBSEQUENT HOSPITAL CARE,LEVL II: ICD-10-PCS | Mod: 24,,, | Performed by: OBSTETRICS & GYNECOLOGY

## 2020-10-13 PROCEDURE — 11000001 HC ACUTE MED/SURG PRIVATE ROOM

## 2020-10-13 RX ORDER — OXYCODONE AND ACETAMINOPHEN 5; 325 MG/1; MG/1
1 TABLET ORAL EVERY 4 HOURS PRN
Qty: 18 TABLET | Refills: 0 | Status: SHIPPED | OUTPATIENT
Start: 2020-10-13 | End: 2020-11-27

## 2020-10-13 RX ORDER — NIFEDIPINE 30 MG/1
30 TABLET, EXTENDED RELEASE ORAL ONCE
Status: COMPLETED | OUTPATIENT
Start: 2020-10-13 | End: 2020-10-13

## 2020-10-13 RX ORDER — CEPHALEXIN 500 MG/1
500 CAPSULE ORAL EVERY 6 HOURS
Qty: 20 CAPSULE | Refills: 0 | Status: SHIPPED | OUTPATIENT
Start: 2020-10-13 | End: 2020-10-18

## 2020-10-13 RX ORDER — CEPHALEXIN 500 MG/1
500 CAPSULE ORAL EVERY 6 HOURS
Qty: 12 CAPSULE | Refills: 0 | Status: SHIPPED | OUTPATIENT
Start: 2020-10-13 | End: 2020-10-13

## 2020-10-13 RX ORDER — IBUPROFEN 600 MG/1
600 TABLET ORAL EVERY 6 HOURS
Qty: 30 TABLET | Refills: 1 | Status: SHIPPED | OUTPATIENT
Start: 2020-10-13 | End: 2020-11-27

## 2020-10-13 RX ORDER — BACITRACIN 500 [USP'U]/G
OINTMENT TOPICAL 2 TIMES DAILY
Refills: 0 | COMMUNITY
Start: 2020-10-13 | End: 2021-02-18

## 2020-10-13 RX ORDER — LABETALOL HYDROCHLORIDE 5 MG/ML
20 INJECTION, SOLUTION INTRAVENOUS ONCE
Status: COMPLETED | OUTPATIENT
Start: 2020-10-13 | End: 2020-10-13

## 2020-10-13 RX ORDER — NIFEDIPINE 30 MG/1
90 TABLET, EXTENDED RELEASE ORAL DAILY
Status: DISCONTINUED | OUTPATIENT
Start: 2020-10-14 | End: 2020-10-15 | Stop reason: HOSPADM

## 2020-10-13 RX ORDER — NIFEDIPINE 60 MG/1
60 TABLET, EXTENDED RELEASE ORAL DAILY
Qty: 30 TABLET | Refills: 11 | Status: SHIPPED | OUTPATIENT
Start: 2020-10-14 | End: 2020-10-15 | Stop reason: HOSPADM

## 2020-10-13 RX ADMIN — CEPHALEXIN 500 MG: 500 CAPSULE ORAL at 05:10

## 2020-10-13 RX ADMIN — Medication: at 09:10

## 2020-10-13 RX ADMIN — ENOXAPARIN SODIUM 40 MG: 40 INJECTION SUBCUTANEOUS at 04:10

## 2020-10-13 RX ADMIN — CEPHALEXIN 500 MG: 500 CAPSULE ORAL at 06:10

## 2020-10-13 RX ADMIN — IBUPROFEN 600 MG: 600 TABLET ORAL at 06:10

## 2020-10-13 RX ADMIN — OXYCODONE HYDROCHLORIDE AND ACETAMINOPHEN 1 TABLET: 10; 325 TABLET ORAL at 08:10

## 2020-10-13 RX ADMIN — DOCUSATE SODIUM 200 MG: 100 CAPSULE, LIQUID FILLED ORAL at 08:10

## 2020-10-13 RX ADMIN — IBUPROFEN 600 MG: 600 TABLET ORAL at 05:10

## 2020-10-13 RX ADMIN — NIFEDIPINE 60 MG: 30 TABLET, FILM COATED, EXTENDED RELEASE ORAL at 08:10

## 2020-10-13 RX ADMIN — MUPIROCIN: 20 OINTMENT TOPICAL at 09:10

## 2020-10-13 RX ADMIN — MUPIROCIN: 20 OINTMENT TOPICAL at 08:10

## 2020-10-13 RX ADMIN — CEPHALEXIN 500 MG: 500 CAPSULE ORAL at 12:10

## 2020-10-13 RX ADMIN — NIFEDIPINE 30 MG: 30 TABLET, FILM COATED, EXTENDED RELEASE ORAL at 02:10

## 2020-10-13 RX ADMIN — LABETALOL HYDROCHLORIDE 20 MG: 5 INJECTION, SOLUTION INTRAVENOUS at 01:10

## 2020-10-13 RX ADMIN — LEVOTHYROXINE SODIUM 50 MCG: 25 TABLET ORAL at 05:10

## 2020-10-13 RX ADMIN — Medication: at 08:10

## 2020-10-13 RX ADMIN — IBUPROFEN 600 MG: 600 TABLET ORAL at 12:10

## 2020-10-13 RX ADMIN — DOCUSATE SODIUM 200 MG: 100 CAPSULE, LIQUID FILLED ORAL at 09:10

## 2020-10-13 NOTE — DISCHARGE SUMMARY
Delivery Discharge Summary  Obstetrics      Primary OB Clinician: Anne Lyons MD      Admission date: 2020  Discharge date: 10/15/2020    Disposition: To home, self care    Discharge Diagnosis List:      Patient Active Problem List   Diagnosis    Endometrioma of ovary    Itching    Hypothyroidism    Current maternal condition affecting pregnancy    Pre-eclampsia in third trimester    Fetal heart rate non-reassuring affecting management of mother    Cholestasis during pregnancy    Rh negative state in antepartum period    S/P  section       Procedure: , due to Cat 2 tracing    Hospital Course:  Shanae Vo is a 35 y.o. now , POD #4 who was admitted on 2020 at 29w5d for PreEclampsia, Cholestasis of pregnancy, and a Cat 2 tracing. . Patient was subsequently admitted to labor and delivery unit with signed consents.     Hospital Course:   2020: Admit to antepartum service for serial blood pressure monitoring, continuous fetal heart tracing, betamethasone course. Consents for delivery and blood transfusion signed. Ultrasound vertex position. Spiked severe range blood pressure after admission and was given labetalol IV 20mg x1 with appropriate resolution. Remained mild range throughout the night, plan to start labetalol in AM  10/01/2020: Patient tearful about situation this morning, but denies any current complaints or s/s of pre-e other than increased swelling.  10/02/2020: Patient complaining of painful swelling this morning, and states that she thinks the magnesium is making her feel nauseated. Has a slight headache, but denies wanting to try medications for it. Denies other s/s of pre-e. Has had intermittent hypotension episodes throughout the night after the labetalol 200mg given. Will change PO medications to Procardia. No severe range pressures noted. Will turn magnesium off today and advance diet to regular.   10/03/2020: VSS throughout the night.  Denies s/s of pre-e today. Had more episodes of anxiety throughout the night attributed to her leg swelling. Declined long term anxiolytic at this time, ativan PRN given. NST reactive and reassuring.   10/04/2020 VSS. Episode of anxiety/panic overnight. Otherwise has remained clinically stable  10/05/2020: One severe range pressure overnight, otherwise mild range. Denies any s/s of PreE today. Continues to experience severe anxiety with respect to her swelling. Trazadone has been added PRN but patient declined last night. NST reactive and reassuring. Will titrate Procardia 30XL to 60XL today. Labetalol 20IV given x1 for sustained severe range  10/06/2020: Severe range pressures sustained early this am. Labetalol 20 IV given. F/U growth ultrasound performed yesterday- read pending. Denies s/s of pre-e other than swelling that has not increased since yesterday. Procardia increased by 30mg to start 90XL in am.  10/07/2020: 1 set of sustained severe range blood pressures overnight requiring IV labetalol 20mg. Increased to procardia 90XL today. Slight headache this morning, mostly relieved with tylenol. Otherwise denies any new complaints.   10/08/2020: overnight patient with intermittent severe range and mild range blood pressures. Had 1 set of sustained requiring labetalol 20mg. Also endorsing 6/10 HA not relieved with compazine yesterday and somewhat relieved with tylenol. Made NPO overnight due to blood pressure elevations. Repeat CBC/CMP collected. Continuous monitoring. Given continuously elevated blood pressures and headache not relieved by pain medications, decision made to induce.     10/9/2020: Patient delivered by pLTCS for non-reassuring fetal heart rate tracing during induction. Hypotensive episodes seen after delivery so blood pressure medication held. Ascites noted - removed during c/s and sent for cytology.   10/10/2020: Bps remained normal to mild range. Will hold Procardia and continue to  monitor.Significant edema in upper and lower extremities.   Will give one time dose of Lasix 20 mg for symptomatic relief and monitor urine output.CMP within normal limits.Continue inpatient management  10/11/2020: Bps normal to mild range   Continue expectant management. Possible Discharge home 10/12-10/13  10/12/2020 : Bedside I&D performed for small cystic abscess on mons pubis. Bactrim x 7 days prescribed.   10/12/2020: Procardia 30XL started as patient was having uptrending. Continued to have elevated pressures in the afternoon so additional 30XL started. No more episodes of hypotension. Diuresing well.   10/13/2020: Procardia 60XL to start this morning. Another 2 severe range blood pressures requiring IV Labetalol. Will give an additional 30XL now. Will increase Procardia to 90XL.   10/14/2020: continues to have blood pressures in the high 150s systolic. Decision made to add on Labetalol 200 BID.   10/15/2020: Blood pressures stable. Patient asymptomatic. Stable for d/c.       On discharge day, patient's pain is controlled with oral pain medications. Pt is tolerating ambulation without SOB or CP, and regular diet without N/V. Reports lochia is mild. Denies any HA, vision changes, F/C, LE swelling. Denies any breast pain/soreness.    Pt in stable condition and ready for discharge. She has been instructed to start and/or continue medications and follow up with her obstetrics provider as listed below.    Pertinent studies:  CBC  Recent Labs   Lab 10/09/20  0606 10/09/20  1107   WBC 14.80* 18.02*   HGB 11.2* 13.3   HCT 33.9* 39.7   MCV 88 88    296          Immunization History   Administered Date(s) Administered    Rho (D) Immune Globulin - IM 10/01/2020, 10/10/2020    Tdap 10/04/2020        Delivery:    Episiotomy:     Lacerations:     Repair suture:     Repair # of packets:     Blood loss (ml):       Birth information:  YOB: 2021   Time of birth: 5:53 AM   Sex: male   Delivery type:  , Low Transverse   Gestational Age: 31w1d    Delivery Clinician:      Other providers:       Additional  information:  Forceps:    Vacuum:    Breech:    Observed anomalies      Living?:           APGARS  One minute Five minutes Ten minutes   Skin color:         Heart rate:         Grimace:         Muscle tone:         Breathing:         Totals:           Placenta: Delivered:       appearance      Patient Instructions:   Current Discharge Medication List      START taking these medications    Details   bacitracin 500 unit/gram ointment Apply topically 2 (two) times daily.  Refills: 0      cephALEXin (KEFLEX) 500 MG capsule Take 1 capsule (500 mg total) by mouth every 6 (six) hours. for 5 days  Qty: 20 capsule, Refills: 0      ibuprofen (ADVIL,MOTRIN) 600 MG tablet Take 1 tablet (600 mg total) by mouth every 6 (six) hours.  Qty: 30 tablet, Refills: 1      labetaloL (NORMODYNE) 200 MG tablet Take 1 tablet (200 mg total) by mouth every 12 (twelve) hours.  Qty: 60 tablet, Refills: 11    Comments: .      NIFEdipine (PROCARDIA-XL) 90 MG (OSM) 24 hr tablet Take 1 tablet (90 mg total) by mouth once daily.  Qty: 30 tablet, Refills: 11    Comments: .      oxyCODONE-acetaminophen (PERCOCET) 5-325 mg per tablet Take 1 tablet by mouth every 4 (four) hours as needed.  Qty: 18 tablet, Refills: 0    Comments: Quantity prescribed more than 7 day supply? No         CONTINUE these medications which have NOT CHANGED    Details   butalbital-acetaminophen-caffeine -40 mg (FIORICET, ESGIC) -40 mg per tablet Take 1 tablet by mouth every 6 (six) hours as needed for Headaches.  Qty: 30 tablet, Refills: 0      levothyroxine (TIROSINT) 50 mcg Cap Take 1 tablet by mouth once daily.      prenatal vit-iron fum-folic ac (RIGHT STEP PRENATAL VITAMINS) 27 mg iron- 0.8 mg Tab Take 1 tablet by mouth once daily.                    Fariba Gutierrez M.D.  [facility] PGY2

## 2020-10-13 NOTE — PROGRESS NOTES
POSTPARTUM PROGRESS NOTE    Shanae Vo is a 35 y.o. female POD #4 status post Primary  section at 31w1d in a pregnancy complicated by preeclampsia, cholestasis, and hypothyroidism.   Pt had elevated blood pressures which did not require treatment with IV medication. Pt is asymptomatic and feels significantly better this morning.   She denies nausea, vomiting, fever/chills, HA, vision changes, SOB. Endorses edema that has improved since yesterday. States that area on her   Patient reports mild abdominal pain that is adequately relieved by oral pain medications. Lochia is mild and stable. Ambulating without difficulty. Voiding without difficulty. Infant in NICU. Desires OCPs for contraception.    Objective:      Temp:  [98.1 °F (36.7 °C)-98.6 °F (37 °C)] 98.1 °F (36.7 °C)  Pulse:  [86-94] 88  Resp:  [18-19] 18  SpO2:  [95 %-99 %] 98 %  BP: (144-159)/(72-85) 147/80    Physical Exam  A&Ox3, NAD  nonlabored breathing, no respiratory distress  Abd soft, mildly distended, mildly tender to palpation without rebound/guarding]  Incision clean, dry, intact  Fundus not palpable  1 cm area of induration to left side of mons, no active drainage, nontender  Symmetric lower extremity edema, SCDs on and functioning    Lab Review  No results found for this or any previous visit (from the past 4 hour(s)).  I/O    Intake/Output Summary (Last 24 hours) at 10/13/2020 0721  Last data filed at 10/13/2020 0010  Gross per 24 hour   Intake 5500 ml   Output 6200 ml   Net -700 ml     Assessment:     Patient Active Problem List   Diagnosis    Endometrioma of ovary    Itching    Hypothyroidism    Current maternal condition affecting pregnancy    Pre-eclampsia in third trimester    Fetal heart rate non-reassuring affecting management of mother    Cholestasis during pregnancy    Rh negative state in antepartum period    S/P  section     Plan:   1. Postpartum care:  - Patient doing well. Continue routine management  and advances.  - Continue PO pain meds. Pain well controlled.  - Heme: H/h  >   - Encourage ambulation  - Contraception: OCPs  - Lactation consult PRN    2. preE w SF  - now s/p delivery and s/p MgSO4  - BP: (144-159)/(72-85) 153/85  - Start procardia  XL 60 mg 2/2 continued elevated blood pressures, not requiring IV pushes  - UOP: 6100 mL in last 24 hours  - continue to monitor bps and UOP  - weight: 94kg on 10/13    3. Hypothyroidism  - continue home synthroid    4. Peritoneal ascites  - 1.5 L removed at time of   - cytology pending    5. Lower extremity edema  - improved since prior exam  - s/p 20 mg lasix    6. Prepregnancy BMI >40  - meets criteria for postpartum lovenox  - lovenox 40 mg qD    7. Anxiety  - Pt counseled on options for speaking to , social work and declines  - Pt declines wanting to start medication  - Will require 1-2 post partum mood check    As patient meets milestones, will plan to discharge on POD#3-4.    So Garcia MD/MPH  OB/GYN PGY1

## 2020-10-13 NOTE — PROGRESS NOTES
10/13/20 1256   Vital Signs   Temp 98.4 °F (36.9 °C)   Temp src Oral   Pulse 86   Heart Rate Source Monitor   Resp 18   SpO2 96 %   BP (!) 159/85   BP Location Right arm   BP Method Automatic   Patient Position Sitting     Ptn /85, RN rechecked a manual BP on ptn. Manual /79. Dr. Gutierrez notified. Recheck in 15min.

## 2020-10-13 NOTE — PROGRESS NOTES
10/13/20 1324   Vital Signs   BP (!) 164/78   BP Method Manual     BP recheck. Dr. Gutierrez notifed. New order placed for IVP Labetolol.

## 2020-10-13 NOTE — LACTATION NOTE
Lactation Note: Met mother and father at bedside; Introduced self. Discussed the importance of frequent pumping in first two weeks to establish a full breast milk supply. Encouraged pumping 8 or more times in 24 hours and skin to skin care when baby able. Pumping supplies at bedside. Parents have Medela personal pump and plan to use Medela Symphony at bedside. Encouragement and support offered to mom and dad.   Zonia Mallory, BSN, RN, CLC, IBCLC

## 2020-10-13 NOTE — LACTATION NOTE
"   10/13/20 1100   Equipment Type   Breast Pump Type double electric, hospital grade   Breast Pump Flange Type hard   Breast Pump Flange Size 24 mm   Breast Pumping   Breast Pumping Interventions frequent pumping encouraged;early pumping promoted   Breast Pumping other (see comments)  (to pump 8 or more times in 24hrs)   Lactation Referrals   Lactation Referrals other (see comments)  (lactation warmline)   Discharge lactation education reviewed for breast pumping for baby in NICU. Mother has pumped 7 times in last 24hrs. Encouraged to pump 8 or more times in 24hrs, hand express after pumping; to make hands free pumping bra in order to use "hands on pumping" to maximize milk production. PT has medela pump for home use; plans to stay at [facility] while baby in NICU; plans to use Symphony at baby's bedside in NICU. Pt now expressing 20ml+ explained to switch to maintain setting and pump 20-25min each pumping session. Encouraged warm compresses before/ during pumping and cold compresses after for engorgement. More bottles, insulated bag and lanolin provided. Pt to bring MEdela pump in from car to learn how to use pump before discharge.  number on board.   "

## 2020-10-13 NOTE — PLAN OF CARE
Problem:  Fall Injury Risk  Goal: Absence of Fall, Infant Drop and Related Injury  Outcome: Ongoing, Progressing     Problem: Adult Inpatient Plan of Care  Goal: Plan of Care Review  Outcome: Ongoing, Progressing  Goal: Patient-Specific Goal (Individualization)  Outcome: Ongoing, Progressing  Goal: Absence of Hospital-Acquired Illness or Injury  Outcome: Ongoing, Progressing  Goal: Optimal Comfort and Wellbeing  Outcome: Ongoing, Progressing  Goal: Readiness for Transition of Care  Outcome: Ongoing, Progressing  Goal: Rounds/Family Conference  Outcome: Ongoing, Progressing     Problem: Maternal-Fetal Wellbeing  Goal: Optimal Maternal-Fetal Wellbeing  Outcome: Ongoing, Progressing     Problem: Hypertensive Disorders in Pregnancy  Goal: Maternal-Fetal Stabilization  Outcome: Ongoing, Progressing     Problem: Bariatric Environmental Safety  Goal: Safety Maintained with Care  Outcome: Ongoing, Progressing     Problem: Infection  Goal: Infection Symptom Resolution  Outcome: Ongoing, Progressing     Problem:  Labor  Goal: Delayed  Delivery  Outcome: Ongoing, Progressing     Problem: Skin Injury Risk Increased  Goal: Skin Health and Integrity  Outcome: Ongoing, Progressing     Problem: Breastfeeding  Goal: Effective Breastfeeding  Outcome: Ongoing, Progressing     Problem: Adjustment to Role Transition (Postpartum  Delivery)  Goal: Successful Maternal Role Transition  Outcome: Ongoing, Progressing     Problem: Bleeding (Postpartum  Delivery)  Goal: Hemostasis  Outcome: Ongoing, Progressing     Problem: Infection (Postpartum  Delivery)  Goal: Absence of Infection Signs/Symptoms  Outcome: Ongoing, Progressing     Problem: Pain (Postpartum  Delivery)  Goal: Acceptable Pain Control  Outcome: Ongoing, Progressing     Problem: Postoperative Nausea and Vomiting (Postpartum  Delivery)  Goal: Nausea and Vomiting Relief  Outcome: Ongoing, Progressing     Problem:  Postoperative Urinary Retention (Postpartum  Delivery)  Goal: Effective Urinary Elimination  Outcome: Ongoing, Progressing     Plan to discharge today if cleared by OBGYN.

## 2020-10-14 LAB
BACTERIA SPEC AEROBE CULT: NORMAL
FINAL PATHOLOGIC DIAGNOSIS: NORMAL
GROSS: NORMAL
Lab: NORMAL

## 2020-10-14 PROCEDURE — 25000003 PHARM REV CODE 250: Performed by: STUDENT IN AN ORGANIZED HEALTH CARE EDUCATION/TRAINING PROGRAM

## 2020-10-14 PROCEDURE — 63600175 PHARM REV CODE 636 W HCPCS: Performed by: STUDENT IN AN ORGANIZED HEALTH CARE EDUCATION/TRAINING PROGRAM

## 2020-10-14 PROCEDURE — 11000001 HC ACUTE MED/SURG PRIVATE ROOM

## 2020-10-14 PROCEDURE — 99232 SBSQ HOSP IP/OBS MODERATE 35: CPT | Mod: 24,,, | Performed by: OBSTETRICS & GYNECOLOGY

## 2020-10-14 PROCEDURE — 99232 PR SUBSEQUENT HOSPITAL CARE,LEVL II: ICD-10-PCS | Mod: 24,,, | Performed by: OBSTETRICS & GYNECOLOGY

## 2020-10-14 RX ORDER — LABETALOL 200 MG/1
200 TABLET, FILM COATED ORAL EVERY 12 HOURS
Status: DISCONTINUED | OUTPATIENT
Start: 2020-10-14 | End: 2020-10-15 | Stop reason: HOSPADM

## 2020-10-14 RX ADMIN — CEPHALEXIN 500 MG: 500 CAPSULE ORAL at 11:10

## 2020-10-14 RX ADMIN — CEPHALEXIN 500 MG: 500 CAPSULE ORAL at 05:10

## 2020-10-14 RX ADMIN — DOCUSATE SODIUM 200 MG: 100 CAPSULE, LIQUID FILLED ORAL at 08:10

## 2020-10-14 RX ADMIN — CEPHALEXIN 500 MG: 500 CAPSULE ORAL at 12:10

## 2020-10-14 RX ADMIN — IBUPROFEN 600 MG: 600 TABLET ORAL at 06:10

## 2020-10-14 RX ADMIN — IBUPROFEN 600 MG: 600 TABLET ORAL at 11:10

## 2020-10-14 RX ADMIN — Medication: at 08:10

## 2020-10-14 RX ADMIN — ENOXAPARIN SODIUM 40 MG: 40 INJECTION SUBCUTANEOUS at 06:10

## 2020-10-14 RX ADMIN — IBUPROFEN 600 MG: 600 TABLET ORAL at 12:10

## 2020-10-14 RX ADMIN — NIFEDIPINE 90 MG: 30 TABLET, FILM COATED, EXTENDED RELEASE ORAL at 08:10

## 2020-10-14 RX ADMIN — CEPHALEXIN 500 MG: 500 CAPSULE ORAL at 06:10

## 2020-10-14 RX ADMIN — MUPIROCIN: 20 OINTMENT TOPICAL at 08:10

## 2020-10-14 RX ADMIN — LEVOTHYROXINE SODIUM 50 MCG: 25 TABLET ORAL at 05:10

## 2020-10-14 RX ADMIN — LABETALOL HYDROCHLORIDE 200 MG: 200 TABLET, FILM COATED ORAL at 02:10

## 2020-10-14 RX ADMIN — IBUPROFEN 600 MG: 600 TABLET ORAL at 05:10

## 2020-10-14 NOTE — LACTATION NOTE
Lactation Round: Pt continues to pump for baby in NICU. LC provided education on Medela Pump and Style. Reviewed Symphony pump usage and reminded Pt that Symphony breast pump for use at baby's bedside. Pt acknowledged understanding.

## 2020-10-14 NOTE — PLAN OF CARE
Patient safety maintained, side rails up, bed low and locked position. Pt ambulating and voiding independently. Pain well controlled with PRN pain medication. Fundus midline, firm, with light lochia. Patient pumping for infant in NICU. Incision site CHICO; incision clean, dry, and intact. BP has been stable throughout the night. Significant other at bedside and assisting in patient's care. Will continue to monitor.

## 2020-10-14 NOTE — PROGRESS NOTES
POSTPARTUM PROGRESS NOTE    Shanae Vo is a 35 y.o. female POD #5 status post Primary  section at 31w1d in a pregnancy complicated by preeclampsia, cholestasis, and hypothyroidism.   Pt had elevated blood pressures which did not require treatment with IV medication. Pt is asymptomatic and feels significantly better this morning.   She denies nausea, vomiting, fever/chills, HA, vision changes, SOB. Endorses edema that has improved since yesterday, as well as pain on the area of her mons.  Patient reports mild abdominal pain that is adequately relieved by oral pain medications. Lochia is mild and stable. Ambulating without difficulty. Voiding without difficulty. Passing flatus and having bowel movements. Infant in NICU. Desires OCPs for contraception.    Objective:      Temp:  [97.9 °F (36.6 °C)-98.4 °F (36.9 °C)] 98.2 °F (36.8 °C)  Pulse:  [81-91] 86  Resp:  [12-18] 18  SpO2:  [96 %-99 %] 97 %  BP: (124-164)/(62-85) 124/68    Physical Exam  A&Ox3, NAD  nonlabored breathing, no respiratory distress  Abd soft, mildly distended, mildly tender to palpation without rebound/guarding]  Incision clean, dry, intact  Fundus not palpable  1 cm area of induration to left side of mons, no active drainage, nontender  Symmetric lower extremity edema, SCDs on and functioning    Lab Review  No results found for this or any previous visit (from the past 4 hour(s)).  I/O    Intake/Output Summary (Last 24 hours) at 10/14/2020 0644  Last data filed at 10/14/2020 0519  Gross per 24 hour   Intake 3050 ml   Output 5900 ml   Net -2850 ml     Assessment:     Patient Active Problem List   Diagnosis    Endometrioma of ovary    Itching    Hypothyroidism    Current maternal condition affecting pregnancy    Pre-eclampsia in third trimester    Fetal heart rate non-reassuring affecting management of mother    Cholestasis during pregnancy    Rh negative state in antepartum period    S/P  section     Plan:   1.  Postpartum care:  - Patient doing well. Continue routine management and advances.  - Continue PO pain meds. Pain well controlled.  - Heme: H/h  >   - Encourage ambulation  - Contraception: OCPs  - Lactation consult PRN    2. preE w SF  - now s/p delivery and s/p MgSO4  - BP: (124-164)/(62-85) 124/68  - Start procardia  XL 90 mg 2/2 continued elevated blood pressures, not requiring IV pushes  - UOP: 5900 mL in last 24 hours  - continue to monitor bps and UOP  - weight: 94kg on 10/13    3. Hypothyroidism  - continue home synthroid    4. Peritoneal ascites  - 1.5 L removed at time of   - cytology pending    5. Lower extremity edema  - improved since prior exam  - s/p 20 mg lasix    6. Prepregnancy BMI >40  - meets criteria for postpartum lovenox  - lovenox 40 mg qD    7. Anxiety  - Pt counseled on options for speaking to , social work and declines  - Pt declines wanting to start medication  - Will require 1-2 post partum mood check    As patient meets milestones, will plan to discharge.    So Garcia MD/MPH  OB/GYN PGY1

## 2020-10-15 VITALS
RESPIRATION RATE: 18 BRPM | DIASTOLIC BLOOD PRESSURE: 71 MMHG | BODY MASS INDEX: 38.21 KG/M2 | WEIGHT: 202.38 LBS | HEART RATE: 87 BPM | OXYGEN SATURATION: 98 % | TEMPERATURE: 98 F | SYSTOLIC BLOOD PRESSURE: 132 MMHG | HEIGHT: 61 IN

## 2020-10-15 PROCEDURE — 25000003 PHARM REV CODE 250: Performed by: STUDENT IN AN ORGANIZED HEALTH CARE EDUCATION/TRAINING PROGRAM

## 2020-10-15 PROCEDURE — 94799 UNLISTED PULMONARY SVC/PX: CPT

## 2020-10-15 PROCEDURE — 99239 PR HOSPITAL DISCHARGE DAY,>30 MIN: ICD-10-PCS | Mod: 24,,, | Performed by: OBSTETRICS & GYNECOLOGY

## 2020-10-15 PROCEDURE — 99239 HOSP IP/OBS DSCHRG MGMT >30: CPT | Mod: 24,,, | Performed by: OBSTETRICS & GYNECOLOGY

## 2020-10-15 RX ORDER — LABETALOL 200 MG/1
200 TABLET, FILM COATED ORAL EVERY 12 HOURS
Qty: 60 TABLET | Refills: 11 | Status: SHIPPED | OUTPATIENT
Start: 2020-10-15 | End: 2020-11-27

## 2020-10-15 RX ORDER — NIFEDIPINE 90 MG/1
90 TABLET, EXTENDED RELEASE ORAL DAILY
Qty: 30 TABLET | Refills: 11 | Status: SHIPPED | OUTPATIENT
Start: 2020-10-16 | End: 2020-10-27

## 2020-10-15 RX ADMIN — LEVOTHYROXINE SODIUM 50 MCG: 25 TABLET ORAL at 05:10

## 2020-10-15 RX ADMIN — CEPHALEXIN 500 MG: 500 CAPSULE ORAL at 12:10

## 2020-10-15 RX ADMIN — IBUPROFEN 600 MG: 600 TABLET ORAL at 11:10

## 2020-10-15 RX ADMIN — DOCUSATE SODIUM 200 MG: 100 CAPSULE, LIQUID FILLED ORAL at 08:10

## 2020-10-15 RX ADMIN — LABETALOL HYDROCHLORIDE 200 MG: 200 TABLET, FILM COATED ORAL at 08:10

## 2020-10-15 RX ADMIN — IBUPROFEN 600 MG: 600 TABLET ORAL at 05:10

## 2020-10-15 RX ADMIN — LABETALOL HYDROCHLORIDE 200 MG: 200 TABLET, FILM COATED ORAL at 02:10

## 2020-10-15 RX ADMIN — IBUPROFEN 600 MG: 600 TABLET ORAL at 12:10

## 2020-10-15 RX ADMIN — NIFEDIPINE 90 MG: 30 TABLET, FILM COATED, EXTENDED RELEASE ORAL at 08:10

## 2020-10-15 RX ADMIN — CEPHALEXIN 500 MG: 500 CAPSULE ORAL at 11:10

## 2020-10-15 RX ADMIN — CEPHALEXIN 500 MG: 500 CAPSULE ORAL at 05:10

## 2020-10-15 RX ADMIN — Medication: at 08:10

## 2020-10-15 NOTE — PLAN OF CARE
Reviewed [facility] resource with parents. They are trying to decide if they want to stay there or the [facility]. Parents aware that they must inform sw as soon as possible about the [facility] so a referral can be made.     Inez Gomes LCSW    [facility]  [email or web address]    (phone) [phone] or  [place]. [phone]  (fax) [phone]

## 2020-10-15 NOTE — LACTATION NOTE
Lactation Round: LC discussed Pt's option for obtaining vince pump. Pt aware to contact LC in NICU to obtain vince pump for use. LC placed contact information on board and encouraged Pt to call for further assistance.

## 2020-10-15 NOTE — PLAN OF CARE
Pt in no apparent distress. VSS. Ambulating and voiding without difficulty. Pumping and bringing EBM to NICU for infant. Incision WDL, cyst noted on vulva dry, pink, healing well. Blood pressures WDL throughout shift. Pain well controlled with current pain regimen. No acute events this shift. No additional needs this time.

## 2020-10-15 NOTE — PLAN OF CARE
CT in [facility] to complete paperwork for [employer]  Ct enrolled in [address] [place] [address]  CT also received [facility] paperwork  CM emailed Mrs Leandra Dillard regarding ct needing to complete paperwork as he is employed under the table at a [employer]  Pt on RA. IS done

## 2020-10-15 NOTE — PROGRESS NOTES
POSTPARTUM PROGRESS NOTE    Shanae Vo is a 35 y.o. female POD #6 status post Primary  section at 31w1d in a pregnancy complicated by preeclampsia, cholestasis, and hypothyroidism.   Pt had elevated blood pressures which did not require treatment with IV medication. Pt is asymptomatic and feels significantly better this morning.   She denies nausea, vomiting, fever/chills, HA, vision changes, SOB. Endorses edema that has improved since yesterday, as well as pain on the area of her mons.  Patient reports mild abdominal pain that is adequately relieved by oral pain medications. Lochia is mild and stable. Ambulating without difficulty. Voiding without difficulty. Passing flatus and having bowel movements. Infant in NICU. Desires OCPs for contraception.    Objective:      Temp:  [98 °F (36.7 °C)-98.3 °F (36.8 °C)] 98.1 °F (36.7 °C)  Pulse:  [81-94] 81  Resp:  [18] 18  SpO2:  [95 %-98 %] 96 %  BP: (116-159)/(59-86) 143/70    Physical Exam  A&Ox3, NAD  nonlabored breathing, no respiratory distress  Abd soft, mildly distended, mildly tender to palpation without rebound/guarding]  Incision clean, dry, intact  Fundus not palpable  1 cm area of induration to left side of mons, no active drainage, nontender  Symmetric lower extremity edema, SCDs on and functioning    Lab Review  No results found for this or any previous visit (from the past 4 hour(s)).  I/O    Intake/Output Summary (Last 24 hours) at 10/15/2020 0939  Last data filed at 10/15/2020 0600  Gross per 24 hour   Intake 1120 ml   Output 1600 ml   Net -480 ml     Assessment:     Patient Active Problem List   Diagnosis    Endometrioma of ovary    Itching    Hypothyroidism    Current maternal condition affecting pregnancy    Pre-eclampsia in third trimester    Fetal heart rate non-reassuring affecting management of mother    Cholestasis during pregnancy    Rh negative state in antepartum period    S/P  section     Plan:   1. Postpartum  care:  - Patient doing well. Continue routine management and advances.  - Continue PO pain meds. Pain well controlled.  - Heme: H/h  >   - Encourage ambulation  - Contraception: OCPs  - Lactation consult PRN    2. preE w SF  - now s/p delivery and s/p MgSO4  - BP: (116-159)/(59-86) 143/70  - continue procardia  XL 90 mg  - continue labetalol 200 mg qD  - continue to monitor bps and UOP  - weight: 91.8kg on 10/15    3. Hypothyroidism  - continue home synthroid    4. Peritoneal ascites  - 1.5 L removed at time of   - cytology pending    5. Lower extremity edema  - improved since prior exam  - s/p 20 mg lasix    6. Prepregnancy BMI >40  - meets criteria for postpartum lovenox  - lovenox 40 mg qD    7. Anxiety  - Pt counseled on options for speaking to , social work and declines  - Pt declines wanting to start medication  - Will require 1-2 post partum mood check    As patient meets milestones, will plan to discharge.    So Garcia MD/MPH  OB/GYN PGY1

## 2020-10-16 NOTE — PROGRESS NOTES
Pt. Discharged to home. RN discussed and reviewed discharge paperwork and hypertension education/ signs and symptoms. Pt. Verbalized understanding about making her 1 week appointment for blood pressure check and 6 week postpartum appointment.

## 2020-10-17 ENCOUNTER — TELEPHONE (OUTPATIENT)
Dept: LACTATION | Facility: CLINIC | Age: 35
End: 2020-10-17

## 2020-10-17 NOTE — TELEPHONE ENCOUNTER
Pt is pumping 8 times daily for at least 20 minutes. Pt has no questions. Reviewed pumping education. Support and encouragement given.

## 2020-10-20 ENCOUNTER — POSTPARTUM VISIT (OUTPATIENT)
Dept: OBSTETRICS AND GYNECOLOGY | Facility: CLINIC | Age: 35
End: 2020-10-20
Payer: OTHER GOVERNMENT

## 2020-10-20 VITALS
WEIGHT: 187.38 LBS | BODY MASS INDEX: 35.38 KG/M2 | HEIGHT: 61 IN | SYSTOLIC BLOOD PRESSURE: 118 MMHG | DIASTOLIC BLOOD PRESSURE: 70 MMHG

## 2020-10-20 DIAGNOSIS — O14.93 PRE-ECLAMPSIA IN THIRD TRIMESTER: Primary | ICD-10-CM

## 2020-10-20 DIAGNOSIS — Z98.891 S/P CESAREAN SECTION: ICD-10-CM

## 2020-10-20 PROCEDURE — 99213 PR OFFICE/OUTPT VISIT, EST, LEVL III, 20-29 MIN: ICD-10-PCS | Mod: 24,S$PBB,, | Performed by: OBSTETRICS & GYNECOLOGY

## 2020-10-20 PROCEDURE — 99999 PR PBB SHADOW E&M-EST. PATIENT-LVL III: ICD-10-PCS | Mod: PBBFAC,,, | Performed by: OBSTETRICS & GYNECOLOGY

## 2020-10-20 PROCEDURE — 99213 OFFICE O/P EST LOW 20 MIN: CPT | Mod: 24,S$PBB,, | Performed by: OBSTETRICS & GYNECOLOGY

## 2020-10-20 PROCEDURE — 99213 OFFICE O/P EST LOW 20 MIN: CPT | Mod: PBBFAC | Performed by: OBSTETRICS & GYNECOLOGY

## 2020-10-20 PROCEDURE — 99999 PR PBB SHADOW E&M-EST. PATIENT-LVL III: CPT | Mod: PBBFAC,,, | Performed by: OBSTETRICS & GYNECOLOGY

## 2020-10-20 RX ORDER — CEPHALEXIN 500 MG/1
500 CAPSULE ORAL EVERY 6 HOURS
COMMUNITY
Start: 2020-10-13 | End: 2020-10-21

## 2020-10-20 NOTE — PROGRESS NOTES
"CC: Post-partum follow-up    Shanae Vo is a 35 y.o. female  presents for post-partum visit s/p a , due to Preeclampsia with severe features at 31w1d, NRFHT and hand presentation.    Delivery Date: 10/12/20  Delivery MD: Harmony Singh MD  Gender: male  Birth Weight:1200g  Breast Feeding: YES, pumping for infant in NICU  Depression: NO  Contraception: no method    Pregnancy was complicated by:  Preeclampsia with severe features, cholestasis of pregnancy, NRFHT, groin abscess.   Pt is doing well today.  Denies any Pre-eclampsia symptoms.  No HAs, vision changes, RUQ pain.  Pt is currently taking Labetalol 200 BID and Procardia 90 XL.  Reports one episode of feeling light headed and dizzy while showering yesterday.        /70   Ht 5' 1" (1.549 m)   Wt 85 kg (187 lb 6.3 oz)   LMP 2020   Breastfeeding Yes Comment: pumping  BMI 35.41 kg/m²     ROS:  GENERAL: No fever, chills, fatigability or weight loss.  VULVAR: No pain, no lesions and no itching.  VAGINAL: No relaxation, no itching, no discharge, no abnormal bleeding and no lesions.  ABDOMEN: No abdominal pain. Denies nausea. Denies vomiting. No diarrhea. No constipation  BREAST: Denies pain. No lumps. No discharge.  URINARY: No incontinence, no nocturia, no frequency and no dysuria.  CARDIOVASCULAR: No chest pain. No shortness of breath. No leg cramps.  NEUROLOGICAL: No headaches. No vision changes.    PHYSICAL EXAM:  PELVIC: A&O x3  ABDOMEN: appropriately tender.  No signs of incisional infection or dehiscence  Rest of exam deferred.   EXTREMITIES: 1+ pitting edema in bilateral lower extremities     PROCEDURES:  - none        Diagnosis:  1. Pre-eclampsia in third trimester    2. S/P  section        Plan:       Shanae was seen today for postpartum care.    Diagnoses and all orders for this visit:    Pre-eclampsia in third trimester  - Doing well in postpartum period  - Asymptomsatic  - /70 on medication.  BP " prior to this 100/60.  Discussed weaning off the labetalol.  Plans to not take evening dose of labetalol and check BP.  If still normotensive, will not take medication tonight.  Will check BP in the morning.  If still normotensive, will discontinue labetalol.  Will take BP 3x/day  - Pt to call/message with any issues.  Given preeclampsia precautions.  All questions answered.     S/P  section  - Healing appropriately      No orders of the defined types were placed in this encounter.      Follow up in about 2 weeks (around 11/3/2020) for postpartum visit.      Harmony Singh MD  Obstetrics & Gynecology

## 2020-10-21 ENCOUNTER — PATIENT MESSAGE (OUTPATIENT)
Dept: OBSTETRICS AND GYNECOLOGY | Facility: CLINIC | Age: 35
End: 2020-10-21

## 2020-10-21 LAB
BACTERIA SPEC ANAEROBE CULT: ABNORMAL
BACTERIA SPEC ANAEROBE CULT: ABNORMAL

## 2020-10-27 RX ORDER — NIFEDIPINE 60 MG/1
60 TABLET, EXTENDED RELEASE ORAL DAILY
Qty: 30 TABLET | Refills: 11 | Status: SHIPPED | OUTPATIENT
Start: 2020-10-27 | End: 2020-11-04

## 2020-10-30 ENCOUNTER — PATIENT MESSAGE (OUTPATIENT)
Dept: ADMINISTRATIVE | Facility: OTHER | Age: 35
End: 2020-10-30

## 2020-11-02 ENCOUNTER — TELEPHONE (OUTPATIENT)
Dept: OBSTETRICS AND GYNECOLOGY | Facility: CLINIC | Age: 35
End: 2020-11-02

## 2020-11-02 NOTE — TELEPHONE ENCOUNTER
----- Message from Mckenzie Rodgers sent at 11/2/2020  9:44 AM CST -----  Regarding: Appt access  Who called:PAWEL HONG [[id number]]    What is the request in detail: Patient is requesting a call back. She states she is not able to make her PP visit tomorrow. Next available 11/17. She would like to know if she can be seen sooner.  Please advise.    Can the clinic reply by [facility]? No    Best call back number:[phone]    Additional Information: N/A

## 2020-11-04 ENCOUNTER — POSTPARTUM VISIT (OUTPATIENT)
Dept: OBSTETRICS AND GYNECOLOGY | Facility: CLINIC | Age: 35
End: 2020-11-04
Payer: OTHER GOVERNMENT

## 2020-11-04 VITALS
DIASTOLIC BLOOD PRESSURE: 76 MMHG | SYSTOLIC BLOOD PRESSURE: 122 MMHG | BODY MASS INDEX: 34.17 KG/M2 | HEIGHT: 61 IN | WEIGHT: 181 LBS

## 2020-11-04 DIAGNOSIS — Z98.891 S/P CESAREAN SECTION: ICD-10-CM

## 2020-11-04 DIAGNOSIS — O14.93 PRE-ECLAMPSIA IN THIRD TRIMESTER: Primary | ICD-10-CM

## 2020-11-04 PROCEDURE — 99999 PR PBB SHADOW E&M-EST. PATIENT-LVL III: CPT | Mod: PBBFAC,,, | Performed by: OBSTETRICS & GYNECOLOGY

## 2020-11-04 PROCEDURE — 99213 PR OFFICE/OUTPT VISIT, EST, LEVL III, 20-29 MIN: ICD-10-PCS | Mod: 24,S$PBB,, | Performed by: OBSTETRICS & GYNECOLOGY

## 2020-11-04 PROCEDURE — 99213 OFFICE O/P EST LOW 20 MIN: CPT | Mod: 24,S$PBB,, | Performed by: OBSTETRICS & GYNECOLOGY

## 2020-11-04 PROCEDURE — 99213 OFFICE O/P EST LOW 20 MIN: CPT | Mod: PBBFAC | Performed by: OBSTETRICS & GYNECOLOGY

## 2020-11-04 PROCEDURE — 99999 PR PBB SHADOW E&M-EST. PATIENT-LVL III: ICD-10-PCS | Mod: PBBFAC,,, | Performed by: OBSTETRICS & GYNECOLOGY

## 2020-11-04 RX ORDER — DOCUSATE SODIUM 100 MG/1
100 CAPSULE, LIQUID FILLED ORAL 2 TIMES DAILY PRN
COMMUNITY
End: 2021-02-18

## 2020-11-04 RX ORDER — NIFEDIPINE 30 MG/1
30 TABLET, EXTENDED RELEASE ORAL DAILY
Qty: 30 TABLET | Refills: 11 | Status: SHIPPED | OUTPATIENT
Start: 2020-11-04 | End: 2020-11-27

## 2020-11-04 NOTE — PROGRESS NOTES
"CC: Post-partum follow-up    Shanae Vo is a 35 y.o. female  presents for post-partum visit s/p a , due to Preeclampsia with severe features at 31w1d, NRFHT and hand presentation.    Delivery Date: 10/12/20  Delivery MD: Harmony Singh MD  Gender: male  Birth Weight:1200g  Breast Feeding: YES, pumping for infant in NICU  Depression: NO  Contraception: no method    Pregnancy was complicated by:  Preeclampsia with severe features, cholestasis of pregnancy, NRFHT, groin abscess.   Pt is doing well today.  Denies any Pre-eclampsia symptoms.  No HAs, vision changes, RUQ pain.  Pt has been weaned down to Procardia 60 XL and doing well with this dosage.    /76   Ht 5' 1" (1.549 m)   Wt 82.1 kg (181 lb)   LMP 2020   Breastfeeding Yes   BMI 34.20 kg/m²     ROS:  GENERAL: No fever, chills, fatigability or weight loss.  VULVAR: No pain, no lesions and no itching.  VAGINAL: No relaxation, no itching, no discharge, no abnormal bleeding and no lesions.  ABDOMEN: No abdominal pain. Denies nausea. Denies vomiting. No diarrhea. No constipation  BREAST: Denies pain. No lumps. No discharge.  URINARY: No incontinence, no nocturia, no frequency and no dysuria.  CARDIOVASCULAR: No chest pain. No shortness of breath. No leg cramps.  NEUROLOGICAL: No headaches. No vision changes.    PHYSICAL EXAM:  PELVIC: A&O x3  ABDOMEN: pfannenstiel incision well healed.  No signs of infection, dehiscence, bleeding or discharge.  Rest of exam deferred.        PROCEDURES:  - none        Diagnosis:  1. Pre-eclampsia in third trimester    2. S/P  section        Plan:     Orders Placed This Encounter    NIFEdipine (PROCARDIA-XL) 30 MG (OSM) 24 hr tablet    Shanae was seen today for postpartum care.    Diagnoses and all orders for this visit:    Shanae was seen today for postpartum care.    Diagnoses and all orders for this visit:    Pre-eclampsia in third trimester  - Pt doing well on Procardia 60XL. "   - BP normal today (122/76).   Will decrease dosage to 30XL  - Pt to let us know if BP increases on new dosage.  Otherwise will continue 30 XL and attempt to wean off completely in the upcoming weeks  -     NIFEdipine (PROCARDIA-XL) 30 MG (OSM) 24 hr tablet; Take 1 tablet (30 mg total) by mouth once daily.    S/P  section  - No issues.  Healing appropriately      No orders of the defined types were placed in this encounter.      Follow up in about 4 weeks (around 2020) for postpartum visit.      Harmony Singh MD  Obstetrics & Gynecology

## 2020-11-17 ENCOUNTER — PATIENT MESSAGE (OUTPATIENT)
Dept: OBSTETRICS AND GYNECOLOGY | Facility: CLINIC | Age: 35
End: 2020-11-17

## 2020-11-18 ENCOUNTER — OFFICE VISIT (OUTPATIENT)
Dept: OBSTETRICS AND GYNECOLOGY | Facility: CLINIC | Age: 35
End: 2020-11-18
Payer: OTHER GOVERNMENT

## 2020-11-18 VITALS
HEIGHT: 61 IN | DIASTOLIC BLOOD PRESSURE: 82 MMHG | BODY MASS INDEX: 34.01 KG/M2 | SYSTOLIC BLOOD PRESSURE: 124 MMHG | WEIGHT: 180.13 LBS

## 2020-11-18 DIAGNOSIS — B37.89 CANDIDIASIS OF BREAST: Primary | ICD-10-CM

## 2020-11-18 PROCEDURE — 99999 PR PBB SHADOW E&M-EST. PATIENT-LVL III: ICD-10-PCS | Mod: PBBFAC,,, | Performed by: OBSTETRICS & GYNECOLOGY

## 2020-11-18 PROCEDURE — 99213 PR OFFICE/OUTPT VISIT, EST, LEVL III, 20-29 MIN: ICD-10-PCS | Mod: S$PBB,24,, | Performed by: OBSTETRICS & GYNECOLOGY

## 2020-11-18 PROCEDURE — 99999 PR PBB SHADOW E&M-EST. PATIENT-LVL III: CPT | Mod: PBBFAC,,, | Performed by: OBSTETRICS & GYNECOLOGY

## 2020-11-18 PROCEDURE — 99213 OFFICE O/P EST LOW 20 MIN: CPT | Mod: PBBFAC | Performed by: OBSTETRICS & GYNECOLOGY

## 2020-11-18 PROCEDURE — 99213 OFFICE O/P EST LOW 20 MIN: CPT | Mod: S$PBB,24,, | Performed by: OBSTETRICS & GYNECOLOGY

## 2020-11-18 RX ORDER — FLUCONAZOLE 200 MG/1
200 TABLET ORAL DAILY
Qty: 14 TABLET | Refills: 0 | Status: SHIPPED | OUTPATIENT
Start: 2020-11-18 | End: 2020-12-02 | Stop reason: SDUPTHER

## 2020-11-18 NOTE — PROGRESS NOTES
History & Physical  Gynecology      SUBJECTIVE:     Chief Complaint: Breast Pain (Right breast)       History of Present Illness:  Pt is a 34 y/o s/p 1LTCD (10/31) at 31w1d here with pain of Right breast.  Pt's son is in the NICU and has started working on nursing with the baby.  Prior to this was pumping exclusively.  Started working on latching last week and has done a few sessions of latching since then.  However, yesterday started having some itching bilaterally of the breasts and then last night, started having intense pain deep behind the right breast.  No hard area or red area on either breast.  No fever/chills.     Review of patient's allergies indicates:   Allergen Reactions    Shrimp Nausea And Vomiting       Past Medical History:   Diagnosis Date    Finger infection     right finger- patient will go to urgent care and take care of it    Thyroid disease     hypothyroid     Past Surgical History:   Procedure Laterality Date     SECTION N/A 10/12/2020    Procedure:  SECTION;  Surgeon: Harmony Singh MD;  Location: [facility] L&D;  Service: OB/GYN;  Laterality: N/A;    DIAGNOSTIC LAPAROSCOPY N/A 10/28/2019    Procedure: LAPAROSCOPY, DIAGNOSTIC;  Surgeon: Luis Armando Vega MD;  Location: [facility] OR;  Service: OB/GYN;  Laterality: N/A;    LAPAROSCOPIC SURGICAL REMOVAL OF CYST OF OVARY Left 10/28/2019    Procedure: EXCISION, CYST, OVARY, LAPAROSCOPIC, LEFT;  Surgeon: Luis Armando Vega MD;  Location: [facility] OR;  Service: OB/GYN;  Laterality: Left;    WISDOM TOOTH EXTRACTION       OB History        1    Para   1    Term           1    AB        Living           SAB        TAB        Ectopic        Multiple   0    Live Births                   Family History   Problem Relation Age of Onset    Colon cancer Paternal Uncle     Breast cancer Neg Hx     Ovarian cancer Neg Hx      Social History     Tobacco Use    Smoking status: Never Smoker    Smokeless tobacco: Never Used    Substance Use Topics    Alcohol use: Not Currently     Comment: social    Drug use: Never       Current Outpatient Medications   Medication Sig    levothyroxine (TIROSINT) 50 mcg Cap Take 1 tablet by mouth once daily.    prenatal vit-iron fum-folic ac (RIGHT STEP PRENATAL VITAMINS) 27 mg iron- 0.8 mg Tab Take 1 tablet by mouth once daily.    bacitracin 500 unit/gram ointment Apply topically 2 (two) times daily. (Patient not taking: Reported on 11/4/2020)    butalbital-acetaminophen-caffeine -40 mg (FIORICET, ESGIC) -40 mg per tablet Take 1 tablet by mouth every 6 (six) hours as needed for Headaches. (Patient not taking: Reported on 11/4/2020)    docusate sodium (COLACE) 100 MG capsule Take 100 mg by mouth 2 (two) times daily as needed for Constipation.    fluconazole (DIFLUCAN) 200 MG Tab Take 1 tablet (200 mg total) by mouth once daily. for 14 days    ibuprofen (ADVIL,MOTRIN) 600 MG tablet Take 1 tablet (600 mg total) by mouth every 6 (six) hours. (Patient not taking: Reported on 11/4/2020)    labetaloL (NORMODYNE) 200 MG tablet Take 1 tablet (200 mg total) by mouth every 12 (twelve) hours. (Patient not taking: Reported on 11/4/2020)    NIFEdipine (PROCARDIA-XL) 30 MG (OSM) 24 hr tablet Take 1 tablet (30 mg total) by mouth once daily. (Patient not taking: Reported on 11/18/2020)    oxyCODONE-acetaminophen (PERCOCET) 5-325 mg per tablet Take 1 tablet by mouth every 4 (four) hours as needed. (Patient not taking: Reported on 11/4/2020)     No current facility-administered medications for this visit.          Review of Systems:  Review of Systems   Constitutional: Negative for activity change, appetite change, chills, fatigue, fever and unexpected weight change.   Respiratory: Negative for cough, shortness of breath and wheezing.    Cardiovascular: Negative for chest pain and leg swelling.   Gastrointestinal: Negative for abdominal pain, constipation, diarrhea, nausea and vomiting.    Endocrine: Negative for hair loss and hot flashes.   Genitourinary: Negative for decreased libido, dyspareunia, dysuria, frequency, menstrual problem, pelvic pain, vaginal bleeding, vaginal discharge and vaginal pain.   Integumentary:  Positive for breast tenderness. Negative for acne, hair changes, nipple discharge and breast skin changes.   Neurological: Negative for headaches.   Psychiatric/Behavioral: Negative for sleep disturbance.   Breast: Positive for mastodynia and tenderness.Negative for nipple discharge and skin changes       OBJECTIVE:     Physical Exam:  Physical Exam  Constitutional:       Appearance: She is well-developed.   HENT:      Head: Normocephalic and atraumatic.   Eyes:      General: No scleral icterus.        Right eye: No discharge.         Left eye: No discharge.      Conjunctiva/sclera: Conjunctivae normal.   Pulmonary:      Effort: Pulmonary effort is normal.      Breath sounds: No stridor.   Chest:      Chest wall: No mass or tenderness.      Breasts: Breasts are symmetrical.         Right: Tenderness present. No inverted nipple, mass, nipple discharge or skin change.         Left: No inverted nipple, mass, nipple discharge, skin change or tenderness.       Abdominal:      General: There is no distension.      Palpations: Abdomen is soft.      Tenderness: There is no abdominal tenderness.   Musculoskeletal: Normal range of motion.   Skin:     General: Skin is warm and dry.   Neurological:      Mental Status: She is alert and oriented to person, place, and time.   Psychiatric:         Behavior: Behavior normal.         Thought Content: Thought content normal.         Judgment: Judgment normal.           ASSESSMENT:       ICD-10-CM ICD-9-CM    1. Candidiasis of breast  B37.89 112.89 fluconazole (DIFLUCAN) 200 MG Tab          Plan:      Shanae was seen today for breast pain.    Diagnoses and all orders for this visit:    Candidiasis of breast  - Pt with bilateral itching and  irritation of nipples, as well as pain deep in right breast  - No signs of mastitis on exam.    - Will treat for possible candidiasis of the breasts.  Recommend assess baby for signs of thrush.  If positive, also needs treatment.  Also discussed need to sterilize all pump parts after each pumping to prevent reinoculation of yeast to her breast if there is yeast  -     fluconazole (DIFLUCAN) 200 MG Tab; Take 1 tablet (200 mg total) by mouth once daily. for 14 days        No orders of the defined types were placed in this encounter.      Follow up if symptoms worsen or fail to improve.     Counseling time: 30 minutes    Harmony Singh

## 2020-11-20 ENCOUNTER — PATIENT MESSAGE (OUTPATIENT)
Dept: OBSTETRICS AND GYNECOLOGY | Facility: CLINIC | Age: 35
End: 2020-11-20

## 2020-11-24 ENCOUNTER — PATIENT MESSAGE (OUTPATIENT)
Dept: OBSTETRICS AND GYNECOLOGY | Facility: CLINIC | Age: 35
End: 2020-11-24

## 2020-11-27 ENCOUNTER — OFFICE VISIT (OUTPATIENT)
Dept: URGENT CARE | Facility: CLINIC | Age: 35
End: 2020-11-27
Payer: OTHER GOVERNMENT

## 2020-11-27 ENCOUNTER — OFFICE VISIT (OUTPATIENT)
Dept: OTOLARYNGOLOGY | Facility: CLINIC | Age: 35
End: 2020-11-27
Payer: OTHER GOVERNMENT

## 2020-11-27 VITALS
HEART RATE: 70 BPM | RESPIRATION RATE: 16 BRPM | TEMPERATURE: 98 F | BODY MASS INDEX: 33.04 KG/M2 | WEIGHT: 175 LBS | SYSTOLIC BLOOD PRESSURE: 125 MMHG | OXYGEN SATURATION: 99 % | DIASTOLIC BLOOD PRESSURE: 81 MMHG | HEIGHT: 61 IN

## 2020-11-27 DIAGNOSIS — R20.8 BURNING SENSATION OF MOUTH: ICD-10-CM

## 2020-11-27 DIAGNOSIS — R68.2 DRY MOUTH: Primary | ICD-10-CM

## 2020-11-27 DIAGNOSIS — Z98.891 S/P CESAREAN SECTION: ICD-10-CM

## 2020-11-27 DIAGNOSIS — J35.8 MUCOCELE OF TONSIL: ICD-10-CM

## 2020-11-27 DIAGNOSIS — B37.0 THRUSH: Primary | ICD-10-CM

## 2020-11-27 DIAGNOSIS — J35.8 TONSILLITH: ICD-10-CM

## 2020-11-27 DIAGNOSIS — L60.0 INGROWN TOENAIL OF RIGHT FOOT: ICD-10-CM

## 2020-11-27 DIAGNOSIS — B37.2 YEAST DERMATITIS: ICD-10-CM

## 2020-11-27 LAB — VIT B12 SERPL-MCNC: 764 PG/ML (ref 210–950)

## 2020-11-27 PROCEDURE — 99214 OFFICE O/P EST MOD 30 MIN: CPT | Mod: S$GLB,,, | Performed by: FAMILY MEDICINE

## 2020-11-27 PROCEDURE — 82607 VITAMIN B-12: CPT

## 2020-11-27 PROCEDURE — 99203 OFFICE O/P NEW LOW 30 MIN: CPT | Mod: 95,,, | Performed by: OTOLARYNGOLOGY

## 2020-11-27 PROCEDURE — 99203 PR OFFICE/OUTPT VISIT, NEW, LEVL III, 30-44 MIN: ICD-10-PCS | Mod: 95,,, | Performed by: OTOLARYNGOLOGY

## 2020-11-27 PROCEDURE — 99214 PR OFFICE/OUTPT VISIT, EST, LEVL IV, 30-39 MIN: ICD-10-PCS | Mod: S$GLB,,, | Performed by: FAMILY MEDICINE

## 2020-11-27 RX ORDER — MUPIROCIN 20 MG/G
OINTMENT TOPICAL
COMMUNITY
Start: 2020-09-25 | End: 2021-02-18

## 2020-11-27 RX ORDER — CLOTRIMAZOLE 10 MG/1
10 LOZENGE ORAL; TOPICAL
Qty: 150 TABLET | Refills: 0 | Status: SHIPPED | OUTPATIENT
Start: 2020-11-27 | End: 2020-12-27

## 2020-11-27 NOTE — PROGRESS NOTES
Subjective:     Chief Complaint: dry mouth, spot on tonsil     Shanae Vo is a 35 y.o. female who was self-referred for the above.    Patient reports tonsillitis a few years ago. This occurred once. Denies new sore throat. Denies fever, pain, dysphagia, change in voice, odynophagia. She recently had  7 weeks ago and her son is in the NICU. She has been dealing with increased stress. She has noticed dry mouth and strange sensation on her tongue. She holly pain or burning of the mouth. She can't further describe the sensation. It has been stable. She also reports noticing a white spot on her left tonsil a few weeks ago. Denies pain in this region. She reports history of tonsil stones but this is different.     Currently on diflucan for skin yeast infection.     She is not an active smoker.    Past Medical History  She has a past medical history of Finger infection and Thyroid disease.    Past Surgical History  She has a past surgical history that includes Frederica tooth extraction; Diagnostic laparoscopy (N/A, 10/28/2019); Laparoscopic surgical removal of cyst of ovary (Left, 10/28/2019); and  section (N/A, 10/12/2020).    Family History  Her family history includes Colon cancer in her paternal uncle.    Social History  She reports that she has never smoked. She has never used smokeless tobacco. She reports previous alcohol use. She reports that she does not use drugs.    Allergies  She is allergic to shrimp.    Medications  She has a current medication list which includes the following prescription(s): bacitracin, clotrimazole, docusate sodium, fluconazole, levothyroxine, mupirocin, and right step prenatal vitamins.    Review of Systems     Constitutional: Negative for appetite change, chills, fatigue, fever and unexpected weight loss.      Eyes:  Negative for change in eyesight, eye drainage, eye itching and photophobia.     Respiratory:  Negative for cough, shortness of breath, sleep apnea,  snoring and wheezing.      Cardiovascular:  Negative for chest pain, foot swelling, irregular heartbeat and swollen veins.     Gastrointestinal:  Negative for abdominal pain, acid reflux, constipation, diarrhea, heartburn and vomiting.     Genitourinary: Negative for difficulty urinating, sexual problems and frequent urination.     Musc: Negative for aching joints, aching muscles, back pain and neck pain.     Skin: Negative for rash.     Allergy: Negative for food allergies and seasonal allergies.     Endocrine: Negative for cold intolerance and heat intolerance.      Neurological: Negative for dizziness, headaches, light-headedness, seizures and tremors.      Hematologic: Negative for bruises/bleeds easily and swollen glands.      Psychiatric: Negative for decreased concentration, depression, nervous/anxious and sleep disturbance.                Objective:     LMP 2020    Virtual visit:    Constitutional:   She appears well-developed and well-nourished.     Head:  Normocephalic and atraumatic.     Mouth/Throat                Procedure    None      Assessment:     1. Dry mouth    2. Mucocele of tonsil    3. S/P  section          Plan:     I had a long discussion with the patient regarding her condition and the further workup and management options.  No evidence of infection in the oral cavity or oropharynx. The spot on her tonsil appears to be a mucocele. Discussed benign nature of these. Discussed warm salt-water gargling nights. Also discussed Biotene mouthwash daily for increased moisture. She will follow up in person in 1-2 weeks if symptoms do not improve. All questions answered and patient encouraged to call with any questions or concerns.

## 2020-11-27 NOTE — PATIENT INSTRUCTIONS
Candida Skin Infection (Adult)  Candida is type of yeast. It grows naturally on the skin and in the mouth. If it grows out of control, it can cause an infection. Candida can cause infections in the genital area, skin folds, in the mouth, and under the breasts. Anyone can get this infection. It is more common in a person with a weak immune system, such as from diabetes, HIV, or cancer. Its also more common in someone who has been on antibiotic therapy. And its more common people who are overweight or who have incontinence. Wearing tight-fitting clothing and taking part in activities with lots of skin-to-skin contact can also put you at risk.  Candida causes the skin to become bright red and inflamed. The border of the infected part of the skin is often raised. The infection causes pain and itching. Sometimes the skin peels and bleeds. In the mouth, candida is called thrush, and may cause white thickened areas.  A Candida rash is most often treated with an antifungal cream or ointment. The rash will clear a few days after starting the medicine. Infections that dont go away may need a prescription medicine. In rare cases, a bacterial infection can also occur.  Home care  Your healthcare provider will recommend an antifungal cream or ointment for the rash. He or she may also prescribe a medicine for the itch. Follow all instructions for using these medicines. Dont use cornstarch powder. Cornstarch can cause the Candida infection to get worse.  General care:  · Keep your skin clean by washing the area twice a day.  · Use the cream as directed until your rash is gone. Once the skin has healed, keep it dry to prevent another infection.   · If you are overweight, talk with your healthcare provider about a plan to lose excess weight.  · Avoid clothes that fit tightly.  Follow-up care  Follow up with your healthcare provider, or as advised. Your rash will clear in 7 to 14 days. Call your healthcare provider if the rash  is not gone after 14 days.  When to seek medical advice  Call your healthcare provider right away if any of these occur:  · Pain or redness that gets worse or spreads  · Fluid coming from the skin  · Yellow crusts on the skin  · Fever of 100.4°F (38°C) or higher, or as directed by your healthcare provider  Date Last Reviewed: 9/1/2016 © 2000-2017 [employer]. [address] [place] [address], [place] [address]. All rights reserved. This information is not intended as a substitute for professional medical care. Always follow your healthcare professional's instructions.        Paronychia of the Finger or Toe  Paronychia is an infection near a fingernail or toenail. It usually occurs when an opening in the cuticle or an ingrown toenail lets bacteria under the skin.  The infection will need to be drained if pus is present. If the infection has been caught early, you may need only antibiotic treatment. Healing will take about 1 to 2 weeks.  Home care  Follow these guidelines when caring for yourself at home:  · Clean and soak the toe or finger. Do this 2 times a day for the first 3 days. To do so:  ¨ Soak your foot or hand in a tub of warm water for 5 minutes. Or hold your toe or finger under a faucet of warm running water for 5 minutes.  ¨ Clean any crust away with soap and water using a cotton swab.  ¨ Put antibiotic ointment on the infected area.  · Change the dressing daily or any time it gets dirty.  · If you were given antibiotics, take them as directed until they are all gone.  · If your infection is on a toe, wear comfortable shoes with a lot of toe room. You can also wear open-toed sandals while your toe heals.  · You may use over-the-counter medicine (acetaminophen or ibuprofen to help with pain, unless another medicine was prescribed. If you have chronic liver or kidney disease, talk with your healthcare provider before using these medicines. Also talk with your provider if you've had a stomach ulcer or  GI (gastrointestinal) bleeding.  Prevention  The following can prevent paronychia:  · Avoid cutting or playing with your cuticles at home.  · Don't bite your nails.  · Don't suck on your thumbs or fingers.  Follow-up care  Follow up with your healthcare provider, or as advised.  When to seek medical advice  Call your healthcare provider right away if any of these occur:  · Redness, pain, or swelling of the finger or toe gets worse  · Red streaks in the skin leading away from the wound  · Pus or fluid draining from the nail area  · Fever of 100.4ºF (38ºC) or higher, or as directed by your provider  Date Last Reviewed: 8/1/2016  © 5231-3578 [employer]. [address] [place] [address], [place] [address]. All rights reserved. This information is not intended as a substitute for professional medical care. Always follow your healthcare professional's instructions.        Candida Infection: Thrush  Thrush is a type of fungal infection in the mouth and throat. Thrush does not usually affect healthy adults. It is more common in people with a weakened immune system. It is also more likely if you take antibiotics. Thrush is normally not contagious.  Understanding fungus in the mouth and throat  Your mouth and throat normally contain millions of tiny organisms. These include bacteria and yeasts. Many of these do not cause any problems. In fact, they may help fight disease.  Yeasts are a type of fungus. A type of yeast called Candida normally lives on your skin. It is also found on the membranes of your mouth and throat. Usually, this yeast grows only in small amounts and is harmless. But in some cases, Candida can grow out of control and cause thrush. Thrush is related to other kinds of Candida infections that can grow all over the body. Thrush refers to an infection of only the mouth and throat.  What causes thrush?  Thrush happens when something lets too much Candida grow inside your mouth and throat. Certain things  that change the normal balance of organisms in the mouth can lead to thrush. One example is antibiotic medicine. This medicine may kill some of the normal bacteria in your mouth. Candida can then grow freely. People on antibiotics have an increased risk for thrush.  You have a higher risk for thrush if you:  · Wear dentures  · Are getting chemotherapy  · Are getting radiation therapy  · Have diabetes  · Have a transplanted organ  · Use corticosteroids  · Have a weakened immune system, such as from AIDS  · Are an older adult  Symptoms of thrush  Some people with thrush do not have any symptoms. Symptoms of thrush can include:  · A dry, cottony feeling in your mouth  · Loss of taste  · Pain while eating or swallowing  · White or red patches inside your mouth or on the back of your throat  Diagnosing thrush  Your health care provider will ask about your medical history and your symptoms. He or she will look closely at your mouth and throat. White or red patches will be scraped with a tongue depressor. The sample will be sent to a lab to test. This test can usually confirm thrush.  If you have thrush, you may also have esophageal candidiasis. This is common in people who have HIV. Your health care provider may check for this condition with an upper endoscopy. This is a procedure to look at the esophagus. A tissue sample may be taken to test.  Treatment for thrush  It is important to treat thrush early. Untreated, it may turn into a more serious form of widespread infection. Thrush is usually treated with antifungal medicine. The medicine is put directly in your mouth and throat. You may be given a swish and swallow medicine or an antifungal lozenge.  In some cases, you may need an antifungal pill. This can remove Candida throughout your body. Or you may need medicine through an IV. These treatments depend on how severe your infection is, and what other health conditions you have.  If you are at high risk for thrush,  you may need to keep taking oral antifungal medicine. This is to help prevent thrush in the future.  What happens if you dont get treated for thrush?  If untreated, the Candida may spread throughout your body. They may even enter your bloodstream. This can cause serious problems, such as organ failure and even death. Bloodstream infection may need to be treated with high doses of antifungal medicine through an IV.  Systemic infection is much more likely in people who are very ill. It is also more common in those who have serious problems with their immune system. Additional risk factors for systemic infection in very ill people include:  · Central venous lines  · IV nutrition  · Broad-spectrum antibiotics  · Kidney failure  · Recent surgery  Preventing thrush  You may be able to help prevent some cases of thrush. Make sure to:  · Practice good oral hygiene. Try using a chlorhexidine mouthwash.  · Clean your dentures regularly as instructed. Make sure they fit you correctly.  · After using a corticosteroid inhaler, rinse out your mouth with water or mouthwash.  · Do not use broad-spectrum antibiotics, if possible.  · Get treated for health problems that increase your risk for thrush, such as diabetes.     When to call the health care provider  Call your health care provider right away if you have any of these:  · Cottony feeling in your mouth  · Loss of taste  · Pain while eating or swallowing  · White or red patches inside your mouth or on the back of your throat   Date Last Reviewed: 3/19/2015  © 5306-3577 [employer]. [address] [place] [address], [place] [address]. All rights reserved. This information is not intended as a substitute for professional medical care. Always follow your healthcare professional's instructions.      If mouth symptoms dont improve in next week or so then recommend you see an ENT

## 2020-11-27 NOTE — PROGRESS NOTES
"Subjective:       Patient ID: Shanae Vo is a 35 y.o. female.    Vitals:  height is 5' 1" (1.549 m) and weight is 79.4 kg (175 lb). Her temperature is 97.5 °F (36.4 °C). Her blood pressure is 125/81 and her pulse is 70. Her respiration is 16 and oxygen saturation is 99%.     Chief Complaint: Toe Pain (right third toe infection )    Pt c/o right third toe infection that seems to be getting better but would like someone to look at it, pt also c/o white spots to the left side of throat that she's had for a couple of weeks , also complaining of a "burning" sensation of her gums and the inside of her cheeks for weeks, no pain with swallowing etc. No fever etc. she did have a  7 weeks ago (baby is in NICU) she has yeast infection of her nipples and is on diflucan orally for this.     Toe Pain   Incident onset: weeks. The incident occurred at home. There was no injury mechanism. The pain is present in the right foot. The pain is at a severity of 0/10. The patient is experiencing no pain. She reports no foreign bodies present. She has tried nothing for the symptoms.       Constitution: Negative for chills, fatigue and fever.   HENT: Positive for sore throat. Negative for congestion.    Neck: Negative for painful lymph nodes.   Cardiovascular: Negative for chest pain and leg swelling.   Eyes: Negative for double vision and blurred vision.   Respiratory: Negative for cough and shortness of breath.    Gastrointestinal: Negative for nausea, vomiting and diarrhea.   Genitourinary: Negative for dysuria, frequency, urgency and history of kidney stones.   Musculoskeletal: Negative for joint pain, joint swelling, muscle cramps and muscle ache.   Skin: Negative for color change, pale, rash and bruising.   Allergic/Immunologic: Negative for seasonal allergies.   Neurological: Negative for dizziness, history of vertigo, light-headedness, passing out and headaches.   Hematologic/Lymphatic: Negative for swollen lymph " nodes.   Psychiatric/Behavioral: Negative for nervous/anxious, sleep disturbance and depression. The patient is not nervous/anxious.        Objective:      Physical Exam   HENT:   Head: Normocephalic and atraumatic.   Ears:   Right Ear: Tympanic membrane normal.   Left Ear: Tympanic membrane normal.   Nose: Nose normal.   Mouth/Throat: Mucous membranes are dry.      Comments: Small 3-4 mm tonsillith on left inferior tonsil, no other exudate seen, geographic tongue, mild erythema of mucosa of  Inner cheeks  Neck: Normal range of motion. No muscular tenderness present. Carotid bruit is not present. No neck rigidity.   Abdominal: Normal appearance.   Lymphadenopathy:     She has no cervical adenopathy.   Neurological: She is alert.   Skin: Skin is warm and dry.         Comments: Resolving ingrown toenail infection of rt foot 3rd toe. No active infection.    Nursing note and vitals reviewed.        Assessment:       1. Thrush    2. Burning sensation of mouth    3. Tonsillith    4. Yeast dermatitis    5. Ingrown toenail of right foot        Plan:         Thrush  -     clotrimazole (MYCELEX) 10 mg gene; Take 1 tablet (10 mg total) by mouth 5 (five) times daily.  Dispense: 150 tablet; Refill: 0    Burning sensation of mouth  -     Vitamin B12  -     Ambulatory referral/consult to ENT    Tonsillith    Yeast dermatitis    Ingrown toenail of right foot      Patient Instructions       Candida Skin Infection (Adult)  Candida is type of yeast. It grows naturally on the skin and in the mouth. If it grows out of control, it can cause an infection. Candida can cause infections in the genital area, skin folds, in the mouth, and under the breasts. Anyone can get this infection. It is more common in a person with a weak immune system, such as from diabetes, HIV, or cancer. Its also more common in someone who has been on antibiotic therapy. And its more common people who are overweight or who have incontinence. Wearing  tight-fitting clothing and taking part in activities with lots of skin-to-skin contact can also put you at risk.  Candida causes the skin to become bright red and inflamed. The border of the infected part of the skin is often raised. The infection causes pain and itching. Sometimes the skin peels and bleeds. In the mouth, candida is called thrush, and may cause white thickened areas.  A Candida rash is most often treated with an antifungal cream or ointment. The rash will clear a few days after starting the medicine. Infections that dont go away may need a prescription medicine. In rare cases, a bacterial infection can also occur.  Home care  Your healthcare provider will recommend an antifungal cream or ointment for the rash. He or she may also prescribe a medicine for the itch. Follow all instructions for using these medicines. Dont use cornstarch powder. Cornstarch can cause the Candida infection to get worse.  General care:  · Keep your skin clean by washing the area twice a day.  · Use the cream as directed until your rash is gone. Once the skin has healed, keep it dry to prevent another infection.   · If you are overweight, talk with your healthcare provider about a plan to lose excess weight.  · Avoid clothes that fit tightly.  Follow-up care  Follow up with your healthcare provider, or as advised. Your rash will clear in 7 to 14 days. Call your healthcare provider if the rash is not gone after 14 days.  When to seek medical advice  Call your healthcare provider right away if any of these occur:  · Pain or redness that gets worse or spreads  · Fluid coming from the skin  · Yellow crusts on the skin  · Fever of 100.4°F (38°C) or higher, or as directed by your healthcare provider  Date Last Reviewed: 9/1/2016  © 3282-3947 [employer]. [address] [place] [address], [place] [address]. All rights reserved. This information is not intended as a substitute for professional medical care. Always follow your  healthcare professional's instructions.        Paronychia of the Finger or Toe  Paronychia is an infection near a fingernail or toenail. It usually occurs when an opening in the cuticle or an ingrown toenail lets bacteria under the skin.  The infection will need to be drained if pus is present. If the infection has been caught early, you may need only antibiotic treatment. Healing will take about 1 to 2 weeks.  Home care  Follow these guidelines when caring for yourself at home:  · Clean and soak the toe or finger. Do this 2 times a day for the first 3 days. To do so:  ¨ Soak your foot or hand in a tub of warm water for 5 minutes. Or hold your toe or finger under a faucet of warm running water for 5 minutes.  ¨ Clean any crust away with soap and water using a cotton swab.  ¨ Put antibiotic ointment on the infected area.  · Change the dressing daily or any time it gets dirty.  · If you were given antibiotics, take them as directed until they are all gone.  · If your infection is on a toe, wear comfortable shoes with a lot of toe room. You can also wear open-toed sandals while your toe heals.  · You may use over-the-counter medicine (acetaminophen or ibuprofen to help with pain, unless another medicine was prescribed. If you have chronic liver or kidney disease, talk with your healthcare provider before using these medicines. Also talk with your provider if you've had a stomach ulcer or GI (gastrointestinal) bleeding.  Prevention  The following can prevent paronychia:  · Avoid cutting or playing with your cuticles at home.  · Don't bite your nails.  · Don't suck on your thumbs or fingers.  Follow-up care  Follow up with your healthcare provider, or as advised.  When to seek medical advice  Call your healthcare provider right away if any of these occur:  · Redness, pain, or swelling of the finger or toe gets worse  · Red streaks in the skin leading away from the wound  · Pus or fluid draining from the nail  area  · Fever of 100.4ºF (38ºC) or higher, or as directed by your provider  Date Last Reviewed: 8/1/2016 © 2000-2017 [employer]. [address] [place] [address], [place] [address]. All rights reserved. This information is not intended as a substitute for professional medical care. Always follow your healthcare professional's instructions.        Candida Infection: Thrush  Thrush is a type of fungal infection in the mouth and throat. Thrush does not usually affect healthy adults. It is more common in people with a weakened immune system. It is also more likely if you take antibiotics. Thrush is normally not contagious.  Understanding fungus in the mouth and throat  Your mouth and throat normally contain millions of tiny organisms. These include bacteria and yeasts. Many of these do not cause any problems. In fact, they may help fight disease.  Yeasts are a type of fungus. A type of yeast called Candida normally lives on your skin. It is also found on the membranes of your mouth and throat. Usually, this yeast grows only in small amounts and is harmless. But in some cases, Candida can grow out of control and cause thrush. Thrush is related to other kinds of Candida infections that can grow all over the body. Thrush refers to an infection of only the mouth and throat.  What causes thrush?  Thrush happens when something lets too much Candida grow inside your mouth and throat. Certain things that change the normal balance of organisms in the mouth can lead to thrush. One example is antibiotic medicine. This medicine may kill some of the normal bacteria in your mouth. Candida can then grow freely. People on antibiotics have an increased risk for thrush.  You have a higher risk for thrush if you:  · Wear dentures  · Are getting chemotherapy  · Are getting radiation therapy  · Have diabetes  · Have a transplanted organ  · Use corticosteroids  · Have a weakened immune system, such as from AIDS  · Are an older  adult  Symptoms of thrush  Some people with thrush do not have any symptoms. Symptoms of thrush can include:  · A dry, cottony feeling in your mouth  · Loss of taste  · Pain while eating or swallowing  · White or red patches inside your mouth or on the back of your throat  Diagnosing thrush  Your health care provider will ask about your medical history and your symptoms. He or she will look closely at your mouth and throat. White or red patches will be scraped with a tongue depressor. The sample will be sent to a lab to test. This test can usually confirm thrush.  If you have thrush, you may also have esophageal candidiasis. This is common in people who have HIV. Your health care provider may check for this condition with an upper endoscopy. This is a procedure to look at the esophagus. A tissue sample may be taken to test.  Treatment for thrush  It is important to treat thrush early. Untreated, it may turn into a more serious form of widespread infection. Thrush is usually treated with antifungal medicine. The medicine is put directly in your mouth and throat. You may be given a swish and swallow medicine or an antifungal lozenge.  In some cases, you may need an antifungal pill. This can remove Candida throughout your body. Or you may need medicine through an IV. These treatments depend on how severe your infection is, and what other health conditions you have.  If you are at high risk for thrush, you may need to keep taking oral antifungal medicine. This is to help prevent thrush in the future.  What happens if you dont get treated for thrush?  If untreated, the Candida may spread throughout your body. They may even enter your bloodstream. This can cause serious problems, such as organ failure and even death. Bloodstream infection may need to be treated with high doses of antifungal medicine through an IV.  Systemic infection is much more likely in people who are very ill. It is also more common in those who  have serious problems with their immune system. Additional risk factors for systemic infection in very ill people include:  · Central venous lines  · IV nutrition  · Broad-spectrum antibiotics  · Kidney failure  · Recent surgery  Preventing thrush  You may be able to help prevent some cases of thrush. Make sure to:  · Practice good oral hygiene. Try using a chlorhexidine mouthwash.  · Clean your dentures regularly as instructed. Make sure they fit you correctly.  · After using a corticosteroid inhaler, rinse out your mouth with water or mouthwash.  · Do not use broad-spectrum antibiotics, if possible.  · Get treated for health problems that increase your risk for thrush, such as diabetes.     When to call the health care provider  Call your health care provider right away if you have any of these:  · Cottony feeling in your mouth  · Loss of taste  · Pain while eating or swallowing  · White or red patches inside your mouth or on the back of your throat   Date Last Reviewed: 3/19/2015  © 9809-2038 [employer]. [address] [place] [address], [place] [address]. All rights reserved. This information is not intended as a substitute for professional medical care. Always follow your healthcare professional's instructions.      If mouth symptoms dont improve in next week or so then recommend you see an ENT

## 2020-11-28 ENCOUNTER — TELEPHONE (OUTPATIENT)
Dept: URGENT CARE | Facility: CLINIC | Age: 35
End: 2020-11-28

## 2020-11-28 NOTE — TELEPHONE ENCOUNTER
No answer. Left message on answering machine for pt to call back . Will try again later if we don't hear from them

## 2020-11-30 ENCOUNTER — PATIENT MESSAGE (OUTPATIENT)
Dept: OBSTETRICS AND GYNECOLOGY | Facility: CLINIC | Age: 35
End: 2020-11-30

## 2020-11-30 DIAGNOSIS — B37.89 CANDIDIASIS OF BREAST: Primary | ICD-10-CM

## 2020-12-02 RX ORDER — FLUCONAZOLE 150 MG/1
150 TABLET ORAL ONCE
Qty: 7 TABLET | Refills: 0 | Status: CANCELLED | OUTPATIENT
Start: 2020-12-02 | End: 2020-12-02

## 2020-12-02 RX ORDER — FLUCONAZOLE 200 MG/1
200 TABLET ORAL DAILY
Qty: 7 TABLET | Refills: 1 | Status: SHIPPED | OUTPATIENT
Start: 2020-12-02 | End: 2020-12-09

## 2020-12-04 ENCOUNTER — OFFICE VISIT (OUTPATIENT)
Dept: OBSTETRICS AND GYNECOLOGY | Facility: CLINIC | Age: 35
End: 2020-12-04
Payer: OTHER GOVERNMENT

## 2020-12-04 VITALS
BODY MASS INDEX: 33.72 KG/M2 | SYSTOLIC BLOOD PRESSURE: 118 MMHG | DIASTOLIC BLOOD PRESSURE: 78 MMHG | WEIGHT: 178.44 LBS

## 2020-12-04 DIAGNOSIS — N76.0 ACUTE VAGINITIS: Primary | ICD-10-CM

## 2020-12-04 PROCEDURE — 87510 GARDNER VAG DNA DIR PROBE: CPT

## 2020-12-04 PROCEDURE — 0502F SUBSEQUENT PRENATAL CARE: CPT | Mod: S$PBB,,, | Performed by: PHYSICIAN ASSISTANT

## 2020-12-04 PROCEDURE — 99213 OFFICE O/P EST LOW 20 MIN: CPT | Mod: PBBFAC | Performed by: PHYSICIAN ASSISTANT

## 2020-12-04 PROCEDURE — 87480 CANDIDA DNA DIR PROBE: CPT

## 2020-12-04 PROCEDURE — 99999 PR PBB SHADOW E&M-EST. PATIENT-LVL III: CPT | Mod: PBBFAC,,, | Performed by: PHYSICIAN ASSISTANT

## 2020-12-04 PROCEDURE — 99999 PR PBB SHADOW E&M-EST. PATIENT-LVL III: ICD-10-PCS | Mod: PBBFAC,,, | Performed by: PHYSICIAN ASSISTANT

## 2020-12-04 PROCEDURE — 0502F PR SUBSEQUENT PRENATAL CARE: ICD-10-PCS | Mod: S$PBB,,, | Performed by: PHYSICIAN ASSISTANT

## 2020-12-04 RX ORDER — METRONIDAZOLE 7.5 MG/G
1 GEL VAGINAL DAILY
Qty: 70 G | Refills: 0 | Status: SHIPPED | OUTPATIENT
Start: 2020-12-04 | End: 2020-12-09

## 2020-12-04 NOTE — PROGRESS NOTES
Shanae Vo is a 35 y.o. female  presents with complaint of vaginal irritation for 1 week.  She reports intermittent itching, burning, irritation, increased odor.  Denies nausea, vomiting, diarrhea, constipation, dysuria, abdominal pain. Pt is currently taking Diflucan for breast yeast infection. Her baby, Ti, is in the NICU-she is very stressed, staying in the hotel across the street, about to move to the city as her  was relocated. No other concerns or complaints at this visit.     Past Medical History:   Diagnosis Date    Finger infection     right finger- patient will go to urgent care and take care of it    Thyroid disease     hypothyroid     Past Surgical History:   Procedure Laterality Date     SECTION N/A 10/12/2020    Procedure:  SECTION;  Surgeon: Harmony Singh MD;  Location: [facility] L&D;  Service: OB/GYN;  Laterality: N/A;    DIAGNOSTIC LAPAROSCOPY N/A 10/28/2019    Procedure: LAPAROSCOPY, DIAGNOSTIC;  Surgeon: Luis Armando Vega MD;  Location: [facility] OR;  Service: OB/GYN;  Laterality: N/A;    LAPAROSCOPIC SURGICAL REMOVAL OF CYST OF OVARY Left 10/28/2019    Procedure: EXCISION, CYST, OVARY, LAPAROSCOPIC, LEFT;  Surgeon: Luis Armando Vega MD;  Location: [facility] OR;  Service: OB/GYN;  Laterality: Left;    WISDOM TOOTH EXTRACTION       Social History     Tobacco Use    Smoking status: Never Smoker    Smokeless tobacco: Never Used   Substance Use Topics    Alcohol use: Not Currently     Comment: social    Drug use: Never     Family History   Problem Relation Age of Onset    Colon cancer Paternal Uncle     Breast cancer Neg Hx     Ovarian cancer Neg Hx      OB History    Para Term  AB Living   1 1   1       SAB TAB Ectopic Multiple Live Births         0        # Outcome Date GA Lbr Judson/2nd Weight Sex Delivery Anes PTL Lv   1  10/09/20 31w1d   M CS-LTranv EPI         /78   Wt 81 kg (178 lb 7.4 oz)   LMP 2020   BMI  33.72 kg/m²     ROS:  GENERAL: No fever, chills, fatigability or weight loss.  VULVAR: No pain, no lesions and no itching.  VAGINAL: No relaxation, no discharge, no abnormal bleeding and no lesions. +itch, irritation  ABDOMEN: No abdominal pain. Denies nausea. Denies vomiting. No diarrhea. No constipation  BREAST: Denies pain. No lumps. No discharge.  URINARY: No incontinence, no nocturia, no frequency and no dysuria.  CARDIOVASCULAR: No chest pain. No shortness of breath. No leg cramps.  NEUROLOGICAL: No headaches. No vision changes.    PHYSICAL EXAM:  VULVA: normal appearing vulva with no masses, tenderness or lesions   VAGINA: normal appearing vagina with normal color. THIN, WHITE/GRAY discharge, no lesions   CERVIX: normal appearing cervix without discharge or lesions   UTERUS: uterus is normal size, shape, consistency and nontender   ADNEXA: normal adnexa in size, nontender and no masses    ASSESSMENT and PLAN:    ICD-10-CM ICD-9-CM    1. Acute vaginitis  N76.0 616.10 Vaginosis Screen by DNA Probe      metroNIDAZOLE (METROGEL VAGINAL) 0.75 % vaginal gel       Vaginitis Prevention:  - Avoiding feminine products such as deoderant soaps, body wash, bubble bath, douches, scented toilet paper, deoderant tampons or pads, feminine wipes, chronic pad use, etc. If you wash with soap make sure its hypo allergic (free of added scents or colors)   - Avoiding other vulvovaginal irritants such as long hot baths, humidity, tight, synthetic clothing, chlorine and sitting around in wet bathing suits  - Wearing cotton underwear, avoiding thong underwear and no underwear to bed-wear loose cotton bottoms to bed   - Taking showers instead of baths and use a hair dryer on cool setting afterwards to dry  - Wearing cotton to exercise and shower immediately after exercise and change clothes  - Using polyurethane condoms without spermicide if sexually active and symptoms are triggered by intercourse  - Use laundry detergent without  added perfumes or colors   - Do not have sexual intercourse until symptoms have gone away   - Increase your intake of probiotics--you can get these by eating yogurt/greek yogurt or by buying over the counter probiotic supplements     Probiotic Information:   Any probiotic you choose needs to have ALL of the following components (Each needs to be >10 colony forming units [CFUs]):   - L. Acidophilus  - L. Rhamnosus  - L. Fermentum       FOLLOW UP: PRN lack of improvement.

## 2020-12-05 LAB
CANDIDA RRNA VAG QL PROBE: NEGATIVE
G VAGINALIS RRNA GENITAL QL PROBE: POSITIVE
T VAGINALIS RRNA GENITAL QL PROBE: NEGATIVE

## 2020-12-08 ENCOUNTER — PATIENT MESSAGE (OUTPATIENT)
Dept: OBSTETRICS AND GYNECOLOGY | Facility: CLINIC | Age: 35
End: 2020-12-08

## 2020-12-09 ENCOUNTER — PATIENT MESSAGE (OUTPATIENT)
Dept: OBSTETRICS AND GYNECOLOGY | Facility: CLINIC | Age: 35
End: 2020-12-09

## 2020-12-09 RX ORDER — MUPIROCIN 20 MG/G
OINTMENT TOPICAL
Qty: 30 G | Refills: 2 | Status: SHIPPED | OUTPATIENT
Start: 2020-12-09 | End: 2021-02-18

## 2020-12-09 RX ORDER — METRONIDAZOLE 500 MG/1
500 TABLET ORAL 2 TIMES DAILY
Qty: 14 TABLET | Refills: 0 | Status: SHIPPED | OUTPATIENT
Start: 2020-12-09 | End: 2020-12-16

## 2020-12-15 ENCOUNTER — POSTPARTUM VISIT (OUTPATIENT)
Dept: OBSTETRICS AND GYNECOLOGY | Facility: CLINIC | Age: 35
End: 2020-12-15
Payer: OTHER GOVERNMENT

## 2020-12-15 VITALS
DIASTOLIC BLOOD PRESSURE: 76 MMHG | SYSTOLIC BLOOD PRESSURE: 130 MMHG | HEIGHT: 61 IN | WEIGHT: 178.56 LBS | BODY MASS INDEX: 33.71 KG/M2

## 2020-12-15 DIAGNOSIS — Z98.891 S/P CESAREAN SECTION: Primary | ICD-10-CM

## 2020-12-15 DIAGNOSIS — O14.93 PRE-ECLAMPSIA IN THIRD TRIMESTER: ICD-10-CM

## 2020-12-15 DIAGNOSIS — B37.89 CANDIDIASIS OF BREAST: ICD-10-CM

## 2020-12-15 PROCEDURE — 0503F PR POSTPARTUM CARE VISIT: ICD-10-PCS | Mod: ,,, | Performed by: OBSTETRICS & GYNECOLOGY

## 2020-12-15 PROCEDURE — 0503F POSTPARTUM CARE VISIT: CPT | Mod: ,,, | Performed by: OBSTETRICS & GYNECOLOGY

## 2020-12-15 PROCEDURE — 99999 PR PBB SHADOW E&M-EST. PATIENT-LVL III: CPT | Mod: PBBFAC,,, | Performed by: OBSTETRICS & GYNECOLOGY

## 2020-12-15 PROCEDURE — 99999 PR PBB SHADOW E&M-EST. PATIENT-LVL III: ICD-10-PCS | Mod: PBBFAC,,, | Performed by: OBSTETRICS & GYNECOLOGY

## 2020-12-15 PROCEDURE — 99213 OFFICE O/P EST LOW 20 MIN: CPT | Mod: PBBFAC | Performed by: OBSTETRICS & GYNECOLOGY

## 2020-12-15 NOTE — PROGRESS NOTES
"CC: Post-partum follow-up    Shanae Vo is a 35 y.o. female  presents for post-partum visit s/p a , due to Preeclampsia with severe features at 31w1d, NRFHT and hand presentation.    Delivery Date: 10/12/20  Delivery MD: Harmony Singh MD  Gender: male  Birth Weight:1200g  Breast Feeding: YES, pumping for infant in NICU  Depression: NO  Contraception: no method    Pregnancy was complicated by:  Preeclampsia with severe features, cholestasis of pregnancy, NRFHT, groin abscess.   Pt is doing well today.  Has been weaned off of BP meds.  BP normal without meds.  No PreE symptoms.  Pt also reports that previous breast candidiasis has resolved with medication.  Infant in NICU was treated as well.     /76   Ht 5' 1" (1.549 m)   Wt 81 kg (178 lb 9.2 oz)   LMP 2020   Breastfeeding Yes   BMI 33.74 kg/m²     ROS:  GENERAL: No fever, chills, fatigability or weight loss.  VULVAR: No pain, no lesions and no itching.  VAGINAL: No relaxation, no itching, no discharge, no abnormal bleeding and no lesions.  ABDOMEN: No abdominal pain. Denies nausea. Denies vomiting. No diarrhea. No constipation  BREAST: Denies pain. No lumps. No discharge.  URINARY: No incontinence, no nocturia, no frequency and no dysuria.  CARDIOVASCULAR: No chest pain. No shortness of breath. No leg cramps.  NEUROLOGICAL: No headaches. No vision changes.    PHYSICAL EXAM:  PELVIC: A&O x3  ABDOMEN: pfannenstiel incision well healed.  No signs of infection, dehiscence, bleeding or discharge.  Rest of exam deferred.         PROCEDURES:  - none        Diagnosis:  1. S/P  section    2. Pre-eclampsia in third trimester    3. Candidiasis of breast        Plan:     Shanae was seen today for postpartum care.    Diagnoses and all orders for this visit:    S/P  section  - Well healed.  Not having any issues  - Ok to resume normal acitivity    Pre-eclampsia in third trimester  - Resolved.  BP controlled on no meds  - " No PreE symptoms    Candidiasis of breast  - Resolved  - Continue breastfeeding    No orders of the defined types were placed in this encounter.      Follow up in about 1 year (around 12/15/2021) for annual.      Harmony Singh MD  Obstetrics & Gynecology

## 2020-12-22 ENCOUNTER — OFFICE VISIT (OUTPATIENT)
Dept: OBSTETRICS AND GYNECOLOGY | Facility: CLINIC | Age: 35
End: 2020-12-22
Payer: OTHER GOVERNMENT

## 2020-12-22 VITALS
BODY MASS INDEX: 32.67 KG/M2 | DIASTOLIC BLOOD PRESSURE: 74 MMHG | WEIGHT: 173.06 LBS | HEIGHT: 61 IN | SYSTOLIC BLOOD PRESSURE: 115 MMHG

## 2020-12-22 DIAGNOSIS — N76.0 ACUTE VAGINITIS: Primary | ICD-10-CM

## 2020-12-22 PROBLEM — O36.8390 FETAL HEART RATE NON-REASSURING AFFECTING MANAGEMENT OF MOTHER: Status: RESOLVED | Noted: 2020-09-30 | Resolved: 2020-12-22

## 2020-12-22 PROBLEM — Z67.91 RH NEGATIVE STATE IN ANTEPARTUM PERIOD: Status: RESOLVED | Noted: 2020-10-01 | Resolved: 2020-12-22

## 2020-12-22 PROBLEM — Z87.59 HISTORY OF CHOLESTASIS DURING PREGNANCY: Status: ACTIVE | Noted: 2020-09-30

## 2020-12-22 PROBLEM — Z87.19 HISTORY OF CHOLESTASIS DURING PREGNANCY: Status: ACTIVE | Noted: 2020-09-30

## 2020-12-22 PROBLEM — Z87.59 HISTORY OF PRE-ECLAMPSIA: Status: ACTIVE | Noted: 2020-09-30

## 2020-12-22 PROBLEM — O26.899 RH NEGATIVE STATE IN ANTEPARTUM PERIOD: Status: RESOLVED | Noted: 2020-10-01 | Resolved: 2020-12-22

## 2020-12-22 PROCEDURE — 0502F PR SUBSEQUENT PRENATAL CARE: ICD-10-PCS | Mod: S$PBB,,, | Performed by: NURSE PRACTITIONER

## 2020-12-22 PROCEDURE — 99999 PR PBB SHADOW E&M-EST. PATIENT-LVL III: ICD-10-PCS | Mod: PBBFAC,,, | Performed by: NURSE PRACTITIONER

## 2020-12-22 PROCEDURE — 0502F SUBSEQUENT PRENATAL CARE: CPT | Mod: S$PBB,,, | Performed by: NURSE PRACTITIONER

## 2020-12-22 PROCEDURE — 87510 GARDNER VAG DNA DIR PROBE: CPT

## 2020-12-22 PROCEDURE — 99999 PR PBB SHADOW E&M-EST. PATIENT-LVL III: CPT | Mod: PBBFAC,,, | Performed by: NURSE PRACTITIONER

## 2020-12-22 PROCEDURE — 99213 OFFICE O/P EST LOW 20 MIN: CPT | Mod: PBBFAC | Performed by: NURSE PRACTITIONER

## 2020-12-22 PROCEDURE — 87480 CANDIDA DNA DIR PROBE: CPT

## 2020-12-22 NOTE — PROGRESS NOTES
Shanae Vo is a 35 y.o. female  presents with complaint of vaginal discharge. New patient to me-sees Dr. Singh. Delivered on 10/12/20. Tested positive for BV on 20, treated with Flagyl. She is breast feeding-baby just got discharged from the NICU. C/o increased discharge with a foul odor. No itching.     Delivery Date: 10/12/20  Delivery MD: Harmony Singh MD  Gender: male  Birth Weight:1200g  Breast Feeding: YES, pumping for infant in NICU  Depression: NO  Contraception: no method    Past Medical History:   Diagnosis Date    Finger infection     right finger- patient will go to urgent care and take care of it    Thyroid disease     hypothyroid     Past Surgical History:   Procedure Laterality Date     SECTION N/A 10/12/2020    Procedure:  SECTION;  Surgeon: Harmony Singh MD;  Location: [facility] L&D;  Service: OB/GYN;  Laterality: N/A;     SECTION      DIAGNOSTIC LAPAROSCOPY N/A 10/28/2019    Procedure: LAPAROSCOPY, DIAGNOSTIC;  Surgeon: Luis Armando Vega MD;  Location: [facility] OR;  Service: OB/GYN;  Laterality: N/A;    LAPAROSCOPIC SURGICAL REMOVAL OF CYST OF OVARY Left 10/28/2019    Procedure: EXCISION, CYST, OVARY, LAPAROSCOPIC, LEFT;  Surgeon: Luis Armando Vega MD;  Location: [facility] OR;  Service: OB/GYN;  Laterality: Left;    WISDOM TOOTH EXTRACTION       Social History     Tobacco Use    Smoking status: Never Smoker    Smokeless tobacco: Never Used   Substance Use Topics    Alcohol use: Not Currently     Comment: social    Drug use: Never     Family History   Problem Relation Age of Onset    Colon cancer Paternal Uncle     Breast cancer Neg Hx     Ovarian cancer Neg Hx      OB History    Para Term  AB Living   1 1   1       SAB TAB Ectopic Multiple Live Births         0        # Outcome Date GA Lbr Judson/2nd Weight Sex Delivery Anes PTL Lv   1  10/09/20 31w1d   M CS-LTranv EPI         /74 (BP Location: Right arm, Patient  "Position: Sitting)   Ht 5' 1" (1.549 m)   Wt 78.5 kg (173 lb 1 oz)   LMP 03/05/2020   BMI 32.70 kg/m²     ROS:  GENERAL: No fever, chills, fatigability or weight loss.  VULVAR: No pain, no lesions and no itching.  VAGINAL: No relaxation, no itching, + discharge, no abnormal bleeding and no lesions.  ABDOMEN: No abdominal pain. Denies nausea. Denies vomiting. No diarrhea. No constipation  BREAST: Denies pain. No lumps. No discharge.  URINARY: No incontinence, no nocturia, no frequency and no dysuria.  CARDIOVASCULAR: No chest pain. No shortness of breath. No leg cramps.  NEUROLOGICAL: No headaches. No vision changes.    PHYSICAL EXAM:  VULVA: normal appearing vulva with no masses, tenderness or lesions   VAGINA: normal appearing vagina with normal color. Thin white milky discharge, no lesions   CERVIX: normal appearing cervix without discharge or lesions   UTERUS: uterus is normal size, shape, consistency and nontender   ADNEXA: normal adnexa in size, nontender and no masses    ASSESSMENT and PLAN:    ICD-10-CM ICD-9-CM    1. Acute vaginitis  N76.0 616.10 Vaginosis Screen by DNA Probe      boric acid (BORIC ACID) vaginal suppository     1. Affirm pending, presumed BV today. Rx BA to [facility]    Patient was counseled today on vaginitis prevention including :  a. avoiding feminine products such as deoderant soaps, body wash, bubble bath, douches, scented toilet paper, deoderant tampons or pads, feminine wipes, chronic pad use, etc.  b. avoiding other vulvovaginal irritants such as long hot baths, humidity, tight, synthetic clothing, chlorine and sitting around in wet bathing suits  c. wearing cotton underwear, avoiding thong underwear and no underwear to bed  d. taking showers instead of baths and use a hair dryer on cool setting afterwards to dry  e. wearing cotton to exercise and shower immediately after exercise and change clothes  f. using polyurethane condoms without spermicide if sexually active and symptoms " are triggered by intercourse    FOLLOW UP: PRN lack of improvement.

## 2020-12-23 ENCOUNTER — PATIENT MESSAGE (OUTPATIENT)
Dept: OBSTETRICS AND GYNECOLOGY | Facility: CLINIC | Age: 35
End: 2020-12-23

## 2020-12-23 LAB
CANDIDA RRNA VAG QL PROBE: NEGATIVE
G VAGINALIS RRNA GENITAL QL PROBE: NEGATIVE
T VAGINALIS RRNA GENITAL QL PROBE: NEGATIVE

## 2021-02-17 ENCOUNTER — PATIENT MESSAGE (OUTPATIENT)
Dept: OTOLARYNGOLOGY | Facility: CLINIC | Age: 36
End: 2021-02-17

## 2021-02-17 ENCOUNTER — TELEPHONE (OUTPATIENT)
Dept: FAMILY MEDICINE | Facility: CLINIC | Age: 36
End: 2021-02-17

## 2021-02-18 ENCOUNTER — OFFICE VISIT (OUTPATIENT)
Dept: FAMILY MEDICINE | Facility: CLINIC | Age: 36
End: 2021-02-18
Payer: OTHER GOVERNMENT

## 2021-02-18 VITALS
OXYGEN SATURATION: 99 % | SYSTOLIC BLOOD PRESSURE: 110 MMHG | HEART RATE: 83 BPM | RESPIRATION RATE: 18 BRPM | BODY MASS INDEX: 33.96 KG/M2 | HEIGHT: 61 IN | WEIGHT: 179.88 LBS | DIASTOLIC BLOOD PRESSURE: 70 MMHG

## 2021-02-18 DIAGNOSIS — Z12.4 CERVICAL CANCER SCREENING: ICD-10-CM

## 2021-02-18 DIAGNOSIS — E66.9 OBESITY (BMI 30-39.9): ICD-10-CM

## 2021-02-18 DIAGNOSIS — E03.9 HYPOTHYROIDISM, UNSPECIFIED TYPE: ICD-10-CM

## 2021-02-18 DIAGNOSIS — Z00.00 ANNUAL PHYSICAL EXAM: Primary | ICD-10-CM

## 2021-02-18 PROCEDURE — 99395 PR PREVENTIVE VISIT,EST,18-39: ICD-10-PCS | Mod: S$PBB,,, | Performed by: FAMILY MEDICINE

## 2021-02-18 PROCEDURE — 99395 PREV VISIT EST AGE 18-39: CPT | Mod: S$PBB,,, | Performed by: FAMILY MEDICINE

## 2021-02-18 PROCEDURE — 99999 PR PBB SHADOW E&M-EST. PATIENT-LVL IV: ICD-10-PCS | Mod: PBBFAC,,, | Performed by: FAMILY MEDICINE

## 2021-02-18 PROCEDURE — 99214 OFFICE O/P EST MOD 30 MIN: CPT | Mod: PBBFAC,PO | Performed by: FAMILY MEDICINE

## 2021-02-18 PROCEDURE — 99999 PR PBB SHADOW E&M-EST. PATIENT-LVL IV: CPT | Mod: PBBFAC,,, | Performed by: FAMILY MEDICINE

## 2021-02-26 ENCOUNTER — TELEPHONE (OUTPATIENT)
Dept: FAMILY MEDICINE | Facility: CLINIC | Age: 36
End: 2021-02-26

## 2021-02-26 ENCOUNTER — PATIENT MESSAGE (OUTPATIENT)
Dept: FAMILY MEDICINE | Facility: CLINIC | Age: 36
End: 2021-02-26

## 2021-02-26 ENCOUNTER — LAB VISIT (OUTPATIENT)
Dept: LAB | Facility: HOSPITAL | Age: 36
End: 2021-02-26
Attending: FAMILY MEDICINE
Payer: OTHER GOVERNMENT

## 2021-02-26 DIAGNOSIS — Z00.00 ANNUAL PHYSICAL EXAM: ICD-10-CM

## 2021-02-26 DIAGNOSIS — E03.9 HYPOTHYROIDISM, UNSPECIFIED TYPE: ICD-10-CM

## 2021-02-26 LAB
ALBUMIN SERPL BCP-MCNC: 4 G/DL (ref 3.5–5.2)
ALP SERPL-CCNC: 373 U/L (ref 55–135)
ALT SERPL W/O P-5'-P-CCNC: 218 U/L (ref 10–44)
ANION GAP SERPL CALC-SCNC: 11 MMOL/L (ref 8–16)
AST SERPL-CCNC: 117 U/L (ref 10–40)
BILIRUB SERPL-MCNC: 0.4 MG/DL (ref 0.1–1)
BUN SERPL-MCNC: 11 MG/DL (ref 6–20)
CALCIUM SERPL-MCNC: 9.7 MG/DL (ref 8.7–10.5)
CHLORIDE SERPL-SCNC: 105 MMOL/L (ref 95–110)
CHOLEST SERPL-MCNC: 257 MG/DL (ref 120–199)
CHOLEST/HDLC SERPL: 4.3 {RATIO} (ref 2–5)
CO2 SERPL-SCNC: 23 MMOL/L (ref 23–29)
CREAT SERPL-MCNC: 0.7 MG/DL (ref 0.5–1.4)
ERYTHROCYTE [DISTWIDTH] IN BLOOD BY AUTOMATED COUNT: 12.3 % (ref 11.5–14.5)
EST. GFR  (AFRICAN AMERICAN): >60 ML/MIN/1.73 M^2
EST. GFR  (NON AFRICAN AMERICAN): >60 ML/MIN/1.73 M^2
GLUCOSE SERPL-MCNC: 87 MG/DL (ref 70–110)
HCT VFR BLD AUTO: 41.4 % (ref 37–48.5)
HDLC SERPL-MCNC: 60 MG/DL (ref 40–75)
HDLC SERPL: 23.3 % (ref 20–50)
HGB BLD-MCNC: 13.9 G/DL (ref 12–16)
LDLC SERPL CALC-MCNC: 178.4 MG/DL (ref 63–159)
MCH RBC QN AUTO: 29.6 PG (ref 27–31)
MCHC RBC AUTO-ENTMCNC: 33.6 G/DL (ref 32–36)
MCV RBC AUTO: 88 FL (ref 82–98)
NONHDLC SERPL-MCNC: 197 MG/DL
PLATELET # BLD AUTO: 332 K/UL (ref 150–350)
PMV BLD AUTO: 9.9 FL (ref 9.2–12.9)
POTASSIUM SERPL-SCNC: 4.2 MMOL/L (ref 3.5–5.1)
PROT SERPL-MCNC: 7.8 G/DL (ref 6–8.4)
RBC # BLD AUTO: 4.7 M/UL (ref 4–5.4)
SODIUM SERPL-SCNC: 139 MMOL/L (ref 136–145)
T4 FREE SERPL-MCNC: 1.04 NG/DL (ref 0.71–1.51)
TRIGL SERPL-MCNC: 93 MG/DL (ref 30–150)
TSH SERPL DL<=0.005 MIU/L-ACNC: 2.03 UIU/ML (ref 0.4–4)
WBC # BLD AUTO: 6.65 K/UL (ref 3.9–12.7)

## 2021-02-26 PROCEDURE — 83036 HEMOGLOBIN GLYCOSYLATED A1C: CPT

## 2021-02-26 PROCEDURE — 84439 ASSAY OF FREE THYROXINE: CPT

## 2021-02-26 PROCEDURE — 84443 ASSAY THYROID STIM HORMONE: CPT

## 2021-02-26 PROCEDURE — 85027 COMPLETE CBC AUTOMATED: CPT

## 2021-02-26 PROCEDURE — 80053 COMPREHEN METABOLIC PANEL: CPT

## 2021-02-26 PROCEDURE — 36415 COLL VENOUS BLD VENIPUNCTURE: CPT | Mod: PO

## 2021-02-26 PROCEDURE — 84481 FREE ASSAY (FT-3): CPT

## 2021-02-26 PROCEDURE — 80061 LIPID PANEL: CPT

## 2021-02-27 LAB
ESTIMATED AVG GLUCOSE: 97 MG/DL (ref 68–131)
HBA1C MFR BLD: 5 % (ref 4–5.6)
T3FREE SERPL-MCNC: 2.6 PG/ML (ref 2.3–4.2)

## 2021-03-03 ENCOUNTER — OFFICE VISIT (OUTPATIENT)
Dept: HEPATOLOGY | Facility: CLINIC | Age: 36
End: 2021-03-03
Payer: OTHER GOVERNMENT

## 2021-03-03 DIAGNOSIS — Z87.59 HISTORY OF CHOLESTASIS DURING PREGNANCY: Primary | ICD-10-CM

## 2021-03-03 DIAGNOSIS — R74.8 ELEVATED LIVER ENZYMES: ICD-10-CM

## 2021-03-03 DIAGNOSIS — Z87.19 HISTORY OF CHOLESTASIS DURING PREGNANCY: Primary | ICD-10-CM

## 2021-03-03 PROCEDURE — 99214 OFFICE O/P EST MOD 30 MIN: CPT | Mod: 95,,, | Performed by: INTERNAL MEDICINE

## 2021-03-03 PROCEDURE — 99214 PR OFFICE/OUTPT VISIT, EST, LEVL IV, 30-39 MIN: ICD-10-PCS | Mod: 95,,, | Performed by: INTERNAL MEDICINE

## 2021-03-04 ENCOUNTER — TELEPHONE (OUTPATIENT)
Dept: HEPATOLOGY | Facility: CLINIC | Age: 36
End: 2021-03-04

## 2021-03-04 ENCOUNTER — PATIENT MESSAGE (OUTPATIENT)
Dept: HEPATOLOGY | Facility: CLINIC | Age: 36
End: 2021-03-04

## 2021-03-05 ENCOUNTER — PATIENT MESSAGE (OUTPATIENT)
Dept: HEPATOLOGY | Facility: CLINIC | Age: 36
End: 2021-03-05

## 2021-03-08 ENCOUNTER — LAB VISIT (OUTPATIENT)
Dept: LAB | Facility: HOSPITAL | Age: 36
End: 2021-03-08
Attending: INTERNAL MEDICINE
Payer: OTHER GOVERNMENT

## 2021-03-08 DIAGNOSIS — R74.8 ELEVATED LIVER ENZYMES: ICD-10-CM

## 2021-03-08 LAB
AFP SERPL-MCNC: 2.3 NG/ML (ref 0–8.4)
ALBUMIN SERPL BCP-MCNC: 4 G/DL (ref 3.5–5.2)
ALP SERPL-CCNC: 262 U/L (ref 55–135)
ALT SERPL W/O P-5'-P-CCNC: 69 U/L (ref 10–44)
ANION GAP SERPL CALC-SCNC: 10 MMOL/L (ref 8–16)
AST SERPL-CCNC: 40 U/L (ref 10–40)
BASOPHILS # BLD AUTO: 0.03 K/UL (ref 0–0.2)
BASOPHILS NFR BLD: 0.6 % (ref 0–1.9)
BILIRUB SERPL-MCNC: 0.4 MG/DL (ref 0.1–1)
BUN SERPL-MCNC: 11 MG/DL (ref 6–20)
CALCIUM SERPL-MCNC: 9.5 MG/DL (ref 8.7–10.5)
CHLORIDE SERPL-SCNC: 104 MMOL/L (ref 95–110)
CO2 SERPL-SCNC: 25 MMOL/L (ref 23–29)
CREAT SERPL-MCNC: 0.7 MG/DL (ref 0.5–1.4)
DIFFERENTIAL METHOD: NORMAL
EOSINOPHIL # BLD AUTO: 0 K/UL (ref 0–0.5)
EOSINOPHIL NFR BLD: 0.6 % (ref 0–8)
ERYTHROCYTE [DISTWIDTH] IN BLOOD BY AUTOMATED COUNT: 12.3 % (ref 11.5–14.5)
EST. GFR  (AFRICAN AMERICAN): >60 ML/MIN/1.73 M^2
EST. GFR  (NON AFRICAN AMERICAN): >60 ML/MIN/1.73 M^2
FERRITIN SERPL-MCNC: 21 NG/ML (ref 20–300)
GLUCOSE SERPL-MCNC: 84 MG/DL (ref 70–110)
HCT VFR BLD AUTO: 39.7 % (ref 37–48.5)
HGB BLD-MCNC: 13.5 G/DL (ref 12–16)
IGA SERPL-MCNC: 125 MG/DL (ref 40–350)
IGG SERPL-MCNC: 1034 MG/DL (ref 650–1600)
IMM GRANULOCYTES # BLD AUTO: 0 K/UL (ref 0–0.04)
IMM GRANULOCYTES NFR BLD AUTO: 0 % (ref 0–0.5)
INR PPP: 1 (ref 0.8–1.2)
IRON SERPL-MCNC: 57 UG/DL (ref 30–160)
LYMPHOCYTES # BLD AUTO: 1.5 K/UL (ref 1–4.8)
LYMPHOCYTES NFR BLD: 28.7 % (ref 18–48)
MCH RBC QN AUTO: 29.9 PG (ref 27–31)
MCHC RBC AUTO-ENTMCNC: 34 G/DL (ref 32–36)
MCV RBC AUTO: 88 FL (ref 82–98)
MONOCYTES # BLD AUTO: 0.5 K/UL (ref 0.3–1)
MONOCYTES NFR BLD: 9.9 % (ref 4–15)
NEUTROPHILS # BLD AUTO: 3.2 K/UL (ref 1.8–7.7)
NEUTROPHILS NFR BLD: 60.2 % (ref 38–73)
NRBC BLD-RTO: 0 /100 WBC
PLATELET # BLD AUTO: 282 K/UL (ref 150–350)
PMV BLD AUTO: 10.2 FL (ref 9.2–12.9)
POTASSIUM SERPL-SCNC: 3.9 MMOL/L (ref 3.5–5.1)
PROT SERPL-MCNC: 7.7 G/DL (ref 6–8.4)
PROTHROMBIN TIME: 10.3 SEC (ref 9–12.5)
RBC # BLD AUTO: 4.52 M/UL (ref 4–5.4)
SATURATED IRON: 13 % (ref 20–50)
SODIUM SERPL-SCNC: 139 MMOL/L (ref 136–145)
TOTAL IRON BINDING CAPACITY: 447 UG/DL (ref 250–450)
TRANSFERRIN SERPL-MCNC: 302 MG/DL (ref 200–375)
WBC # BLD AUTO: 5.26 K/UL (ref 3.9–12.7)

## 2021-03-08 PROCEDURE — 82103 ALPHA-1-ANTITRYPSIN TOTAL: CPT | Performed by: INTERNAL MEDICINE

## 2021-03-08 PROCEDURE — 83516 IMMUNOASSAY NONANTIBODY: CPT | Mod: 59 | Performed by: INTERNAL MEDICINE

## 2021-03-08 PROCEDURE — 80307 DRUG TEST PRSMV CHEM ANLYZR: CPT | Performed by: INTERNAL MEDICINE

## 2021-03-08 PROCEDURE — 82239 BILE ACIDS TOTAL: CPT | Performed by: INTERNAL MEDICINE

## 2021-03-08 PROCEDURE — 86709 HEPATITIS A IGM ANTIBODY: CPT | Performed by: INTERNAL MEDICINE

## 2021-03-08 PROCEDURE — 85610 PROTHROMBIN TIME: CPT | Performed by: INTERNAL MEDICINE

## 2021-03-08 PROCEDURE — 83540 ASSAY OF IRON: CPT | Performed by: INTERNAL MEDICINE

## 2021-03-08 PROCEDURE — 86803 HEPATITIS C AB TEST: CPT | Performed by: INTERNAL MEDICINE

## 2021-03-08 PROCEDURE — 87522 HEPATITIS C REVRS TRNSCRPJ: CPT | Performed by: INTERNAL MEDICINE

## 2021-03-08 PROCEDURE — 80053 COMPREHEN METABOLIC PANEL: CPT | Performed by: INTERNAL MEDICINE

## 2021-03-08 PROCEDURE — 82784 ASSAY IGA/IGD/IGG/IGM EACH: CPT | Mod: 59 | Performed by: INTERNAL MEDICINE

## 2021-03-08 PROCEDURE — 82103 ALPHA-1-ANTITRYPSIN TOTAL: CPT | Mod: 91 | Performed by: INTERNAL MEDICINE

## 2021-03-08 PROCEDURE — 86235 NUCLEAR ANTIGEN ANTIBODY: CPT | Performed by: INTERNAL MEDICINE

## 2021-03-08 PROCEDURE — 82728 ASSAY OF FERRITIN: CPT | Performed by: INTERNAL MEDICINE

## 2021-03-08 PROCEDURE — 86790 VIRUS ANTIBODY NOS: CPT | Performed by: INTERNAL MEDICINE

## 2021-03-08 PROCEDURE — 86038 ANTINUCLEAR ANTIBODIES: CPT | Performed by: INTERNAL MEDICINE

## 2021-03-08 PROCEDURE — 82784 ASSAY IGA/IGD/IGG/IGM EACH: CPT | Performed by: INTERNAL MEDICINE

## 2021-03-08 PROCEDURE — 80321 ALCOHOLS BIOMARKERS 1OR 2: CPT | Performed by: INTERNAL MEDICINE

## 2021-03-08 PROCEDURE — 85025 COMPLETE CBC W/AUTO DIFF WBC: CPT | Performed by: INTERNAL MEDICINE

## 2021-03-08 PROCEDURE — 86256 FLUORESCENT ANTIBODY TITER: CPT | Mod: 91 | Performed by: INTERNAL MEDICINE

## 2021-03-08 PROCEDURE — 87340 HEPATITIS B SURFACE AG IA: CPT | Performed by: INTERNAL MEDICINE

## 2021-03-08 PROCEDURE — 82105 ALPHA-FETOPROTEIN SERUM: CPT | Performed by: INTERNAL MEDICINE

## 2021-03-08 PROCEDURE — 86706 HEP B SURFACE ANTIBODY: CPT | Performed by: INTERNAL MEDICINE

## 2021-03-08 PROCEDURE — 86704 HEP B CORE ANTIBODY TOTAL: CPT | Performed by: INTERNAL MEDICINE

## 2021-03-08 PROCEDURE — 82390 ASSAY OF CERULOPLASMIN: CPT | Performed by: INTERNAL MEDICINE

## 2021-03-09 ENCOUNTER — HOSPITAL ENCOUNTER (OUTPATIENT)
Dept: RADIOLOGY | Facility: HOSPITAL | Age: 36
Discharge: HOME | End: 2021-03-09
Attending: INTERNAL MEDICINE
Payer: OTHER GOVERNMENT

## 2021-03-09 DIAGNOSIS — R74.8 ELEVATED LIVER ENZYMES: ICD-10-CM

## 2021-03-09 LAB
A1AT SERPL-MCNC: 104 MG/DL (ref 100–190)
ANA SER QL IF: NORMAL
BILE AC SERPL-SCNC: 2 MCMOL/L
CERULOPLASMIN SERPL-MCNC: 33 MG/DL (ref 15–45)
HAV IGM SERPL QL IA: NEGATIVE
HBV CORE AB SERPL QL IA: NEGATIVE
HBV SURFACE AB SER-ACNC: NEGATIVE M[IU]/ML
HBV SURFACE AG SERPL QL IA: NEGATIVE
HCV AB SERPL QL IA: NEGATIVE
HEPATITIS A ANTIBODY, IGG: NEGATIVE

## 2021-03-09 PROCEDURE — 25500020 PHARM REV CODE 255: Performed by: INTERNAL MEDICINE

## 2021-03-09 PROCEDURE — 74183 MRI ABDOMEN WITH AND WO_INC MRCP: ICD-10-PCS | Mod: 26,,, | Performed by: RADIOLOGY

## 2021-03-09 PROCEDURE — 76376 MRI ABDOMEN WITH AND WO_INC MRCP: ICD-10-PCS | Mod: 26,,, | Performed by: RADIOLOGY

## 2021-03-09 PROCEDURE — A9585 GADOBUTROL INJECTION: HCPCS | Performed by: INTERNAL MEDICINE

## 2021-03-09 PROCEDURE — 76376 3D RENDER W/INTRP POSTPROCES: CPT | Mod: TC

## 2021-03-09 PROCEDURE — 76376 3D RENDER W/INTRP POSTPROCES: CPT | Mod: 26,,, | Performed by: RADIOLOGY

## 2021-03-09 PROCEDURE — 74183 MRI ABD W/O CNTR FLWD CNTR: CPT | Mod: 26,,, | Performed by: RADIOLOGY

## 2021-03-09 RX ORDER — GADOBUTROL 604.72 MG/ML
10 INJECTION INTRAVENOUS
Status: COMPLETED | OUTPATIENT
Start: 2021-03-09 | End: 2021-03-09

## 2021-03-09 RX ADMIN — GADOBUTROL 10 ML: 604.72 INJECTION INTRAVENOUS at 04:03

## 2021-03-10 LAB
AMPHETAMINES SERPL QL: NEGATIVE
BARBITURATES SERPL QL SCN: NEGATIVE
BENZODIAZ SERPL QL SCN: NEGATIVE
BZE SERPL QL: NEGATIVE
CARBOXYTHC SERPL QL SCN: NEGATIVE
ETHANOL SERPL QL SCN: NEGATIVE
HCV RNA SERPL NAA+PROBE-LOG IU: <1.08 LOG (10) IU/ML
HCV RNA SERPL QL NAA+PROBE: NOT DETECTED IU/ML
HCV RNA SPEC NAA+PROBE-ACNC: <12 IU/ML
METHADONE SERPL QL SCN: NEGATIVE
MITOCHONDRIA AB TITR SER IF: NORMAL {TITER}
OPIATES SERPL QL SCN: NEGATIVE
PCP SERPL QL SCN: NEGATIVE
PROPOXYPH SERPL QL: NEGATIVE
SMOOTH MUSCLE AB TITR SER IF: NORMAL {TITER}
TTG IGA SER-ACNC: 3 UNITS

## 2021-03-11 ENCOUNTER — PATIENT MESSAGE (OUTPATIENT)
Dept: FAMILY MEDICINE | Facility: CLINIC | Age: 36
End: 2021-03-11

## 2021-03-12 LAB
A1AT PHENOTYP SERPL-IMP: NORMAL BANDS
A1AT SERPL NEPH-MCNC: 114 MG/DL (ref 100–190)

## 2021-03-15 LAB — PHOSPHATIDYLETHANOL (PETH): NEGATIVE NG/ML

## 2021-04-02 ENCOUNTER — PATIENT MESSAGE (OUTPATIENT)
Dept: FAMILY MEDICINE | Facility: CLINIC | Age: 36
End: 2021-04-02

## 2021-04-02 DIAGNOSIS — E78.00 PURE HYPERCHOLESTEROLEMIA: Primary | ICD-10-CM

## 2021-04-05 ENCOUNTER — IMMUNIZATION (OUTPATIENT)
Dept: OBSTETRICS AND GYNECOLOGY | Facility: CLINIC | Age: 36
End: 2021-04-05
Payer: OTHER GOVERNMENT

## 2021-04-05 ENCOUNTER — LAB VISIT (OUTPATIENT)
Dept: LAB | Facility: HOSPITAL | Age: 36
End: 2021-04-05
Attending: INTERNAL MEDICINE
Payer: OTHER GOVERNMENT

## 2021-04-05 DIAGNOSIS — Z23 NEED FOR VACCINATION: Primary | ICD-10-CM

## 2021-04-05 DIAGNOSIS — R74.8 ELEVATED LIVER ENZYMES: ICD-10-CM

## 2021-04-05 PROCEDURE — 91300 COVID-19, MRNA, LNP-S, PF, 30 MCG/0.3 ML DOSE VACCINE: ICD-10-PCS | Mod: ,,, | Performed by: FAMILY MEDICINE

## 2021-04-05 PROCEDURE — 84702 CHORIONIC GONADOTROPIN TEST: CPT | Performed by: INTERNAL MEDICINE

## 2021-04-05 PROCEDURE — 91300 COVID-19, MRNA, LNP-S, PF, 30 MCG/0.3 ML DOSE VACCINE: CPT | Mod: ,,, | Performed by: FAMILY MEDICINE

## 2021-04-05 PROCEDURE — 0001A COVID-19, MRNA, LNP-S, PF, 30 MCG/0.3 ML DOSE VACCINE: CPT | Mod: CV19,,, | Performed by: FAMILY MEDICINE

## 2021-04-05 PROCEDURE — 0001A COVID-19, MRNA, LNP-S, PF, 30 MCG/0.3 ML DOSE VACCINE: ICD-10-PCS | Mod: CV19,,, | Performed by: FAMILY MEDICINE

## 2021-04-05 PROCEDURE — 36415 COLL VENOUS BLD VENIPUNCTURE: CPT | Performed by: INTERNAL MEDICINE

## 2021-04-06 ENCOUNTER — PATIENT MESSAGE (OUTPATIENT)
Dept: ADMINISTRATIVE | Facility: HOSPITAL | Age: 36
End: 2021-04-06

## 2021-04-06 LAB — B-HCG SERPL-ACNC: <0.6 IU/L

## 2021-04-07 ENCOUNTER — PATIENT MESSAGE (OUTPATIENT)
Dept: FAMILY MEDICINE | Facility: CLINIC | Age: 36
End: 2021-04-07

## 2021-04-07 DIAGNOSIS — L03.818 CELLULITIS OF OTHER SPECIFIED SITE: Primary | ICD-10-CM

## 2021-04-07 DIAGNOSIS — L03.818 CELLULITIS OF OTHER SPECIFIED SITE: ICD-10-CM

## 2021-04-07 RX ORDER — MUPIROCIN 20 MG/G
OINTMENT TOPICAL 3 TIMES DAILY
Qty: 22 G | Refills: 1 | Status: CANCELLED | OUTPATIENT
Start: 2021-04-07 | End: 2021-04-17

## 2021-04-07 RX ORDER — MUPIROCIN 20 MG/G
OINTMENT TOPICAL 3 TIMES DAILY
Qty: 22 G | Refills: 1 | Status: SHIPPED | OUTPATIENT
Start: 2021-04-07 | End: 2021-04-18

## 2021-04-07 RX ORDER — MUPIROCIN 20 MG/G
OINTMENT TOPICAL 3 TIMES DAILY
Qty: 22 G | Refills: 1 | Status: SHIPPED | OUTPATIENT
Start: 2021-04-07 | End: 2021-04-07 | Stop reason: SDUPTHER

## 2021-04-08 ENCOUNTER — PATIENT MESSAGE (OUTPATIENT)
Dept: HEPATOLOGY | Facility: CLINIC | Age: 36
End: 2021-04-08

## 2021-04-08 ENCOUNTER — OFFICE VISIT (OUTPATIENT)
Dept: HEPATOLOGY | Facility: CLINIC | Age: 36
End: 2021-04-08
Payer: OTHER GOVERNMENT

## 2021-04-08 ENCOUNTER — PROCEDURE VISIT (OUTPATIENT)
Dept: HEPATOLOGY | Facility: CLINIC | Age: 36
End: 2021-04-08
Payer: OTHER GOVERNMENT

## 2021-04-08 ENCOUNTER — TELEPHONE (OUTPATIENT)
Dept: HEPATOLOGY | Facility: CLINIC | Age: 36
End: 2021-04-08

## 2021-04-08 DIAGNOSIS — R74.8 ELEVATED LIVER ENZYMES: ICD-10-CM

## 2021-04-08 DIAGNOSIS — Z87.19 HISTORY OF CHOLESTASIS DURING PREGNANCY: ICD-10-CM

## 2021-04-08 DIAGNOSIS — R74.8 ELEVATED LIVER ENZYMES: Primary | ICD-10-CM

## 2021-04-08 DIAGNOSIS — Z87.59 HISTORY OF CHOLESTASIS DURING PREGNANCY: ICD-10-CM

## 2021-04-08 PROCEDURE — 91200 LIVER ELASTOGRAPHY: CPT | Mod: PBBFAC | Performed by: INTERNAL MEDICINE

## 2021-04-08 PROCEDURE — 99213 PR OFFICE/OUTPT VISIT, EST, LEVL III, 20-29 MIN: ICD-10-PCS | Mod: 95,,, | Performed by: INTERNAL MEDICINE

## 2021-04-08 PROCEDURE — 99213 OFFICE O/P EST LOW 20 MIN: CPT | Mod: 95,,, | Performed by: INTERNAL MEDICINE

## 2021-04-08 PROCEDURE — 91200 FIBROSCAN (VIBRATION CONTROLLED TRANSIENT ELASTOGRAPHY): ICD-10-PCS | Mod: 26,S$PBB,, | Performed by: INTERNAL MEDICINE

## 2021-04-09 ENCOUNTER — PATIENT MESSAGE (OUTPATIENT)
Dept: HEPATOLOGY | Facility: CLINIC | Age: 36
End: 2021-04-09

## 2021-04-09 ENCOUNTER — TELEPHONE (OUTPATIENT)
Dept: HEPATOLOGY | Facility: CLINIC | Age: 36
End: 2021-04-09

## 2021-04-09 DIAGNOSIS — K76.0 FATTY LIVER: Primary | ICD-10-CM

## 2021-04-19 ENCOUNTER — HOSPITAL ENCOUNTER (OUTPATIENT)
Dept: RADIOLOGY | Facility: HOSPITAL | Age: 36
Discharge: HOME | End: 2021-04-19
Attending: INTERNAL MEDICINE
Payer: OTHER GOVERNMENT

## 2021-04-19 DIAGNOSIS — K76.0 FATTY LIVER: ICD-10-CM

## 2021-04-19 PROCEDURE — 76391 MR ELASTOGRAPHY: CPT | Mod: 26,,, | Performed by: RADIOLOGY

## 2021-04-19 PROCEDURE — 76391 MR ELASTOGRAPHY: ICD-10-PCS | Mod: 26,,, | Performed by: RADIOLOGY

## 2021-04-19 PROCEDURE — 76391 MR ELASTOGRAPHY: CPT | Mod: TC

## 2021-04-21 ENCOUNTER — PATIENT MESSAGE (OUTPATIENT)
Dept: HEPATOLOGY | Facility: CLINIC | Age: 36
End: 2021-04-21

## 2021-04-21 DIAGNOSIS — R74.8 ELEVATED LIVER ENZYMES: Primary | ICD-10-CM

## 2021-04-27 ENCOUNTER — IMMUNIZATION (OUTPATIENT)
Dept: OBSTETRICS AND GYNECOLOGY | Facility: CLINIC | Age: 36
End: 2021-04-27

## 2021-04-27 DIAGNOSIS — Z23 NEED FOR VACCINATION: Primary | ICD-10-CM

## 2021-04-27 PROCEDURE — 91300 COVID-19, MRNA, LNP-S, PF, 30 MCG/0.3 ML DOSE VACCINE: ICD-10-PCS | Mod: ,,, | Performed by: FAMILY MEDICINE

## 2021-04-27 PROCEDURE — 0002A COVID-19, MRNA, LNP-S, PF, 30 MCG/0.3 ML DOSE VACCINE: ICD-10-PCS | Mod: CV19,,, | Performed by: FAMILY MEDICINE

## 2021-04-27 PROCEDURE — 91300 COVID-19, MRNA, LNP-S, PF, 30 MCG/0.3 ML DOSE VACCINE: CPT | Mod: ,,, | Performed by: FAMILY MEDICINE

## 2021-04-27 PROCEDURE — 0002A COVID-19, MRNA, LNP-S, PF, 30 MCG/0.3 ML DOSE VACCINE: CPT | Mod: CV19,,, | Performed by: FAMILY MEDICINE

## 2021-04-28 ENCOUNTER — PATIENT MESSAGE (OUTPATIENT)
Dept: OBSTETRICS AND GYNECOLOGY | Facility: CLINIC | Age: 36
End: 2021-04-28

## 2021-05-03 ENCOUNTER — OFFICE VISIT (OUTPATIENT)
Dept: OBSTETRICS AND GYNECOLOGY | Facility: CLINIC | Age: 36
End: 2021-05-03
Payer: OTHER GOVERNMENT

## 2021-05-03 VITALS
WEIGHT: 181.13 LBS | DIASTOLIC BLOOD PRESSURE: 82 MMHG | SYSTOLIC BLOOD PRESSURE: 126 MMHG | BODY MASS INDEX: 34.22 KG/M2

## 2021-05-03 DIAGNOSIS — Z01.419 VISIT FOR GYNECOLOGIC EXAMINATION: Primary | ICD-10-CM

## 2021-05-03 DIAGNOSIS — B37.31 CANDIDAL VAGINITIS: ICD-10-CM

## 2021-05-03 PROCEDURE — 99212 OFFICE O/P EST SF 10 MIN: CPT | Mod: PBBFAC | Performed by: OBSTETRICS & GYNECOLOGY

## 2021-05-03 PROCEDURE — 99999 PR PBB SHADOW E&M-EST. PATIENT-LVL II: ICD-10-PCS | Mod: PBBFAC,,, | Performed by: OBSTETRICS & GYNECOLOGY

## 2021-05-03 PROCEDURE — 88175 CYTOPATH C/V AUTO FLUID REDO: CPT | Performed by: OBSTETRICS & GYNECOLOGY

## 2021-05-03 PROCEDURE — 99999 PR PBB SHADOW E&M-EST. PATIENT-LVL II: CPT | Mod: PBBFAC,,, | Performed by: OBSTETRICS & GYNECOLOGY

## 2021-05-03 PROCEDURE — 99395 PREV VISIT EST AGE 18-39: CPT | Mod: S$PBB,,, | Performed by: OBSTETRICS & GYNECOLOGY

## 2021-05-03 PROCEDURE — 87481 CANDIDA DNA AMP PROBE: CPT | Mod: 59 | Performed by: OBSTETRICS & GYNECOLOGY

## 2021-05-03 PROCEDURE — 99395 PR PREVENTIVE VISIT,EST,18-39: ICD-10-PCS | Mod: S$PBB,,, | Performed by: OBSTETRICS & GYNECOLOGY

## 2021-05-03 PROCEDURE — 87624 HPV HI-RISK TYP POOLED RSLT: CPT | Performed by: OBSTETRICS & GYNECOLOGY

## 2021-05-03 RX ORDER — FLUCONAZOLE 150 MG/1
150 TABLET ORAL ONCE
Qty: 1 TABLET | Refills: 1 | Status: SHIPPED | OUTPATIENT
Start: 2021-05-03 | End: 2021-05-03

## 2021-05-05 LAB
BACTERIAL VAGINOSIS DNA: NEGATIVE
CANDIDA GLABRATA DNA: NEGATIVE
CANDIDA KRUSEI DNA: NEGATIVE
CANDIDA RRNA VAG QL PROBE: NEGATIVE
T VAGINALIS RRNA GENITAL QL PROBE: NEGATIVE

## 2021-05-06 ENCOUNTER — TELEPHONE (OUTPATIENT)
Dept: HEPATOLOGY | Facility: CLINIC | Age: 36
End: 2021-05-06

## 2021-05-06 ENCOUNTER — LAB VISIT (OUTPATIENT)
Dept: LAB | Facility: HOSPITAL | Age: 36
End: 2021-05-06
Attending: INTERNAL MEDICINE
Payer: OTHER GOVERNMENT

## 2021-05-06 DIAGNOSIS — R74.8 ELEVATED LIVER ENZYMES: ICD-10-CM

## 2021-05-06 DIAGNOSIS — Z87.59 HISTORY OF CHOLESTASIS DURING PREGNANCY: ICD-10-CM

## 2021-05-06 DIAGNOSIS — Z87.19 HISTORY OF CHOLESTASIS DURING PREGNANCY: ICD-10-CM

## 2021-05-06 LAB
GGT SERPL-CCNC: 453 U/L (ref 8–55)
HPV HR 12 DNA SPEC QL NAA+PROBE: NEGATIVE
HPV16 AG SPEC QL: NEGATIVE
HPV18 DNA SPEC QL NAA+PROBE: NEGATIVE

## 2021-05-06 PROCEDURE — 82977 ASSAY OF GGT: CPT | Performed by: INTERNAL MEDICINE

## 2021-05-06 PROCEDURE — 36415 COLL VENOUS BLD VENIPUNCTURE: CPT | Performed by: INTERNAL MEDICINE

## 2021-05-10 LAB
FINAL PATHOLOGIC DIAGNOSIS: NORMAL
Lab: NORMAL

## 2021-05-16 ENCOUNTER — PATIENT MESSAGE (OUTPATIENT)
Dept: OBSTETRICS AND GYNECOLOGY | Facility: CLINIC | Age: 36
End: 2021-05-16

## 2021-05-21 ENCOUNTER — PATIENT MESSAGE (OUTPATIENT)
Dept: OTOLARYNGOLOGY | Facility: CLINIC | Age: 36
End: 2021-05-21

## 2021-06-03 ENCOUNTER — PATIENT MESSAGE (OUTPATIENT)
Dept: FAMILY MEDICINE | Facility: CLINIC | Age: 36
End: 2021-06-03

## 2021-06-03 ENCOUNTER — LAB VISIT (OUTPATIENT)
Dept: LAB | Facility: HOSPITAL | Age: 36
End: 2021-06-03
Attending: INTERNAL MEDICINE
Payer: OTHER GOVERNMENT

## 2021-06-03 ENCOUNTER — TELEPHONE (OUTPATIENT)
Dept: GENETICS | Facility: CLINIC | Age: 36
End: 2021-06-03

## 2021-06-03 ENCOUNTER — PATIENT MESSAGE (OUTPATIENT)
Dept: GENETICS | Facility: CLINIC | Age: 36
End: 2021-06-03

## 2021-06-03 DIAGNOSIS — Z87.59 HISTORY OF CHOLESTASIS DURING PREGNANCY: ICD-10-CM

## 2021-06-03 DIAGNOSIS — Z87.19 HISTORY OF CHOLESTASIS DURING PREGNANCY: ICD-10-CM

## 2021-06-03 DIAGNOSIS — R74.8 ELEVATED LIVER ENZYMES: ICD-10-CM

## 2021-06-03 LAB — GGT SERPL-CCNC: 380 U/L (ref 8–55)

## 2021-06-03 PROCEDURE — 82977 ASSAY OF GGT: CPT | Performed by: INTERNAL MEDICINE

## 2021-06-04 ENCOUNTER — PATIENT MESSAGE (OUTPATIENT)
Dept: HEPATOLOGY | Facility: CLINIC | Age: 36
End: 2021-06-04

## 2021-06-04 ENCOUNTER — OFFICE VISIT (OUTPATIENT)
Dept: GENETICS | Facility: CLINIC | Age: 36
End: 2021-06-04
Payer: OTHER GOVERNMENT

## 2021-06-04 ENCOUNTER — TELEPHONE (OUTPATIENT)
Dept: FAMILY MEDICINE | Facility: CLINIC | Age: 36
End: 2021-06-04

## 2021-06-04 VITALS — WEIGHT: 180 LBS | HEIGHT: 62 IN | BODY MASS INDEX: 33.13 KG/M2

## 2021-06-04 DIAGNOSIS — O26.649 CHOLESTASIS DURING PREGNANCY, ANTEPARTUM: ICD-10-CM

## 2021-06-04 DIAGNOSIS — Z87.59 HISTORY OF CHOLESTASIS DURING PREGNANCY: ICD-10-CM

## 2021-06-04 DIAGNOSIS — Z87.19 HISTORY OF CHOLESTASIS DURING PREGNANCY: ICD-10-CM

## 2021-06-04 DIAGNOSIS — R74.8 ELEVATED LIVER ENZYMES: Primary | ICD-10-CM

## 2021-06-04 DIAGNOSIS — R79.89 ELEVATED LIVER FUNCTION TESTS: Primary | ICD-10-CM

## 2021-06-04 PROCEDURE — 99999 PR PBB SHADOW E&M-EST. PATIENT-LVL III: ICD-10-PCS | Mod: PBBFAC,,, | Performed by: MEDICAL GENETICS

## 2021-06-04 PROCEDURE — 99245 PR OFFICE CONSULTATION,LEVEL V: ICD-10-PCS | Mod: S$PBB,,, | Performed by: MEDICAL GENETICS

## 2021-06-04 PROCEDURE — 99213 OFFICE O/P EST LOW 20 MIN: CPT | Mod: PBBFAC | Performed by: MEDICAL GENETICS

## 2021-06-04 PROCEDURE — 99999 PR PBB SHADOW E&M-EST. PATIENT-LVL III: CPT | Mod: PBBFAC,,, | Performed by: MEDICAL GENETICS

## 2021-06-04 PROCEDURE — 99245 OFF/OP CONSLTJ NEW/EST HI 55: CPT | Mod: S$PBB,,, | Performed by: MEDICAL GENETICS

## 2021-06-07 ENCOUNTER — TELEPHONE (OUTPATIENT)
Dept: INTERVENTIONAL RADIOLOGY/VASCULAR | Facility: OTHER | Age: 36
End: 2021-06-07

## 2021-06-07 ENCOUNTER — TELEPHONE (OUTPATIENT)
Dept: HEPATOLOGY | Facility: CLINIC | Age: 36
End: 2021-06-07

## 2021-06-07 ENCOUNTER — PATIENT MESSAGE (OUTPATIENT)
Dept: HEPATOLOGY | Facility: CLINIC | Age: 36
End: 2021-06-07

## 2021-06-07 ENCOUNTER — OFFICE VISIT (OUTPATIENT)
Dept: ENDOCRINOLOGY | Facility: CLINIC | Age: 36
End: 2021-06-07
Payer: OTHER GOVERNMENT

## 2021-06-07 VITALS
WEIGHT: 180.81 LBS | HEART RATE: 78 BPM | SYSTOLIC BLOOD PRESSURE: 110 MMHG | TEMPERATURE: 98 F | BODY MASS INDEX: 33.27 KG/M2 | DIASTOLIC BLOOD PRESSURE: 88 MMHG

## 2021-06-07 DIAGNOSIS — R74.8 ELEVATED LIVER ENZYMES: ICD-10-CM

## 2021-06-07 DIAGNOSIS — E06.3 HYPOTHYROIDISM DUE TO HASHIMOTO'S THYROIDITIS: Primary | ICD-10-CM

## 2021-06-07 DIAGNOSIS — E03.8 HYPOTHYROIDISM DUE TO HASHIMOTO'S THYROIDITIS: Primary | ICD-10-CM

## 2021-06-07 PROCEDURE — 99214 OFFICE O/P EST MOD 30 MIN: CPT | Mod: S$PBB,,, | Performed by: HOSPITALIST

## 2021-06-07 PROCEDURE — 99214 PR OFFICE/OUTPT VISIT, EST, LEVL IV, 30-39 MIN: ICD-10-PCS | Mod: S$PBB,,, | Performed by: HOSPITALIST

## 2021-06-07 PROCEDURE — 99999 PR PBB SHADOW E&M-EST. PATIENT-LVL III: ICD-10-PCS | Mod: PBBFAC,,, | Performed by: HOSPITALIST

## 2021-06-07 PROCEDURE — 99213 OFFICE O/P EST LOW 20 MIN: CPT | Mod: PBBFAC,PN | Performed by: HOSPITALIST

## 2021-06-07 PROCEDURE — 99999 PR PBB SHADOW E&M-EST. PATIENT-LVL III: CPT | Mod: PBBFAC,,, | Performed by: HOSPITALIST

## 2021-06-07 RX ORDER — LEVOTHYROXINE SODIUM 50 UG/1
50 TABLET ORAL DAILY
COMMUNITY
Start: 2021-04-08 | End: 2021-06-07 | Stop reason: SDUPTHER

## 2021-06-08 ENCOUNTER — PATIENT MESSAGE (OUTPATIENT)
Dept: FAMILY MEDICINE | Facility: CLINIC | Age: 36
End: 2021-06-08

## 2021-06-18 ENCOUNTER — HOSPITAL ENCOUNTER (OUTPATIENT)
Facility: OTHER | Age: 36
Discharge: HOME | End: 2021-06-18
Attending: RADIOLOGY | Admitting: RADIOLOGY
Payer: OTHER GOVERNMENT

## 2021-06-18 VITALS
OXYGEN SATURATION: 99 % | RESPIRATION RATE: 17 BRPM | SYSTOLIC BLOOD PRESSURE: 114 MMHG | BODY MASS INDEX: 33.99 KG/M2 | DIASTOLIC BLOOD PRESSURE: 69 MMHG | TEMPERATURE: 98 F | HEIGHT: 61 IN | HEART RATE: 83 BPM | WEIGHT: 180 LBS

## 2021-06-18 DIAGNOSIS — R79.89 ELEVATED LIVER FUNCTION TESTS: ICD-10-CM

## 2021-06-18 DIAGNOSIS — K76.89 LIVER DYSFUNCTION: ICD-10-CM

## 2021-06-18 LAB
B-HCG UR QL: NEGATIVE
CTP QC/QA: YES

## 2021-06-18 PROCEDURE — 88307 PR  SURG PATH,LEVEL V: ICD-10-PCS | Mod: 26,,, | Performed by: PATHOLOGY

## 2021-06-18 PROCEDURE — 88313 PR  SPECIAL STAINS,GROUP II: ICD-10-PCS | Mod: 26,,, | Performed by: PATHOLOGY

## 2021-06-18 PROCEDURE — 88342 CHG IMMUNOCYTOCHEMISTRY: ICD-10-PCS | Mod: 26,,, | Performed by: PATHOLOGY

## 2021-06-18 PROCEDURE — 88307 TISSUE EXAM BY PATHOLOGIST: CPT | Mod: 26,,, | Performed by: PATHOLOGY

## 2021-06-18 PROCEDURE — 88342 IMHCHEM/IMCYTCHM 1ST ANTB: CPT | Performed by: PATHOLOGY

## 2021-06-18 PROCEDURE — 63600175 PHARM REV CODE 636 W HCPCS: Performed by: RADIOLOGY

## 2021-06-18 PROCEDURE — 81025 URINE PREGNANCY TEST: CPT | Performed by: RADIOLOGY

## 2021-06-18 PROCEDURE — 99152 MOD SED SAME PHYS/QHP 5/>YRS: CPT | Performed by: RADIOLOGY

## 2021-06-18 PROCEDURE — 88342 IMHCHEM/IMCYTCHM 1ST ANTB: CPT | Mod: 26,,, | Performed by: PATHOLOGY

## 2021-06-18 PROCEDURE — 88313 SPECIAL STAINS GROUP 2: CPT | Mod: 26,,, | Performed by: PATHOLOGY

## 2021-06-18 PROCEDURE — 88307 TISSUE EXAM BY PATHOLOGIST: CPT | Performed by: PATHOLOGY

## 2021-06-18 PROCEDURE — 88313 SPECIAL STAINS GROUP 2: CPT | Performed by: PATHOLOGY

## 2021-06-18 RX ORDER — FENTANYL CITRATE 50 UG/ML
INJECTION, SOLUTION INTRAMUSCULAR; INTRAVENOUS
Status: DISCONTINUED | OUTPATIENT
Start: 2021-06-18 | End: 2021-06-18 | Stop reason: HOSPADM

## 2021-06-18 RX ORDER — HYDROCODONE BITARTRATE AND ACETAMINOPHEN 5; 325 MG/1; MG/1
1 TABLET ORAL EVERY 4 HOURS PRN
Status: DISCONTINUED | OUTPATIENT
Start: 2021-06-18 | End: 2021-06-18 | Stop reason: HOSPADM

## 2021-06-18 RX ORDER — MIDAZOLAM HYDROCHLORIDE 1 MG/ML
INJECTION, SOLUTION INTRAMUSCULAR; INTRAVENOUS
Status: DISCONTINUED | OUTPATIENT
Start: 2021-06-18 | End: 2021-06-18 | Stop reason: HOSPADM

## 2021-06-21 ENCOUNTER — TELEPHONE (OUTPATIENT)
Dept: TRANSPLANT | Facility: CLINIC | Age: 36
End: 2021-06-21

## 2021-06-21 ENCOUNTER — NURSE TRIAGE (OUTPATIENT)
Dept: ADMINISTRATIVE | Facility: CLINIC | Age: 36
End: 2021-06-21

## 2021-06-24 ENCOUNTER — CONFERENCE (OUTPATIENT)
Dept: TRANSPLANT | Facility: CLINIC | Age: 36
End: 2021-06-24

## 2021-06-24 ENCOUNTER — HOSPITAL ENCOUNTER (EMERGENCY)
Facility: OTHER | Age: 36
Discharge: HOME | End: 2021-06-24
Attending: EMERGENCY MEDICINE
Payer: OTHER GOVERNMENT

## 2021-06-24 VITALS
SYSTOLIC BLOOD PRESSURE: 134 MMHG | BODY MASS INDEX: 33.99 KG/M2 | HEART RATE: 82 BPM | WEIGHT: 180 LBS | DIASTOLIC BLOOD PRESSURE: 75 MMHG | OXYGEN SATURATION: 100 % | RESPIRATION RATE: 16 BRPM | HEIGHT: 61 IN | TEMPERATURE: 98 F

## 2021-06-24 DIAGNOSIS — R74.8 ELEVATED LIVER ENZYMES: ICD-10-CM

## 2021-06-24 DIAGNOSIS — R42 LIGHTHEADED: ICD-10-CM

## 2021-06-24 DIAGNOSIS — R53.1 GENERALIZED WEAKNESS: Primary | ICD-10-CM

## 2021-06-24 LAB
ALBUMIN SERPL BCP-MCNC: 3.9 G/DL (ref 3.5–5.2)
ALP SERPL-CCNC: 220 U/L (ref 55–135)
ALT SERPL W/O P-5'-P-CCNC: 115 U/L (ref 10–44)
ANION GAP SERPL CALC-SCNC: 9 MMOL/L (ref 8–16)
AST SERPL-CCNC: 60 U/L (ref 10–40)
B-HCG UR QL: NEGATIVE
BASOPHILS # BLD AUTO: 0.04 K/UL (ref 0–0.2)
BASOPHILS NFR BLD: 0.4 % (ref 0–1.9)
BILIRUB SERPL-MCNC: 0.5 MG/DL (ref 0.1–1)
BILIRUB UR QL STRIP: NEGATIVE
BUN SERPL-MCNC: 12 MG/DL (ref 6–20)
CALCIUM SERPL-MCNC: 10.1 MG/DL (ref 8.7–10.5)
CHLORIDE SERPL-SCNC: 102 MMOL/L (ref 95–110)
CLARITY UR: CLEAR
CO2 SERPL-SCNC: 27 MMOL/L (ref 23–29)
COLOR UR: YELLOW
CREAT SERPL-MCNC: 0.8 MG/DL (ref 0.5–1.4)
CTP QC/QA: YES
DIFFERENTIAL METHOD: NORMAL
EOSINOPHIL # BLD AUTO: 0.1 K/UL (ref 0–0.5)
EOSINOPHIL NFR BLD: 0.7 % (ref 0–8)
ERYTHROCYTE [DISTWIDTH] IN BLOOD BY AUTOMATED COUNT: 12.5 % (ref 11.5–14.5)
EST. GFR  (AFRICAN AMERICAN): >60 ML/MIN/1.73 M^2
EST. GFR  (NON AFRICAN AMERICAN): >60 ML/MIN/1.73 M^2
GLUCOSE SERPL-MCNC: 99 MG/DL (ref 70–110)
GLUCOSE UR QL STRIP: NEGATIVE
HCT VFR BLD AUTO: 43 % (ref 37–48.5)
HGB BLD-MCNC: 14.5 G/DL (ref 12–16)
HGB UR QL STRIP: NEGATIVE
IMM GRANULOCYTES # BLD AUTO: 0.03 K/UL (ref 0–0.04)
IMM GRANULOCYTES NFR BLD AUTO: 0.3 % (ref 0–0.5)
KETONES UR QL STRIP: NEGATIVE
LEUKOCYTE ESTERASE UR QL STRIP: NEGATIVE
LIPASE SERPL-CCNC: 26 U/L (ref 4–60)
LYMPHOCYTES # BLD AUTO: 2.2 K/UL (ref 1–4.8)
LYMPHOCYTES NFR BLD: 24.1 % (ref 18–48)
MCH RBC QN AUTO: 29.8 PG (ref 27–31)
MCHC RBC AUTO-ENTMCNC: 33.7 G/DL (ref 32–36)
MCV RBC AUTO: 88 FL (ref 82–98)
MONOCYTES # BLD AUTO: 0.7 K/UL (ref 0.3–1)
MONOCYTES NFR BLD: 7.8 % (ref 4–15)
NEUTROPHILS # BLD AUTO: 6.2 K/UL (ref 1.8–7.7)
NEUTROPHILS NFR BLD: 66.7 % (ref 38–73)
NITRITE UR QL STRIP: NEGATIVE
NRBC BLD-RTO: 0 /100 WBC
PH UR STRIP: 6 [PH] (ref 5–8)
PLATELET # BLD AUTO: 292 K/UL (ref 150–450)
PMV BLD AUTO: 9.3 FL (ref 9.2–12.9)
POTASSIUM SERPL-SCNC: 3.7 MMOL/L (ref 3.5–5.1)
PROT SERPL-MCNC: 8.1 G/DL (ref 6–8.4)
PROT UR QL STRIP: NEGATIVE
RBC # BLD AUTO: 4.87 M/UL (ref 4–5.4)
SODIUM SERPL-SCNC: 138 MMOL/L (ref 136–145)
SP GR UR STRIP: 1.02 (ref 1–1.03)
URN SPEC COLLECT METH UR: NORMAL
UROBILINOGEN UR STRIP-ACNC: NEGATIVE EU/DL
WBC # BLD AUTO: 9.23 K/UL (ref 3.9–12.7)

## 2021-06-24 PROCEDURE — 93005 ELECTROCARDIOGRAM TRACING: CPT

## 2021-06-24 PROCEDURE — 80053 COMPREHEN METABOLIC PANEL: CPT | Performed by: PHYSICIAN ASSISTANT

## 2021-06-24 PROCEDURE — 93010 EKG 12-LEAD: ICD-10-PCS | Mod: ,,, | Performed by: INTERNAL MEDICINE

## 2021-06-24 PROCEDURE — 99284 EMERGENCY DEPT VISIT MOD MDM: CPT | Mod: 25

## 2021-06-24 PROCEDURE — 85025 COMPLETE CBC W/AUTO DIFF WBC: CPT | Performed by: PHYSICIAN ASSISTANT

## 2021-06-24 PROCEDURE — 81003 URINALYSIS AUTO W/O SCOPE: CPT | Performed by: PHYSICIAN ASSISTANT

## 2021-06-24 PROCEDURE — 93010 ELECTROCARDIOGRAM REPORT: CPT | Mod: ,,, | Performed by: INTERNAL MEDICINE

## 2021-06-24 PROCEDURE — 81025 URINE PREGNANCY TEST: CPT | Performed by: EMERGENCY MEDICINE

## 2021-06-24 PROCEDURE — 83690 ASSAY OF LIPASE: CPT | Performed by: PHYSICIAN ASSISTANT

## 2021-06-27 ENCOUNTER — PATIENT MESSAGE (OUTPATIENT)
Dept: HEPATOLOGY | Facility: CLINIC | Age: 36
End: 2021-06-27

## 2021-06-27 ENCOUNTER — PATIENT MESSAGE (OUTPATIENT)
Dept: ENDOCRINOLOGY | Facility: CLINIC | Age: 36
End: 2021-06-27

## 2021-06-28 ENCOUNTER — TELEPHONE (OUTPATIENT)
Dept: TRANSPLANT | Facility: CLINIC | Age: 36
End: 2021-06-28

## 2021-07-01 RX ORDER — LEVOTHYROXINE SODIUM 50 UG/1
1 CAPSULE ORAL DAILY
Qty: 30 CAPSULE | Refills: 6 | Status: SHIPPED | OUTPATIENT
Start: 2021-07-01 | End: 2021-07-06

## 2021-07-06 ENCOUNTER — TELEPHONE (OUTPATIENT)
Dept: GENETICS | Facility: CLINIC | Age: 36
End: 2021-07-06

## 2021-07-06 ENCOUNTER — PATIENT MESSAGE (OUTPATIENT)
Dept: ENDOCRINOLOGY | Facility: CLINIC | Age: 36
End: 2021-07-06

## 2021-07-06 ENCOUNTER — TELEPHONE (OUTPATIENT)
Dept: ENDOCRINOLOGY | Facility: CLINIC | Age: 36
End: 2021-07-06

## 2021-07-06 ENCOUNTER — LAB VISIT (OUTPATIENT)
Dept: LAB | Facility: HOSPITAL | Age: 36
End: 2021-07-06
Attending: INTERNAL MEDICINE
Payer: OTHER GOVERNMENT

## 2021-07-06 DIAGNOSIS — Z87.19 HISTORY OF CHOLESTASIS DURING PREGNANCY: ICD-10-CM

## 2021-07-06 DIAGNOSIS — Z87.59 HISTORY OF CHOLESTASIS DURING PREGNANCY: ICD-10-CM

## 2021-07-06 DIAGNOSIS — E06.3 HYPOTHYROIDISM DUE TO HASHIMOTO'S THYROIDITIS: Primary | ICD-10-CM

## 2021-07-06 DIAGNOSIS — R74.8 ELEVATED LIVER ENZYMES: ICD-10-CM

## 2021-07-06 DIAGNOSIS — E03.8 HYPOTHYROIDISM DUE TO HASHIMOTO'S THYROIDITIS: Primary | ICD-10-CM

## 2021-07-06 LAB — GGT SERPL-CCNC: 313 U/L (ref 8–55)

## 2021-07-06 PROCEDURE — 36415 COLL VENOUS BLD VENIPUNCTURE: CPT | Performed by: INTERNAL MEDICINE

## 2021-07-06 PROCEDURE — 82977 ASSAY OF GGT: CPT | Performed by: INTERNAL MEDICINE

## 2021-07-06 RX ORDER — LEVOTHYROXINE SODIUM 75 UG/1
75 TABLET ORAL
Qty: 90 TABLET | Refills: 3 | Status: SHIPPED | OUTPATIENT
Start: 2021-07-06 | End: 2022-09-06

## 2021-07-07 ENCOUNTER — TELEPHONE (OUTPATIENT)
Dept: GENETICS | Facility: CLINIC | Age: 36
End: 2021-07-07

## 2021-07-07 ENCOUNTER — PATIENT MESSAGE (OUTPATIENT)
Dept: NUTRITION | Facility: CLINIC | Age: 36
End: 2021-07-07

## 2021-07-08 ENCOUNTER — OFFICE VISIT (OUTPATIENT)
Dept: GENETICS | Facility: CLINIC | Age: 36
End: 2021-07-08
Payer: OTHER GOVERNMENT

## 2021-07-08 DIAGNOSIS — R85.7: ICD-10-CM

## 2021-07-08 DIAGNOSIS — O26.649 CHOLESTASIS DURING PREGNANCY, ANTEPARTUM: ICD-10-CM

## 2021-07-08 DIAGNOSIS — K76.89 LIVER DYSFUNCTION: ICD-10-CM

## 2021-07-08 DIAGNOSIS — R74.8 ELEVATED LIVER ENZYMES: Primary | ICD-10-CM

## 2021-07-08 LAB
FINAL PATHOLOGIC DIAGNOSIS: ABNORMAL
GROSS: ABNORMAL
Lab: ABNORMAL
SUPPLEMENTAL DIAGNOSIS: ABNORMAL

## 2021-07-08 PROCEDURE — 99215 OFFICE O/P EST HI 40 MIN: CPT | Mod: 95,,, | Performed by: MEDICAL GENETICS

## 2021-07-08 PROCEDURE — 99215 PR OFFICE/OUTPT VISIT, EST, LEVL V, 40-54 MIN: ICD-10-PCS | Mod: 95,,, | Performed by: MEDICAL GENETICS

## 2021-07-09 PROBLEM — R85.7: Status: ACTIVE | Noted: 2021-07-09

## 2021-07-12 DIAGNOSIS — H91.90 HEARING DISORDER, UNSPECIFIED LATERALITY: Primary | ICD-10-CM

## 2021-07-19 ENCOUNTER — OFFICE VISIT (OUTPATIENT)
Dept: HEPATOLOGY | Facility: CLINIC | Age: 36
End: 2021-07-19
Payer: OTHER GOVERNMENT

## 2021-07-19 DIAGNOSIS — R85.7: ICD-10-CM

## 2021-07-19 DIAGNOSIS — R74.8 ELEVATED LIVER ENZYMES: Primary | ICD-10-CM

## 2021-07-19 PROCEDURE — 99213 OFFICE O/P EST LOW 20 MIN: CPT | Mod: 95,,, | Performed by: INTERNAL MEDICINE

## 2021-07-19 PROCEDURE — 99213 PR OFFICE/OUTPT VISIT, EST, LEVL III, 20-29 MIN: ICD-10-PCS | Mod: 95,,, | Performed by: INTERNAL MEDICINE

## 2021-07-21 ENCOUNTER — PATIENT MESSAGE (OUTPATIENT)
Dept: HEPATOLOGY | Facility: CLINIC | Age: 36
End: 2021-07-21

## 2021-07-28 ENCOUNTER — TELEPHONE (OUTPATIENT)
Dept: OTOLARYNGOLOGY | Facility: CLINIC | Age: 36
End: 2021-07-28

## 2021-07-28 ENCOUNTER — PATIENT MESSAGE (OUTPATIENT)
Dept: FAMILY MEDICINE | Facility: CLINIC | Age: 36
End: 2021-07-28

## 2021-09-10 ENCOUNTER — PATIENT MESSAGE (OUTPATIENT)
Dept: GENETICS | Facility: CLINIC | Age: 36
End: 2021-09-10

## 2021-09-10 ENCOUNTER — TELEPHONE (OUTPATIENT)
Dept: GENETICS | Facility: CLINIC | Age: 36
End: 2021-09-10

## 2021-11-01 ENCOUNTER — LAB VISIT (OUTPATIENT)
Dept: LAB | Facility: HOSPITAL | Age: 36
End: 2021-11-01
Attending: HOSPITALIST
Payer: OTHER GOVERNMENT

## 2021-11-01 DIAGNOSIS — R74.8 ELEVATED LIVER ENZYMES: ICD-10-CM

## 2021-11-01 DIAGNOSIS — E03.8 HYPOTHYROIDISM DUE TO HASHIMOTO'S THYROIDITIS: ICD-10-CM

## 2021-11-01 DIAGNOSIS — E06.3 HYPOTHYROIDISM DUE TO HASHIMOTO'S THYROIDITIS: ICD-10-CM

## 2021-11-01 LAB
ALBUMIN SERPL BCP-MCNC: 3.9 G/DL (ref 3.5–5.2)
ALBUMIN SERPL BCP-MCNC: 3.9 G/DL (ref 3.5–5.2)
ALP SERPL-CCNC: 109 U/L (ref 55–135)
ALP SERPL-CCNC: 109 U/L (ref 55–135)
ALT SERPL W/O P-5'-P-CCNC: 32 U/L (ref 10–44)
ALT SERPL W/O P-5'-P-CCNC: 32 U/L (ref 10–44)
ANION GAP SERPL CALC-SCNC: 9 MMOL/L (ref 8–16)
AST SERPL-CCNC: 26 U/L (ref 10–40)
AST SERPL-CCNC: 26 U/L (ref 10–40)
BILIRUB DIRECT SERPL-MCNC: 0.2 MG/DL (ref 0.1–0.3)
BILIRUB SERPL-MCNC: 0.5 MG/DL (ref 0.1–1)
BILIRUB SERPL-MCNC: 0.5 MG/DL (ref 0.1–1)
BUN SERPL-MCNC: 10 MG/DL (ref 6–20)
CALCIUM SERPL-MCNC: 10.3 MG/DL (ref 8.7–10.5)
CHLORIDE SERPL-SCNC: 105 MMOL/L (ref 95–110)
CO2 SERPL-SCNC: 24 MMOL/L (ref 23–29)
CREAT SERPL-MCNC: 0.7 MG/DL (ref 0.5–1.4)
EST. GFR  (AFRICAN AMERICAN): >60 ML/MIN/1.73 M^2
EST. GFR  (NON AFRICAN AMERICAN): >60 ML/MIN/1.73 M^2
GGT SERPL-CCNC: 224 U/L (ref 8–55)
GLUCOSE SERPL-MCNC: 89 MG/DL (ref 70–110)
POTASSIUM SERPL-SCNC: 4 MMOL/L (ref 3.5–5.1)
PROT SERPL-MCNC: 7.6 G/DL (ref 6–8.4)
PROT SERPL-MCNC: 7.6 G/DL (ref 6–8.4)
SODIUM SERPL-SCNC: 138 MMOL/L (ref 136–145)
T4 FREE SERPL-MCNC: 1.08 NG/DL (ref 0.71–1.51)
TSH SERPL DL<=0.005 MIU/L-ACNC: 1.82 UIU/ML (ref 0.4–4)

## 2021-11-01 PROCEDURE — 84443 ASSAY THYROID STIM HORMONE: CPT | Performed by: HOSPITALIST

## 2021-11-01 PROCEDURE — 82248 BILIRUBIN DIRECT: CPT | Performed by: INTERNAL MEDICINE

## 2021-11-01 PROCEDURE — 80076 HEPATIC FUNCTION PANEL: CPT | Performed by: INTERNAL MEDICINE

## 2021-11-01 PROCEDURE — 80053 COMPREHEN METABOLIC PANEL: CPT | Performed by: HOSPITALIST

## 2021-11-01 PROCEDURE — 82239 BILE ACIDS TOTAL: CPT | Performed by: INTERNAL MEDICINE

## 2021-11-01 PROCEDURE — 82977 ASSAY OF GGT: CPT | Performed by: INTERNAL MEDICINE

## 2021-11-01 PROCEDURE — 84439 ASSAY OF FREE THYROXINE: CPT | Performed by: HOSPITALIST

## 2021-11-02 ENCOUNTER — OFFICE VISIT (OUTPATIENT)
Dept: ENDOCRINOLOGY | Facility: CLINIC | Age: 36
End: 2021-11-02
Payer: OTHER GOVERNMENT

## 2021-11-02 VITALS
TEMPERATURE: 99 F | SYSTOLIC BLOOD PRESSURE: 109 MMHG | HEART RATE: 77 BPM | BODY MASS INDEX: 33.37 KG/M2 | DIASTOLIC BLOOD PRESSURE: 76 MMHG | WEIGHT: 176.56 LBS

## 2021-11-02 DIAGNOSIS — E03.8 HYPOTHYROIDISM DUE TO HASHIMOTO'S THYROIDITIS: Primary | ICD-10-CM

## 2021-11-02 DIAGNOSIS — E06.3 HYPOTHYROIDISM DUE TO HASHIMOTO'S THYROIDITIS: Primary | ICD-10-CM

## 2021-11-02 PROCEDURE — 99213 OFFICE O/P EST LOW 20 MIN: CPT | Mod: PBBFAC | Performed by: HOSPITALIST

## 2021-11-02 PROCEDURE — 99999 PR PBB SHADOW E&M-EST. PATIENT-LVL III: ICD-10-PCS | Mod: PBBFAC,,, | Performed by: HOSPITALIST

## 2021-11-02 PROCEDURE — 99999 PR PBB SHADOW E&M-EST. PATIENT-LVL III: CPT | Mod: PBBFAC,,, | Performed by: HOSPITALIST

## 2021-11-02 PROCEDURE — 99213 OFFICE O/P EST LOW 20 MIN: CPT | Mod: S$PBB,,, | Performed by: HOSPITALIST

## 2021-11-02 PROCEDURE — 99213 PR OFFICE/OUTPT VISIT, EST, LEVL III, 20-29 MIN: ICD-10-PCS | Mod: S$PBB,,, | Performed by: HOSPITALIST

## 2021-11-02 RX ORDER — AMOXICILLIN AND CLAVULANATE POTASSIUM 500; 125 MG/1; MG/1
TABLET, FILM COATED ORAL
COMMUNITY
Start: 2021-09-15 | End: 2021-11-11

## 2021-11-02 RX ORDER — TOBRAMYCIN AND DEXAMETHASONE 3; 1 MG/ML; MG/ML
SUSPENSION/ DROPS OPHTHALMIC
COMMUNITY
Start: 2021-10-12 | End: 2022-07-01

## 2021-11-03 LAB — BILE AC SERPL-SCNC: 1 MCMOL/L

## 2021-11-04 ENCOUNTER — PATIENT MESSAGE (OUTPATIENT)
Dept: HEPATOLOGY | Facility: CLINIC | Age: 36
End: 2021-11-04
Payer: OTHER GOVERNMENT

## 2021-11-05 ENCOUNTER — OFFICE VISIT (OUTPATIENT)
Dept: HEPATOLOGY | Facility: CLINIC | Age: 36
End: 2021-11-05
Payer: OTHER GOVERNMENT

## 2021-11-05 DIAGNOSIS — R74.8 ELEVATED LIVER ENZYMES: Primary | ICD-10-CM

## 2021-11-05 DIAGNOSIS — K76.89 LIVER DYSFUNCTION: ICD-10-CM

## 2021-11-05 DIAGNOSIS — O26.649 CHOLESTASIS DURING PREGNANCY, ANTEPARTUM: ICD-10-CM

## 2021-11-05 PROCEDURE — 99213 PR OFFICE/OUTPT VISIT, EST, LEVL III, 20-29 MIN: ICD-10-PCS | Mod: 95,,, | Performed by: INTERNAL MEDICINE

## 2021-11-05 PROCEDURE — 99213 OFFICE O/P EST LOW 20 MIN: CPT | Mod: 95,,, | Performed by: INTERNAL MEDICINE

## 2021-11-06 ENCOUNTER — PATIENT MESSAGE (OUTPATIENT)
Dept: HEPATOLOGY | Facility: CLINIC | Age: 36
End: 2021-11-06
Payer: OTHER GOVERNMENT

## 2021-11-11 ENCOUNTER — PATIENT MESSAGE (OUTPATIENT)
Dept: HEPATOLOGY | Facility: CLINIC | Age: 36
End: 2021-11-11
Payer: OTHER GOVERNMENT

## 2021-11-11 ENCOUNTER — OFFICE VISIT (OUTPATIENT)
Dept: FAMILY MEDICINE | Facility: CLINIC | Age: 36
End: 2021-11-11
Payer: OTHER GOVERNMENT

## 2021-11-11 ENCOUNTER — PATIENT MESSAGE (OUTPATIENT)
Dept: OPTOMETRY | Facility: CLINIC | Age: 36
End: 2021-11-11
Payer: OTHER GOVERNMENT

## 2021-11-11 ENCOUNTER — PATIENT MESSAGE (OUTPATIENT)
Dept: FAMILY MEDICINE | Facility: CLINIC | Age: 36
End: 2021-11-11

## 2021-11-11 ENCOUNTER — PATIENT MESSAGE (OUTPATIENT)
Dept: ENDOCRINOLOGY | Facility: CLINIC | Age: 36
End: 2021-11-11
Payer: OTHER GOVERNMENT

## 2021-11-11 VITALS
SYSTOLIC BLOOD PRESSURE: 100 MMHG | OXYGEN SATURATION: 100 % | HEART RATE: 69 BPM | HEIGHT: 61 IN | DIASTOLIC BLOOD PRESSURE: 68 MMHG | WEIGHT: 176.56 LBS | BODY MASS INDEX: 33.34 KG/M2 | TEMPERATURE: 99 F

## 2021-11-11 DIAGNOSIS — H57.9 ALLERGIC EYE REACTION: ICD-10-CM

## 2021-11-11 DIAGNOSIS — Z86.69 HISTORY OF ITCHING OF EYE: ICD-10-CM

## 2021-11-11 DIAGNOSIS — T78.40XA ALLERGY, INITIAL ENCOUNTER: Primary | ICD-10-CM

## 2021-11-11 PROCEDURE — 99213 OFFICE O/P EST LOW 20 MIN: CPT | Mod: PBBFAC,PO | Performed by: FAMILY MEDICINE

## 2021-11-11 PROCEDURE — 99999 PR PBB SHADOW E&M-EST. PATIENT-LVL III: ICD-10-PCS | Mod: PBBFAC,,, | Performed by: FAMILY MEDICINE

## 2021-11-11 PROCEDURE — 99214 OFFICE O/P EST MOD 30 MIN: CPT | Mod: S$PBB,,, | Performed by: FAMILY MEDICINE

## 2021-11-11 PROCEDURE — 99214 PR OFFICE/OUTPT VISIT, EST, LEVL IV, 30-39 MIN: ICD-10-PCS | Mod: S$PBB,,, | Performed by: FAMILY MEDICINE

## 2021-11-11 PROCEDURE — 99999 PR PBB SHADOW E&M-EST. PATIENT-LVL III: CPT | Mod: PBBFAC,,, | Performed by: FAMILY MEDICINE

## 2021-11-11 RX ORDER — OLOPATADINE HYDROCHLORIDE 1 MG/ML
1 SOLUTION/ DROPS OPHTHALMIC 2 TIMES DAILY
Qty: 5 ML | Refills: 2 | Status: SHIPPED | OUTPATIENT
Start: 2021-11-11 | End: 2022-07-01

## 2021-12-07 ENCOUNTER — IMMUNIZATION (OUTPATIENT)
Dept: OBSTETRICS AND GYNECOLOGY | Facility: CLINIC | Age: 36
End: 2021-12-07
Payer: OTHER GOVERNMENT

## 2021-12-07 DIAGNOSIS — Z23 NEED FOR VACCINATION: Primary | ICD-10-CM

## 2021-12-07 PROCEDURE — 0004A COVID-19, MRNA, LNP-S, PF, 30 MCG/0.3 ML DOSE VACCINE: CPT | Mod: PBBFAC

## 2021-12-10 ENCOUNTER — TELEPHONE (OUTPATIENT)
Dept: HEPATOLOGY | Facility: CLINIC | Age: 36
End: 2021-12-10
Payer: OTHER GOVERNMENT

## 2021-12-16 ENCOUNTER — PATIENT MESSAGE (OUTPATIENT)
Dept: HEPATOLOGY | Facility: CLINIC | Age: 36
End: 2021-12-16
Payer: OTHER GOVERNMENT

## 2022-01-11 ENCOUNTER — PATIENT MESSAGE (OUTPATIENT)
Dept: OPTOMETRY | Facility: CLINIC | Age: 37
End: 2022-01-11
Payer: OTHER GOVERNMENT

## 2022-02-21 ENCOUNTER — PATIENT MESSAGE (OUTPATIENT)
Dept: HEPATOLOGY | Facility: CLINIC | Age: 37
End: 2022-02-21
Payer: OTHER GOVERNMENT

## 2022-03-10 ENCOUNTER — LAB VISIT (OUTPATIENT)
Dept: LAB | Facility: HOSPITAL | Age: 37
End: 2022-03-10
Attending: INTERNAL MEDICINE
Payer: OTHER GOVERNMENT

## 2022-03-10 DIAGNOSIS — R74.8 ELEVATED LIVER ENZYMES: ICD-10-CM

## 2022-03-10 DIAGNOSIS — Z87.59 HISTORY OF CHOLESTASIS DURING PREGNANCY: ICD-10-CM

## 2022-03-10 DIAGNOSIS — Z87.19 HISTORY OF CHOLESTASIS DURING PREGNANCY: ICD-10-CM

## 2022-03-10 LAB
ALBUMIN SERPL BCP-MCNC: 3.7 G/DL (ref 3.5–5.2)
ALP SERPL-CCNC: 113 U/L (ref 55–135)
ALT SERPL W/O P-5'-P-CCNC: 53 U/L (ref 10–44)
AST SERPL-CCNC: 40 U/L (ref 10–40)
BILIRUB DIRECT SERPL-MCNC: 0.1 MG/DL (ref 0.1–0.3)
BILIRUB SERPL-MCNC: 0.3 MG/DL (ref 0.1–1)
GGT SERPL-CCNC: 205 U/L (ref 8–55)
PROT SERPL-MCNC: 7.3 G/DL (ref 6–8.4)

## 2022-03-10 PROCEDURE — 82977 ASSAY OF GGT: CPT | Performed by: INTERNAL MEDICINE

## 2022-03-10 PROCEDURE — 80076 HEPATIC FUNCTION PANEL: CPT | Performed by: INTERNAL MEDICINE

## 2022-03-10 PROCEDURE — 82239 BILE ACIDS TOTAL: CPT | Performed by: INTERNAL MEDICINE

## 2022-03-11 LAB — BILE AC SERPL-SCNC: 2 MCMOL/L

## 2022-03-13 ENCOUNTER — PATIENT MESSAGE (OUTPATIENT)
Dept: FAMILY MEDICINE | Facility: CLINIC | Age: 37
End: 2022-03-13
Payer: OTHER GOVERNMENT

## 2022-03-14 ENCOUNTER — OFFICE VISIT (OUTPATIENT)
Dept: FAMILY MEDICINE | Facility: CLINIC | Age: 37
End: 2022-03-14
Payer: OTHER GOVERNMENT

## 2022-03-14 ENCOUNTER — PATIENT MESSAGE (OUTPATIENT)
Dept: FAMILY MEDICINE | Facility: CLINIC | Age: 37
End: 2022-03-14

## 2022-03-14 VITALS
HEART RATE: 78 BPM | SYSTOLIC BLOOD PRESSURE: 108 MMHG | WEIGHT: 177.38 LBS | DIASTOLIC BLOOD PRESSURE: 62 MMHG | OXYGEN SATURATION: 98 % | BODY MASS INDEX: 33.49 KG/M2 | HEIGHT: 61 IN | TEMPERATURE: 99 F

## 2022-03-14 DIAGNOSIS — E03.8 HYPOTHYROIDISM DUE TO HASHIMOTO'S THYROIDITIS: ICD-10-CM

## 2022-03-14 DIAGNOSIS — L02.416 ABSCESS OF LEFT THIGH: ICD-10-CM

## 2022-03-14 DIAGNOSIS — L02.416 ABSCESS OF LEFT THIGH: Primary | ICD-10-CM

## 2022-03-14 DIAGNOSIS — E06.3 HYPOTHYROIDISM DUE TO HASHIMOTO'S THYROIDITIS: ICD-10-CM

## 2022-03-14 PROCEDURE — 99999 PR PBB SHADOW E&M-EST. PATIENT-LVL IV: CPT | Mod: PBBFAC,,, | Performed by: NURSE PRACTITIONER

## 2022-03-14 PROCEDURE — 99999 PR PBB SHADOW E&M-EST. PATIENT-LVL IV: ICD-10-PCS | Mod: PBBFAC,,, | Performed by: NURSE PRACTITIONER

## 2022-03-14 PROCEDURE — 99214 OFFICE O/P EST MOD 30 MIN: CPT | Mod: S$PBB,,, | Performed by: NURSE PRACTITIONER

## 2022-03-14 PROCEDURE — 99214 OFFICE O/P EST MOD 30 MIN: CPT | Mod: PBBFAC,PO | Performed by: NURSE PRACTITIONER

## 2022-03-14 PROCEDURE — 99214 PR OFFICE/OUTPT VISIT, EST, LEVL IV, 30-39 MIN: ICD-10-PCS | Mod: S$PBB,,, | Performed by: NURSE PRACTITIONER

## 2022-03-14 RX ORDER — SULFAMETHOXAZOLE AND TRIMETHOPRIM 800; 160 MG/1; MG/1
1 TABLET ORAL DAILY
Qty: 14 TABLET | Refills: 0 | Status: SHIPPED | OUTPATIENT
Start: 2022-03-14 | End: 2022-03-28

## 2022-03-14 RX ORDER — SULFAMETHOXAZOLE AND TRIMETHOPRIM 800; 160 MG/1; MG/1
1 TABLET ORAL DAILY
Qty: 14 TABLET | Refills: 0 | Status: SHIPPED | OUTPATIENT
Start: 2022-03-14 | End: 2022-03-14

## 2022-03-14 NOTE — PROGRESS NOTES
Chief Complaint  Chief Complaint   Patient presents with    Recurrent Skin Infections    Advice Only     Hair loss       HPI  Shanae Vo is a 36 y.o. female with medical diagnoses as listed in the medical history and problem list that presents for Abscess at inner left thigh. This patient is ne to me.     1. Abscess at inner left thigh x 1 week: Reports abscess appeared to go away a week ago but she wore a dress and the friction cause irritation. Denies any drainage Reports pain. Used hot compresses with some relief. Denies fever, chills or night sweats. Reports  had recent Vasectomy performed.        PAST MEDICAL HISTORY:  Past Medical History:   Diagnosis Date    Elevated liver enzymes 3/3/2021    Finger infection     right finger- patient will go to urgent care and take care of it    Thyroid disease     hypothyroid       PAST SURGICAL HISTORY:  Past Surgical History:   Procedure Laterality Date    BIOPSY OF LIVER WITH ULTRASOUND GUIDANCE N/A 2021    Procedure: BIOPSY, LIVER, WITH US GUIDANCE;  Surgeon: Baldev Chino MD;  Location: [facility] CATH LAB;  Service: Radiology;  Laterality: N/A;     SECTION N/A 10/12/2020    Procedure:  SECTION;  Surgeon: Harmony Singh MD;  Location: [facility] L&D;  Service: OB/GYN;  Laterality: N/A;     SECTION      DIAGNOSTIC LAPAROSCOPY N/A 10/28/2019    Procedure: LAPAROSCOPY, DIAGNOSTIC;  Surgeon: Luis Armando Vega MD;  Location: [facility] OR;  Service: OB/GYN;  Laterality: N/A;    LAPAROSCOPIC SURGICAL REMOVAL OF CYST OF OVARY Left 10/28/2019    Procedure: EXCISION, CYST, OVARY, LAPAROSCOPIC, LEFT;  Surgeon: Luis Armando Vega MD;  Location: [facility] OR;  Service: OB/GYN;  Laterality: Left;    NEEDLE LOCALIZATION N/A 2021    Procedure: NEEDLE LOCALIZATION;  Surgeon: Baldev Chino MD;  Location: [facility] CATH LAB;  Service: Radiology;  Laterality: N/A;    WISDOM TOOTH EXTRACTION         SOCIAL HISTORY:  Social History      Socioeconomic History    Marital status:    Tobacco Use    Smoking status: Never Smoker    Smokeless tobacco: Never Used   Substance and Sexual Activity    Alcohol use: Not Currently     Comment: social    Drug use: Never    Sexual activity: Yes     Partners: Male     Birth control/protection: None       FAMILY HISTORY:  Family History   Problem Relation Age of Onset    Colon cancer Paternal Uncle     Breast cancer Neg Hx     Ovarian cancer Neg Hx        ALLERGIES AND MEDICATIONS: updated and reviewed.  Review of patient's allergies indicates:   Allergen Reactions    Shrimp Nausea And Vomiting     Current Outpatient Medications   Medication Sig Dispense Refill    levothyroxine (SYNTHROID) 75 MCG tablet Take 1 tablet (75 mcg total) by mouth before breakfast. Please take as a single dose, on an empty stomach with glass of water, one-half to one hour before breakfast. 90 tablet 3    ibuprofen (ADVIL ORAL) Take by mouth as needed.      olopatadine (PATANOL) 0.1 % ophthalmic solution Place 1 drop into both eyes 2 (two) times daily. (Patient not taking: Reported on 3/14/2022) 5 mL 2    sulfamethoxazole-trimethoprim 800-160mg (BACTRIM DS) 800-160 mg Tab Take 1 tablet by mouth once daily. for 14 days 14 tablet 0    tobramycin-dexamethasone 0.3-0.1% (TOBRADEX) 0.3-0.1 % DrpS        No current facility-administered medications for this visit.         ROS  Review of Systems   Constitutional: Negative for appetite change, chills, fatigue and fever.   HENT: Negative for congestion, ear pain, postnasal drip, rhinorrhea, sinus pressure, sneezing and sore throat.    Respiratory: Negative for shortness of breath.    Cardiovascular: Negative for chest pain and palpitations.   Gastrointestinal: Negative for abdominal pain, constipation, diarrhea, nausea and vomiting.   Genitourinary: Negative for dysuria, frequency, hematuria and urgency.   Musculoskeletal: Negative for arthralgias.   Skin: Positive for color  "change and wound.   Neurological: Negative for headaches.   Psychiatric/Behavioral: Negative for sleep disturbance.           Physical Exam  Vitals:    03/14/22 1121   BP: 108/62   Pulse: 78   Temp: 98.5 °F (36.9 °C)   TempSrc: Oral   SpO2: 98%   Weight: 80.4 kg (177 lb 5.8 oz)   Height: 5' 1" (1.549 m)    Body mass index is 33.51 kg/m².  Weight: 80.4 kg (177 lb 5.8 oz)   Height: 5' 1" (154.9 cm)   Physical Exam  Constitutional:       General: She is not in acute distress.  HENT:      Head: Normocephalic and atraumatic.      Right Ear: External ear normal.      Left Ear: External ear normal.      Nose: Nose normal.   Eyes:      Pupils: Pupils are equal, round, and reactive to light.   Cardiovascular:      Rate and Rhythm: Normal rate and regular rhythm.   Pulmonary:      Effort: Pulmonary effort is normal. No respiratory distress.      Breath sounds: Normal breath sounds. No wheezing.   Abdominal:      General: Bowel sounds are normal.      Palpations: Abdomen is soft.   Musculoskeletal:      Cervical back: Neck supple.   Lymphadenopathy:      Cervical: No cervical adenopathy.   Skin:     General: Skin is warm and dry.      Capillary Refill: Capillary refill takes less than 2 seconds.      Findings: Abscess and lesion present.   Neurological:      General: No focal deficit present.      Mental Status: She is alert and oriented to person, place, and time. Mental status is at baseline.   Psychiatric:         Mood and Affect: Mood normal.             Health Maintenance       Date Due Completion Date    Pneumococcal Vaccines (Age 0-64) (1 of 2 - PPSV23) Never done ---    Influenza Vaccine (1) Never done ---    Cervical Cancer Screening 05/03/2026 5/3/2021    TETANUS VACCINE 10/04/2030 10/4/2020            Assessment & Plan  Problem List Items Addressed This Visit        Endocrine    Hypothyroidism due to Hashimoto's thyroiditis  -The current medical regimen is effective;  continue present plan and medications.     "   Other Visit Diagnoses     Abscess of left thigh    -  Primary  -We discussed proper administration of Bactrim, adverse effects and side effects with education given for reinforcement  - I advised patient to schedule an appt if Bactrim with continued hot compresses does not resolve hot compress   - We discussed ER precautions  -Patient verbalized understanding of everything discussed in clinic.    Relevant Medications    sulfamethoxazole-trimethoprim 800-160mg (BACTRIM DS) 800-160 mg Tab            Health Maintenance reviewed: Denied HM at this time.     Follow-up: As needed

## 2022-03-14 NOTE — TELEPHONE ENCOUNTER
Please call and schedule patient with any provider that has an earliest available opening today as I'm booked for the day, if no one else is available and patient feels the need to be seen right away, please have pt go to Urgent Care for further evaluation. Thanks.

## 2022-03-14 NOTE — TELEPHONE ENCOUNTER
Patient's insurance is no longer accepted at [facility]. Medication needs to be sent to [facility].

## 2022-04-13 ENCOUNTER — PATIENT MESSAGE (OUTPATIENT)
Dept: HEPATOLOGY | Facility: CLINIC | Age: 37
End: 2022-04-13

## 2022-04-13 ENCOUNTER — OFFICE VISIT (OUTPATIENT)
Dept: HEPATOLOGY | Facility: CLINIC | Age: 37
End: 2022-04-13
Payer: OTHER GOVERNMENT

## 2022-04-13 ENCOUNTER — TELEPHONE (OUTPATIENT)
Dept: HEPATOLOGY | Facility: CLINIC | Age: 37
End: 2022-04-13

## 2022-04-13 DIAGNOSIS — R74.8 ELEVATED LIVER ENZYMES: ICD-10-CM

## 2022-04-13 DIAGNOSIS — O26.649 CHOLESTASIS DURING PREGNANCY, ANTEPARTUM: Primary | ICD-10-CM

## 2022-04-13 PROCEDURE — 99213 PR OFFICE/OUTPT VISIT, EST, LEVL III, 20-29 MIN: ICD-10-PCS | Mod: 95,,, | Performed by: INTERNAL MEDICINE

## 2022-04-13 PROCEDURE — 99213 OFFICE O/P EST LOW 20 MIN: CPT | Mod: 95,,, | Performed by: INTERNAL MEDICINE

## 2022-04-13 NOTE — PROGRESS NOTES
The patient location is: home  The chief complaint leading to consultation is: f/u of liver biopsy    Visit type: audiovisual    Face to Face time with patient: 20 minutes  30 minutes of total time spent on the encounter, which includes face to face time and non-face to face time preparing to see the patient (eg, review of tests), Obtaining and/or reviewing separately obtained history, Documenting clinical information in the electronic or other health record, Independently interpreting results (not separately reported) and communicating results to the patient/family/caregiver, or Care coordination (not separately reported).     Each patient to whom he or she provides medical services by telemedicine is:  (1) informed of the relationship between the physician and patient and the respective role of any other health care provider with respect to management of the patient; and (2) notified that he or she may decline to receive medical services by telemedicine and may withdraw from such care at any time.    Notes:  HEPATOLOGY FOLLOW UP    Referring Physician: New Feliz MD  Current Corresponding Physician: New Feliz MD    Shanae Vo is here for follow up of a hx of presumed intrahepatic cholestasis of pregnancy    HPI  This patient saw Dr Brizuela, Hepatology at 22 weeks gestation 8/6/20.  At that time she had pruritus that started at ~18 weeks. Bile acids were elevated, but subsequently fluctuated (see below) She also saw fetal maternal high risk OB at [facility]. No prior hx of itching, cholestasis or liver disease. No family hx of liver disease.    He ordered serologies/autoimmune markers, bile acids as well as liver ultrasound w/ doppler to r/o other possible causes of liver disease for the sake of thoroughness in work-up. His advice was: if bile acids remained high and itching persisted, his plan was to start her on ursodeoxycholic acid. She was not seen in f/u. Because bile acids fluctuated, it was  not clear that she truly had intrahepatic cholestasis of pregnancy and was therefore not initially started on joesph. Towards the end of her pregnancy, her bile acids nah and she was started on joesph with an improvement in her itching.. The itching resolved with delivery.    Labs 20: Tbil 0.2, ALT 42, AST 34, ALKP 152.   HAV IgM-, HBsAG-, HCV Ab-, ASMA-, AMA-  Bile acids 20: 9. Repeat bile acids 20: 4;.20: elevated at 43.7;  down to 26  Labs 10/10/21 (POD#1): Tbil 0.3, ALT 17, AST 33, ALKP 168    Abdomen US with doppler 20: normal    She was admitted to the antepartum service 20 for serial BP monitoring and subsequently delivered 10/9/20 (31w1d) by  due to preeclampsia, severe, cholestasis of pregnancy, NRFHT, groin abscess.     Interval History:   Shanae Vo was seen by video visit 3/3/21 for a new elevation in liver enzymes. After her delivery her pruritus/cholestasis resolved and it did not recur. Because she was feeling very fatigued, her liver enzymes and thryoid were checked. Her enzymes were up:  Labs 21: Tbil 0.4, , , ALKP 373    I recommended a full serologic w/u and an MRI/MRCP:  Labs 3/8/21: Tbil 0.4 ALT 69, AST 40, ALKP 262, bile acids normal at 2  Ceruloplasmin normal at 33, peth negative, PINEDA-, ASMA-, AMA-, IgG normal at 1034, alhpa 1 antitrypsin 114 with normal phenotype of MM  HCV RNA-, HBsAg-, HBcAb-, HBsAb-, HAV IgM-, HAV IgG-  Iron sat 13%, ferritin 21  Of note had seen a rheumatologist x 2 in the past. Had abnormal serologies for both lupus and RA but not diagnosed with either disease. Does have hashimoto's thyroiditis.    MRI/MRCP 3/9/21: Liver: Normal homogeneous background signal.  No focal lesions.  Hepatic and portal veins are patent; Biliary: Gallbladder is decompressed. No filling defects.  No intrahepatic or extrahepatic biliary dilatation; Pancreas: Unremarkable.  No pancreatic ductal dilatation.    She had a liver biopsy  6/18/21 that suggests she may have PFIC:       Genetic testing: negative; more extensive deferred    Since her 11/21 visit:  Labs 11/1/21: ALT 32, AST 26, ALKP 109, bile acids 1  3/10/22: ALT 53, aST 40, ALKP 113, , bile acids 2    She continues to feel well with no abdo pain, N/V or pruritus. Fatigue has not recurred.    Outpatient Encounter Medications as of 4/13/2022   Medication Sig Dispense Refill    ibuprofen (ADVIL ORAL) Take by mouth as needed.      levothyroxine (SYNTHROID) 75 MCG tablet Take 1 tablet (75 mcg total) by mouth before breakfast. Please take as a single dose, on an empty stomach with glass of water, one-half to one hour before breakfast. 90 tablet 3    olopatadine (PATANOL) 0.1 % ophthalmic solution Place 1 drop into both eyes 2 (two) times daily. (Patient not taking: Reported on 3/14/2022) 5 mL 2    tobramycin-dexamethasone 0.3-0.1% (TOBRADEX) 0.3-0.1 % DrpS        No facility-administered encounter medications on file as of 4/13/2022.     Review of patient's allergies indicates:   Allergen Reactions    Shrimp Nausea And Vomiting     Past Medical History:   Diagnosis Date    Elevated liver enzymes 3/3/2021    Finger infection     right finger- patient will go to urgent care and take care of it    Thyroid disease     hypothyroid       Review of Systems   Constitutional: Negative.    HENT: Negative.    Eyes: Negative.    Respiratory: Negative.    Cardiovascular: Negative.    Gastrointestinal: Negative.    Genitourinary: Negative.    Musculoskeletal: Negative.    Skin: Negative.    Neurological: Negative.    Psychiatric/Behavioral: Negative.        Physical Exam        Lab Results   Component Value Date    GLU 89 11/01/2021    BUN 10 11/01/2021    CREATININE 0.7 11/01/2021    CALCIUM 10.3 11/01/2021     11/01/2021    K 4.0 11/01/2021     11/01/2021    PROT 7.3 03/10/2022    CO2 24 11/01/2021    ANIONGAP 9 11/01/2021    WBC 9.23 06/24/2021    RBC 4.87 06/24/2021    HGB  14.5 06/24/2021    HCT 43.0 06/24/2021    MCV 88 06/24/2021    MCH 29.8 06/24/2021    MCHC 33.7 06/24/2021     Lab Results   Component Value Date    RDW 12.5 06/24/2021     06/24/2021    MPV 9.3 06/24/2021    GRAN 6.2 06/24/2021    GRAN 66.7 06/24/2021    LYMPH 2.2 06/24/2021    LYMPH 24.1 06/24/2021    MONO 0.7 06/24/2021    MONO 7.8 06/24/2021    EOSINOPHIL 0.7 06/24/2021    BASOPHIL 0.4 06/24/2021    EOS 0.1 06/24/2021    BASO 0.04 06/24/2021    APTT 23.6 10/09/2020    GROUPTRH A NEG 10/10/2020    CHOL 225 (H) 06/03/2021    TRIG 88 06/03/2021    HDL 54 06/03/2021    CHOLHDL 24.0 06/03/2021    TOTALCHOLEST 4.2 06/03/2021    ALBUMIN 3.7 03/10/2022    BILIDIR 0.1 03/10/2022    AST 40 03/10/2022    ALT 53 (H) 03/10/2022    ALKPHOS 113 03/10/2022    MG 7.2 (HH) 10/09/2020    LABPROT 10.3 03/08/2021    INR 1.0 03/08/2021       Assessment and Plan:  Shanae Vo is a 36 y.o. female with elevated liver enzymes and a liver biopsy that shows marked reduction of BSEP expression. Importantly, there is no fibrosis. Genetic testing was negative. More detailed genetic testing has been deferred. There are reports of pregnancy unmasking genetic predisposition to IHCP such as in ABCB4 and ABCB11 which are implicated in progressive familial intrahepatic cholestasis (PFIC) and benign recurrent intrahepatic cholestasis (BRIC).     Continue to recommend hepatic panel, GGT and bile acids be checked every 4 months    Return 6 months

## 2022-04-28 ENCOUNTER — OFFICE VISIT (OUTPATIENT)
Dept: OPTOMETRY | Facility: CLINIC | Age: 37
End: 2022-04-28
Payer: OTHER GOVERNMENT

## 2022-04-28 DIAGNOSIS — H01.00A BLEPHARITIS OF UPPER AND LOWER EYELIDS OF BOTH EYES, UNSPECIFIED TYPE: Primary | ICD-10-CM

## 2022-04-28 DIAGNOSIS — H01.00B BLEPHARITIS OF UPPER AND LOWER EYELIDS OF BOTH EYES, UNSPECIFIED TYPE: Primary | ICD-10-CM

## 2022-04-28 PROCEDURE — 92002 INTRM OPH EXAM NEW PATIENT: CPT | Mod: S$PBB,,, | Performed by: OPTOMETRIST

## 2022-04-28 PROCEDURE — 99999 PR PBB SHADOW E&M-EST. PATIENT-LVL II: ICD-10-PCS | Mod: PBBFAC,,, | Performed by: OPTOMETRIST

## 2022-04-28 PROCEDURE — 99212 OFFICE O/P EST SF 10 MIN: CPT | Mod: PBBFAC,PO | Performed by: OPTOMETRIST

## 2022-04-28 PROCEDURE — 92002 PR EYE EXAM, NEW PATIENT,INTERMED: ICD-10-PCS | Mod: S$PBB,,, | Performed by: OPTOMETRIST

## 2022-04-28 PROCEDURE — 99999 PR PBB SHADOW E&M-EST. PATIENT-LVL II: CPT | Mod: PBBFAC,,, | Performed by: OPTOMETRIST

## 2022-04-28 NOTE — PROGRESS NOTES
Subjective:       Patient ID: Shanae Vo is a 36 y.o. female      Chief Complaint   Patient presents with    Eye Problem     History of Present Illness  Dls: 1 yr     37 y/o female presents today with c/o x 5 months ago itching ou white bumps on lower lids ou. Pt also had a stye that has resolved. Pt states no problem with eyes now but still wants to have ou check to make sure its ok.     Eye meds  None      Assessment/Plan:     1. Blepharitis of upper and lower eyelids of both eyes, unspecified type  Lids scrubs QD-BID OU. AT BID OU. Can use pataday for itchy eyes PRN.     Follow up if symptoms worsen or fail to improve.

## 2022-05-02 ENCOUNTER — LAB VISIT (OUTPATIENT)
Dept: LAB | Facility: HOSPITAL | Age: 37
End: 2022-05-02
Attending: HOSPITALIST
Payer: OTHER GOVERNMENT

## 2022-05-02 DIAGNOSIS — E03.8 HYPOTHYROIDISM DUE TO HASHIMOTO'S THYROIDITIS: ICD-10-CM

## 2022-05-02 DIAGNOSIS — E06.3 HYPOTHYROIDISM DUE TO HASHIMOTO'S THYROIDITIS: ICD-10-CM

## 2022-05-02 LAB
T4 FREE SERPL-MCNC: 1.05 NG/DL (ref 0.71–1.51)
TSH SERPL DL<=0.005 MIU/L-ACNC: 1.76 UIU/ML (ref 0.4–4)

## 2022-05-02 PROCEDURE — 36415 COLL VENOUS BLD VENIPUNCTURE: CPT | Performed by: HOSPITALIST

## 2022-05-02 PROCEDURE — 84439 ASSAY OF FREE THYROXINE: CPT | Performed by: HOSPITALIST

## 2022-05-02 PROCEDURE — 84443 ASSAY THYROID STIM HORMONE: CPT | Performed by: HOSPITALIST

## 2022-06-23 ENCOUNTER — PATIENT MESSAGE (OUTPATIENT)
Dept: DERMATOLOGY | Facility: CLINIC | Age: 37
End: 2022-06-23
Payer: OTHER GOVERNMENT

## 2022-06-30 ENCOUNTER — PATIENT MESSAGE (OUTPATIENT)
Dept: DERMATOLOGY | Facility: CLINIC | Age: 37
End: 2022-06-30
Payer: OTHER GOVERNMENT

## 2022-06-30 ENCOUNTER — PATIENT MESSAGE (OUTPATIENT)
Dept: FAMILY MEDICINE | Facility: CLINIC | Age: 37
End: 2022-06-30
Payer: OTHER GOVERNMENT

## 2022-07-01 ENCOUNTER — OFFICE VISIT (OUTPATIENT)
Dept: FAMILY MEDICINE | Facility: CLINIC | Age: 37
End: 2022-07-01
Payer: OTHER GOVERNMENT

## 2022-07-01 ENCOUNTER — PATIENT MESSAGE (OUTPATIENT)
Dept: FAMILY MEDICINE | Facility: CLINIC | Age: 37
End: 2022-07-01

## 2022-07-01 ENCOUNTER — TELEPHONE (OUTPATIENT)
Dept: FAMILY MEDICINE | Facility: CLINIC | Age: 37
End: 2022-07-01

## 2022-07-01 DIAGNOSIS — L03.116 CELLULITIS OF LEFT LOWER EXTREMITY: Primary | ICD-10-CM

## 2022-07-01 DIAGNOSIS — Z00.00 ANNUAL PHYSICAL EXAM: ICD-10-CM

## 2022-07-01 PROCEDURE — 99214 OFFICE O/P EST MOD 30 MIN: CPT | Mod: 95,,, | Performed by: FAMILY MEDICINE

## 2022-07-01 PROCEDURE — 99214 PR OFFICE/OUTPT VISIT, EST, LEVL IV, 30-39 MIN: ICD-10-PCS | Mod: 95,,, | Performed by: FAMILY MEDICINE

## 2022-07-01 RX ORDER — DOXYCYCLINE HYCLATE 100 MG
100 TABLET ORAL EVERY 12 HOURS
Qty: 20 TABLET | Refills: 0 | Status: SHIPPED | OUTPATIENT
Start: 2022-07-01 | End: 2022-07-14

## 2022-07-01 NOTE — TELEPHONE ENCOUNTER
[facility] The patient states that she has a boil and it popped but is still very painful.

## 2022-07-01 NOTE — PROGRESS NOTES
The patient location is: home  The chief complaint leading to consultation is: Recurrent Skin Infections    Visit type: Virtual visit with synchronous audio and video  Total time spent with patient: 16 minutes  Each patient to whom he or she provides medical services by telemedicine is:  (1) informed of the relationship between the physician and patient and the respective role of any other health care provider with respect to management of the patient; and (2) notified that he or she may decline to receive medical services by telemedicine and may withdraw from such care at any time.     Office Visit    Patient Name: Shanae Vo    : 1985  MRN: [id number]      Assessment/Plan:  Shanae Vo is a 36 y.o. female who presents today for :    Cellulitis of left lower extremity  -     doxycycline (VIBRA-TABS) 100 MG tablet; Take 1 tablet (100 mg total) by mouth every 12 (twelve) hours.  Dispense: 20 tablet; Refill: 0  -Advised keeping area dry and clean with good skin hygiene, wash all sheets/towels/clothes after each episode, dispose of razors used in area, avoid scratching affected area, may do intermittent warm water soaks for additional comfort and elevate limb to help reduce swelling.   -Start Antibiotic, may use Tylenol PRN for pain.       Follow up for worsening Sx          This note was created by combination of typed  and MModal dictation.  Transcription errors may be present.  If there are any questions, please contact me.      ----------------------------------------------------------------------------------------------------------------------      HPI:  Patient Care Team:  New Feliz MD as PCP - General (Family Medicine)    Shanae is a 36 y.o. 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 She has surrounding skin swelling/redness and tenderness. Her boil popped this morning on its own, with ozzing blood and minimal pus. She states the pain has improved. She had been using Bacitracin ointment the past few days. She was previously treated for a similar boil in the same area over 3 months ago with oral antibiotic. She denies any fever/chills/N/V/malaise.      Answers for HPI/ROS submitted by the patient on 2022  Chronicity: recurrent  Onset: yesterday  Progression since onset: waxing and waning  Characteristics: blistering  Exposed to: nothing  anorexia: No  congestion: No  cough: No  diarrhea: No  eye pain: No  facial edema: No  fatigue: No  fever: No  joint pain: No  nail changes: No  rhinorrhea: No  shortness of breath: No  sore throat: No  vomiting: No  Treatments tried: topical steroids  Improvement on treatment: mild  asthma: No  allergies: No  eczema: No  varicella: No          Patient Active Problem List   Diagnosis    Endometrioma of ovary    Itching    Hypothyroidism due to Hashimoto's thyroiditis    History of pre-eclampsia    History of cholestasis during pregnancy    S/P  section    Elevated liver enzymes    Cholestasis during pregnancy    Liver dysfunction    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       Current Outpatient Medications:     doxycycline (VIBRA-TABS) 100 MG tablet, Take 1 tablet (100 mg total) by mouth every 12 (twelve) hours., Disp: 20 tablet, Rfl: 0    ibuprofen (ADVIL ORAL), Take by mouth as needed., Disp: , Rfl:     levothyroxine (SYNTHROID) 75 MCG tablet, Take 1 tablet  (75 mcg total) by mouth before breakfast. Please take as a single dose, on an empty stomach with glass of water, one-half to one hour before breakfast., Disp: 90 tablet, Rfl: 3    Past Surgical History:   Procedure Laterality Date    BIOPSY OF LIVER WITH ULTRASOUND GUIDANCE N/A 2021    Procedure: BIOPSY, LIVER, WITH US GUIDANCE;  Surgeon: Baldev Chino MD;  Location: [facility] CATH LAB;  Service: Radiology;  Laterality: N/A;     SECTION N/A 10/12/2020    Procedure:  SECTION;  Surgeon: Harmony Singh MD;  Location: [facility] L&D;  Service: OB/GYN;  Laterality: N/A;     SECTION      DIAGNOSTIC LAPAROSCOPY N/A 10/28/2019    Procedure: LAPAROSCOPY, DIAGNOSTIC;  Surgeon: Luis Armando Vega MD;  Location: [facility] OR;  Service: OB/GYN;  Laterality: N/A;    LAPAROSCOPIC SURGICAL REMOVAL OF CYST OF OVARY Left 10/28/2019    Procedure: EXCISION, CYST, OVARY, LAPAROSCOPIC, LEFT;  Surgeon: Luis Armando Vega MD;  Location: [facility] OR;  Service: OB/GYN;  Laterality: Left;    NEEDLE LOCALIZATION N/A 2021    Procedure: NEEDLE LOCALIZATION;  Surgeon: Baldev Chino MD;  Location: [facility] CATH LAB;  Service: Radiology;  Laterality: N/A;    WISDOM TOOTH EXTRACTION         Family History   Problem Relation Age of Onset    Colon cancer Paternal Uncle     Cataracts Mother     No Known Problems Father     No Known Problems Sister     No Known Problems Brother     No Known Problems Maternal Aunt     No Known Problems Maternal Uncle     No Known Problems Paternal Aunt     No Known Problems Maternal Grandmother     No Known Problems Maternal Grandfather     No Known Problems Paternal Grandmother     No Known Problems Paternal Grandfather     Breast cancer Neg Hx     Ovarian cancer Neg Hx        Social History     Socioeconomic History    Marital status:    Tobacco Use    Smoking status: Never Smoker    Smokeless tobacco: Never Used   Substance and Sexual Activity    Alcohol use:  Not Currently     Comment: social    Drug use: Never    Sexual activity: Yes     Partners: Male     Birth control/protection: None             Allergies   Review of patient's allergies indicates:   Allergen Reactions    Shrimp Nausea And Vomiting             Review of Systems  See HPI      Physical Exam  There were no vitals taken for this visit.    GEN: NAD  SKIN: oval-shaped induration with central erythema located in L medial thigh with minimal drainage.

## 2022-07-01 NOTE — TELEPHONE ENCOUNTER
Patient would like to know if she could use a electric razor or does it cut the skin like a regular razor? Please advise!

## 2022-07-04 ENCOUNTER — PATIENT MESSAGE (OUTPATIENT)
Dept: FAMILY MEDICINE | Facility: CLINIC | Age: 37
End: 2022-07-04
Payer: OTHER GOVERNMENT

## 2022-07-04 DIAGNOSIS — L03.116 CELLULITIS OF LEFT LOWER EXTREMITY: Primary | ICD-10-CM

## 2022-07-05 RX ORDER — SULFAMETHOXAZOLE AND TRIMETHOPRIM 800; 160 MG/1; MG/1
1 TABLET ORAL 2 TIMES DAILY
Qty: 28 TABLET | Refills: 0 | Status: SHIPPED | OUTPATIENT
Start: 2022-07-05 | End: 2022-07-14

## 2022-07-05 NOTE — TELEPHONE ENCOUNTER
Patient was informed that an electric will provider a smoother cut, as well as less skin irritations and less risk of skin nicks.

## 2022-07-05 NOTE — TELEPHONE ENCOUNTER
Communciated with patient - her Sx have stopped after she discontinued Doxycycline. Will switch her to Bactrim, which she had tolerated before. Patient voices understanding and appreciates the call. No other questions at this time.    Cellulitis of left lower extremity  -     sulfamethoxazole-trimethoprim 800-160mg (BACTRIM DS) 800-160 mg Tab; Take 1 tablet by mouth 2 (two) times daily.  Dispense: 28 tablet; Refill: 0

## 2022-07-13 ENCOUNTER — PATIENT MESSAGE (OUTPATIENT)
Dept: DERMATOLOGY | Facility: CLINIC | Age: 37
End: 2022-07-13
Payer: OTHER GOVERNMENT

## 2022-07-14 ENCOUNTER — PATIENT MESSAGE (OUTPATIENT)
Dept: ADMINISTRATIVE | Facility: CLINIC | Age: 37
End: 2022-07-14
Payer: OTHER GOVERNMENT

## 2022-07-14 ENCOUNTER — PATIENT MESSAGE (OUTPATIENT)
Dept: FAMILY MEDICINE | Facility: CLINIC | Age: 37
End: 2022-07-14

## 2022-07-14 ENCOUNTER — OFFICE VISIT (OUTPATIENT)
Dept: FAMILY MEDICINE | Facility: CLINIC | Age: 37
End: 2022-07-14
Payer: OTHER GOVERNMENT

## 2022-07-14 ENCOUNTER — HOSPITAL ENCOUNTER (EMERGENCY)
Facility: HOSPITAL | Age: 37
Discharge: HOME | End: 2022-07-14
Attending: EMERGENCY MEDICINE
Payer: OTHER GOVERNMENT

## 2022-07-14 VITALS
OXYGEN SATURATION: 99 % | RESPIRATION RATE: 20 BRPM | DIASTOLIC BLOOD PRESSURE: 81 MMHG | BODY MASS INDEX: 32.1 KG/M2 | SYSTOLIC BLOOD PRESSURE: 185 MMHG | TEMPERATURE: 99 F | HEIGHT: 61 IN | WEIGHT: 170 LBS | HEART RATE: 94 BPM

## 2022-07-14 DIAGNOSIS — Z20.822 SUSPECTED COVID-19 VIRUS INFECTION: ICD-10-CM

## 2022-07-14 DIAGNOSIS — U07.1 COVID-19 VIRUS INFECTION: Primary | ICD-10-CM

## 2022-07-14 DIAGNOSIS — R05.9 COUGH: ICD-10-CM

## 2022-07-14 DIAGNOSIS — R06.02 SOB (SHORTNESS OF BREATH): ICD-10-CM

## 2022-07-14 LAB
B-HCG UR QL: NEGATIVE
CTP QC/QA: YES
CTP QC/QA: YES
SARS-COV-2 RDRP RESP QL NAA+PROBE: POSITIVE

## 2022-07-14 PROCEDURE — 99284 EMERGENCY DEPT VISIT MOD MDM: CPT | Mod: CR,,, | Performed by: NURSE PRACTITIONER

## 2022-07-14 PROCEDURE — 99214 OFFICE O/P EST MOD 30 MIN: CPT | Mod: 95,,, | Performed by: FAMILY MEDICINE

## 2022-07-14 PROCEDURE — 99283 EMERGENCY DEPT VISIT LOW MDM: CPT | Mod: 25

## 2022-07-14 PROCEDURE — 99214 PR OFFICE/OUTPT VISIT, EST, LEVL IV, 30-39 MIN: ICD-10-PCS | Mod: 95,,, | Performed by: FAMILY MEDICINE

## 2022-07-14 PROCEDURE — 81025 URINE PREGNANCY TEST: CPT | Performed by: NURSE PRACTITIONER

## 2022-07-14 PROCEDURE — 99284 PR EMERGENCY DEPT VISIT,LEVEL IV: ICD-10-PCS | Mod: CR,,, | Performed by: NURSE PRACTITIONER

## 2022-07-14 PROCEDURE — U0002 COVID-19 LAB TEST NON-CDC: HCPCS | Performed by: NURSE PRACTITIONER

## 2022-07-14 RX ORDER — PROMETHAZINE HYDROCHLORIDE AND DEXTROMETHORPHAN HYDROBROMIDE 6.25; 15 MG/5ML; MG/5ML
5 SYRUP ORAL 3 TIMES DAILY PRN
Qty: 118 ML | Refills: 1 | Status: SHIPPED | OUTPATIENT
Start: 2022-07-14 | End: 2022-07-21 | Stop reason: ALTCHOICE

## 2022-07-14 RX ORDER — ALBUTEROL SULFATE 90 UG/1
2 AEROSOL, METERED RESPIRATORY (INHALATION) EVERY 4 HOURS PRN
Qty: 18 G | Refills: 2 | Status: SHIPPED | OUTPATIENT
Start: 2022-07-14 | End: 2022-11-03

## 2022-07-14 NOTE — ED TRIAGE NOTES
Pt presents to the ED c/o SOB x 2 days. Pt's son is COVID+, took an at home test, COVID+. +productive cough, otalgia, weakness, fatigue.

## 2022-07-14 NOTE — PROGRESS NOTES
The patient location is: home  The chief complaint leading to consultation is: COVID-19 Concerns    Visit type: Virtual visit with synchronous audio and video  Total time spent with patient: 16 minutes  Each patient to whom he or she provides medical services by telemedicine is:  (1) informed of the relationship between the physician and patient and the respective role of any other health care provider with respect to management of the patient; and (2) notified that he or she may decline to receive medical services by telemedicine and may withdraw from such care at any time.     Office Visit    Patient Name: Shanae Vo    : 1985  MRN: [id number]      Assessment/Plan:  Shanae Vo is a 36 y.o. female who presents today for :    COVID-19 virus infection  SOB (shortness of breath)  -     albuterol (PROVENTIL/VENTOLIN HFA) 90 mcg/actuation inhaler; Inhale 2 puffs into the lungs every 4 (four) hours as needed for Wheezing or Shortness of Breath. Rescue  Dispense: 18 g; Refill: 2  Cough  -     promethazine-dextromethorphan (PROMETHAZINE-DM) 6.25-15 mg/5 mL Syrp; Take 5 mLs by mouth 3 (three) times daily as needed (cough).  Dispense: 118 mL; Refill: 1    -worsening dyspnea, will have her seen in the ER for further evaluation, likely will need to be sent for EUA infusion. I offered to call ambulance for her as she is alone at home with her baby and has no family close by - patient states she will call ambulate herself.    Follow up in office after her ER evaluation. Patient voices understanding and agrees with plan.              This note was created by combination of typed  and MModal dictation.  Transcription errors may be present.  If there are any questions, please contact me.      ----------------------------------------------------------------------------------------------------------------------      HPI:  Patient Care Team:  New Feliz MD as PCP - General (Family  Medicine)    Shanae is a 36 y.o. 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 Patient denies any fever, but states that she started to have coughing and SOB the past 3 days. She feels that she has to struggle to breathe last night and into this morning and feels that it is getting worse. She has been taking OTC medications the past few days but doesn't feel that they have helped. No fever/chills/N/V/sore throat/ear pain, +nasal congestion, worsening cough and SOB        Answers for HPI/ROS submitted by the patient on 2022  Affected ear: both  Chronicity: new  Onset: in the past 7 days  Progression since onset: gradually worsening  Frequency: constantly  Fever: no fever  Pain - numeric: 6/10  cough: Yes  Treatments tried: NSAIDs  Improvement on treatment: no relief  Pain severity: moderate  chronic ear infection: No  hearing loss: No  tympanostomy tube: No        Patient Active Problem List   Diagnosis    Endometrioma of ovary    Itching    Hypothyroidism due to Hashimoto's thyroiditis    History of pre-eclampsia    History of cholestasis during pregnancy    S/P  section    Elevated liver enzymes    Cholestasis during pregnancy    Liver dysfunction    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       Current Outpatient Medications:     albuterol (PROVENTIL/VENTOLIN HFA) 90 mcg/actuation inhaler, Inhale 2 puffs into the lungs every 4 (four) hours as needed for Wheezing or Shortness of Breath. Rescue, Disp: 18 g, Rfl: 2    ibuprofen (ADVIL ORAL), Take by mouth as needed., Disp: , Rfl:     levothyroxine (SYNTHROID) 75 MCG tablet, Take 1 tablet (75 mcg total) by mouth before breakfast. Please take as a single dose, on an empty stomach with glass of water, one-half to one hour before breakfast., Disp: 90 tablet, Rfl: 3    promethazine-dextromethorphan (PROMETHAZINE-DM) 6.25-15 mg/5 mL Syrp, Take 5 mLs by mouth 3 (three) times daily as needed (cough)., Disp: 118 mL, Rfl: 1    Past Surgical History:   Procedure Laterality Date    BIOPSY OF LIVER WITH ULTRASOUND GUIDANCE N/A 2021    Procedure: BIOPSY, LIVER, WITH US GUIDANCE;  Surgeon: Baldev Chino MD;  Location: [facility] CATH LAB;  Service: Radiology;  Laterality: N/A;     SECTION N/A 10/12/2020    Procedure:  SECTION;  Surgeon: Harmony Singh MD;  Location: [facility] L&D;  Service: OB/GYN;  Laterality: N/A;     SECTION      DIAGNOSTIC LAPAROSCOPY N/A 10/28/2019    Procedure: LAPAROSCOPY, DIAGNOSTIC;  Surgeon: Luis Armando Vega MD;  Location: [facility] OR;  Service: OB/GYN;  Laterality: N/A;    LAPAROSCOPIC SURGICAL REMOVAL OF CYST OF OVARY Left 10/28/2019    Procedure: EXCISION, CYST, OVARY, LAPAROSCOPIC, LEFT;  Surgeon: Luis Armando Vega MD;  Location: [facility] OR;  Service: OB/GYN;  Laterality: Left;    NEEDLE LOCALIZATION N/A 2021    Procedure: NEEDLE LOCALIZATION;  Surgeon: Baldev Chino MD;  Location: [facility] CATH LAB;  Service: Radiology;  Laterality: N/A;    WISDOM TOOTH EXTRACTION         Family History   Problem Relation Age of Onset    Colon cancer Paternal Uncle     Cataracts Mother     No Known Problems Father     No Known Problems Sister     No Known Problems Brother     No Known Problems  Maternal Aunt     No Known Problems Maternal Uncle     No Known Problems Paternal Aunt     No Known Problems Maternal Grandmother     No Known Problems Maternal Grandfather     No Known Problems Paternal Grandmother     No Known Problems Paternal Grandfather     Breast cancer Neg Hx     Ovarian cancer Neg Hx        Social History     Socioeconomic History    Marital status:    Tobacco Use    Smoking status: Never Smoker    Smokeless tobacco: Never Used   Substance and Sexual Activity    Alcohol use: Not Currently     Comment: social    Drug use: Never    Sexual activity: Yes     Partners: Male     Birth control/protection: None             Allergies   Review of patient's allergies indicates:   Allergen Reactions    Doxycycline      N/V    Shrimp Nausea And Vomiting             Review of Systems  See HPI      Physical Exam  There were no vitals taken for this visit.    GEN: appears very anxious, tearful. +moderately increased work of breathing.

## 2022-07-14 NOTE — ED PROVIDER NOTES
Chief complaint:  COVID-19 Concerns (Believes at home test was positive. Congestion and coughing up mucus.)      HPI:  Shanae Vo is a 36 y.o. female with past medical history significant for hypothyroidism, hypertension and cholestasis during pregnancy who presents to the emergency department today for evaluation of possible COVID 19 infection.  Patient states her symptoms began on a Saturday, 5 days ago.  She reports a cough productive of clear and yellow sputum, shortness of breath and bilateral earache.  She denies any pain in her chest.  She denies fever or chills.  Her son just tested positive for COVID.  He has been ill with similar symptoms.  She took a home COVID test but was unable to tell if it was positive or not.  Patient's  is in the  and is currently deployed but scheduled to return tomorrow.        Review of patient's allergies indicates:   Allergen Reactions    Shrimp Nausea And Vomiting       No current facility-administered medications on file prior to encounter.     Current Outpatient Medications on File Prior to Encounter   Medication Sig Dispense Refill    albuterol (PROVENTIL/VENTOLIN HFA) 90 mcg/actuation inhaler Inhale 2 puffs into the lungs every 4 (four) hours as needed for Wheezing or Shortness of Breath. Rescue 18 g 2    ibuprofen (ADVIL ORAL) Take by mouth as needed.      levothyroxine (SYNTHROID) 75 MCG tablet Take 1 tablet (75 mcg total) by mouth before breakfast. Please take as a single dose, on an empty stomach with glass of water, one-half to one hour before breakfast. 90 tablet 3    promethazine-dextromethorphan (PROMETHAZINE-DM) 6.25-15 mg/5 mL Syrp Take 5 mLs by mouth 3 (three) times daily as needed (cough). 118 mL 1       PMH:  As per HPI and below:  Past Medical History:   Diagnosis Date    Elevated liver enzymes 3/3/2021    Finger infection     right finger- patient will go to urgent care and take care of it    Hypertension     Thyroid disease      hypothyroid     Past Surgical History:   Procedure Laterality Date    BIOPSY OF LIVER WITH ULTRASOUND GUIDANCE N/A 2021    Procedure: BIOPSY, LIVER, WITH US GUIDANCE;  Surgeon: Baldev Chino MD;  Location: [facility] CATH LAB;  Service: Radiology;  Laterality: N/A;     SECTION N/A 10/12/2020    Procedure:  SECTION;  Surgeon: Harmony Singh MD;  Location: [facility] L&D;  Service: OB/GYN;  Laterality: N/A;     SECTION      DIAGNOSTIC LAPAROSCOPY N/A 10/28/2019    Procedure: LAPAROSCOPY, DIAGNOSTIC;  Surgeon: Luis Armando Vega MD;  Location: [facility] OR;  Service: OB/GYN;  Laterality: N/A;    LAPAROSCOPIC SURGICAL REMOVAL OF CYST OF OVARY Left 10/28/2019    Procedure: EXCISION, CYST, OVARY, LAPAROSCOPIC, LEFT;  Surgeon: Luis Armando Vega MD;  Location: [facility] OR;  Service: OB/GYN;  Laterality: Left;    NEEDLE LOCALIZATION N/A 2021    Procedure: NEEDLE LOCALIZATION;  Surgeon: Baldev Chino MD;  Location: [facility] CATH LAB;  Service: Radiology;  Laterality: N/A;    WISDOM TOOTH EXTRACTION         Social History     Socioeconomic History    Marital status:    Tobacco Use    Smoking status: Never Smoker    Smokeless tobacco: Never Used   Substance and Sexual Activity    Alcohol use: Not Currently     Comment: social    Drug use: Never    Sexual activity: Yes     Partners: Male     Birth control/protection: None       Family History   Problem Relation Age of Onset    Colon cancer Paternal Uncle     Cataracts Mother     No Known Problems Father     No Known Problems Sister     No Known Problems Brother     No Known Problems Maternal Aunt     No Known Problems Maternal Uncle     No Known Problems Paternal Aunt     No Known Problems Maternal Grandmother     No Known Problems Maternal Grandfather     No Known Problems Paternal Grandmother     No Known Problems Paternal Grandfather     Breast cancer Neg Hx     Ovarian cancer Neg Hx        Review of Systems  As  per HPI and Below  Constitutional: Negative for chills and fever.   HENT:  Positive for congestion.  Negative for sore throat.  Positive for earache.  Eyes: Negative for visual disturbance.   Respiratory:  Positive for cough.  Positive for sputum.  Negative for hemoptysis.  Positive for shortness of breath.  Cardiovascular: Negative for chest pain.   Gastrointestinal: Negative for abdominal pain, diarrhea, nausea and vomiting.   Genitourinary: Negative for flank pain. Negative for dysuria.  Musculoskeletal: Negative for arthralgias and myalgias.   Skin: Negative for rash and wound.   Neurological: Negative for dizziness. Negative for weakness and numbness.   Psychiatric/Behavioral: Negative for confusion.       Physical Exam:    Vitals:    07/14/22 1526   BP:    Pulse: 94   Resp:    Temp:      Nursing note and vitals reviewed.  Constitutional: Patient appears well-developed and well-nourished. Not diaphoretic. No distress.   HENT:   Head: Normocephalic and atraumatic.  Normal posterior oropharynx.  Normal TMs bilaterally.  No sinus tenderness.  Eyes: Conjunctivae are normal. No scleral icterus.   Neck: Normal range of motion. Neck supple.   Cardiovascular: Normal rate, regular rhythm and normal heart sounds.   Pulmonary/Chest:  Lungs are clear to auscultation bilaterally.  No respiratory distress.  Abdominal: Soft. There is no tenderness.   Musculoskeletal: Normal range of motion. No edema or tenderness.   Neurological: Alert and oriented to person, place, and time. Normal strength. No sensory deficit.   Skin: Skin is warm and dry. No rash noted. No erythema. No pallor.   Psychiatric: Normal mood and affect. Thought content normal.       Labs Reviewed   SARS-COV-2 RDRP GENE - Abnormal; Notable for the following components:       Result Value    POC Rapid COVID Positive (*)     All other components within normal limits    Narrative:     This test utilizes isothermal nucleic acid amplification   technology to detect  the SARS-CoV-2 RdRp nucleic acid segment.   The analytical sensitivity (limit of detection) is 125 genome   equivalents/mL.   A POSITIVE result implies infection with the SARS-CoV-2 virus;   the patient is presumed to be contagious.     A NEGATIVE result means that SARS-CoV-2 nucleic acids are not   present above the limit of detection. A NEGATIVE result should be   treated as presumptive. It does not rule out the possibility of   COVID-19 and should not be the sole basis for treatment decisions.   If COVID-19 is strongly suspected based on clinical and exposure   history, re-testing using an alternate molecular assay should be   considered.   This test is only for use under the Food and Drug   Administration s Emergency Use Authorization (EUA).   Commercial kits are provided by [employer] ([employer]).   Performance characteristics of the EUA have been independently   verified by [facility] Department of   Pathology and Laboratory Medicine.   _________________________________________________________________   The authorized Fact Sheet for Healthcare Providers and the authorized Fact   Sheet for Patients of the ID NOW COVID-19 are available on the FDA   website:     https://www.fda.gov/media/398044/download  https://www.fda.gov/media/309414/download           POCT URINE PREGNANCY       Imaging Results          X-Ray Chest 1 View (Final result)  Result time 07/14/22 16:24:48   Procedure changed from X-Ray Chest PA And Lateral     Final result by Albin Patterson MD (07/14/22 16:24:48)                 Impression:      No radiographic evidence of pneumonia or other source of cough/shortness of breath on this single view, noting that early/mild viral pneumonia may be radiographically occult.      Electronically signed by: Albin Patterson MD  Date:    07/14/2022  Time:    16:24             Narrative:    EXAMINATION:  XR CHEST 1 VIEW    CLINICAL HISTORY:  covid; Contact with and (suspected) exposure to  covid-19    TECHNIQUE:  Single frontal view of the chest was performed.    COMPARISON:  Chest radiograph 10/09/2020    FINDINGS:  No detrimental change.  Trachea is midline and patent.  Resolution of previous bibasilar opacities.  The lungs are symmetrically well expanded and clear, with normal appearance of pulmonary vasculature and no pleural effusion or pneumothorax.    The cardiac silhouette is normal in size. The hilar and mediastinal contours are unremarkable.    Bones are intact.  PA and lateral views can be obtained.                                No results found.    Procedures      MDM:    36 y.o. female presenting for eval of a 5 day hx of productive cough, SOB and otalgia. She is afebrile, non toxic and non distressed in the ED. Son is covid positive.    Pt is COVID positive. Physical exam is grossly unremarkable. Ambulatory O2 sat 99% on room air. CXR unremarkable. Covid risk score = 1.     She is stable for discharge home with OTC supportive care per package instructions. She was enrolled in the COVID surveillance program and discharged home with pulse ox. Strict ED return precautions discussed. Current CDC isolation guidelines discussed.          Medication List      START taking these medications    pulse oximeter device  Commonly known as: pulse oximeter  by Apply Externally route 2 (two) times a day. Use twice daily at 8 AM and 3 PM and record the value in [employer] as directed.        ASK your doctor about these medications    ADVIL ORAL     albuterol 90 mcg/actuation inhaler  Commonly known as: PROVENTIL/VENTOLIN HFA  Inhale 2 puffs into the lungs every 4 (four) hours as needed for Wheezing or Shortness of Breath. Rescue     doxycycline 100 MG tablet  Commonly known as: VIBRA-TABS  Take 1 tablet (100 mg total) by mouth every 12 (twelve) hours.     levothyroxine 75 MCG tablet  Commonly known as: SYNTHROID  Take 1 tablet (75 mcg total) by mouth before breakfast. Please take as a single dose, on an  empty stomach with glass of water, one-half to one hour before breakfast.     promethazine-dextromethorphan 6.25-15 mg/5 mL Syrp  Commonly known as: PROMETHAZINE-DM  Take 5 mLs by mouth 3 (three) times daily as needed (cough).     sulfamethoxazole-trimethoprim 800-160mg 800-160 mg Tab  Commonly known as: BACTRIM DS  Take 1 tablet by mouth 2 (two) times daily.           Where to Get Your Medications      These medications were sent to [facility]  [address] [facility] [address]    Hours: Mon-Fri 7a-7p, Sat-Sun 10a-4p Phone: [phone]   · pulse oximeter device          Diagnoses:  The primary encounter diagnosis was COVID-19 virus infection. A diagnosis of Suspected COVID-19 virus infection was also pertinent to this visit.         Agustina Lopez NP  07/15/22 [id number]

## 2022-07-14 NOTE — Clinical Note
"Shanae Vo (Jackie) was seen and treated in our emergency department on 7/14/2022.     COVID-19 is present in our communities across the state. There is limited testing for COVID at this time, so not all patients can be tested. In this situation, your employee meets the following criteria:    Shanae Vo has met the criteria for COVID-19 testing and has a POSITIVE result. She can return to work once they are asymptomatic for 24 hours without the use of fever reducing medications AND at least five days from the first positive result. A mask is recommended for 5 days post quarantine.     If you have any questions or concerns, or if I can be of further assistance, please do not hesitate to contact me.    Sincerely,             Flash Rodriguez III, MD"

## 2022-07-14 NOTE — ED NOTES
Patient identifiers verified and correct for [employer]  C/C: COVID symptoms SEE NN  APPEARANCE: awake and alert in NAD.  SKIN: warm, dry and intact. No breakdown or bruising.  MUSCULOSKELETAL: Patient moving all extremities spontaneously, no obvious swelling or deformities noted. Ambulates independently.  RESPIRATORY:Positive  shortness of breath.Respirations unlabored. Reports cough   CARDIAC: Positive CP, 2+ distal pulses; no peripheral edema  ABDOMEN: S/ND/NT, Denies nausea  : voids spontaneously, denies difficulty  Neurologic: AAO x 4; follows commands equal strength in all extremities; denies numbness/tingling. Denies dizziness denies weqakqness

## 2022-07-14 NOTE — ED NOTES
Patient has been in PEDS with child, reports SOB,difficulty breathing. Sent to ED for infusion. In ED with small child, Positive home COVID test Monday, symptom onset  Saturday

## 2022-07-15 ENCOUNTER — PATIENT MESSAGE (OUTPATIENT)
Dept: ADMINISTRATIVE | Facility: OTHER | Age: 37
End: 2022-07-15
Payer: OTHER GOVERNMENT

## 2022-07-15 ENCOUNTER — NURSE TRIAGE (OUTPATIENT)
Dept: ADMINISTRATIVE | Facility: CLINIC | Age: 37
End: 2022-07-15
Payer: OTHER GOVERNMENT

## 2022-07-15 NOTE — TELEPHONE ENCOUNTER
Called patient to review COVID-19 Surveillance Program enrollment process.    Smartphone: yes    [facility]: yes    Program consent: yes    Pulse oximeter status: yes    Verified emergency contacts: yes    Program Overview: Reviewed , no response process, and importance of correct emergency contacts in event that well-being check is warranted. Patient will call [phone] 24/7 to speak with an OnCall nurse, if needed. Pt aware that if Sp02 less than or equal to 93% or if they have any worsening symptoms, they need to go to the emergency department. If they are having a medical emergency, they will call 911. Otherwise, patient will continue to submit data as scheduled. Reviewed importance of wearing mask if self or family members leave the house.     Pt was able to get VS and symptoms submitted and all WNL so no triage needed. Gave number to OOC for further concerns. Patient had no further questions. Will continue to monitor.    Reason for Disposition   Information only question and nurse able to answer    Protocols used: INFORMATION ONLY CALL - NO TRIAGE-A-[place]

## 2022-07-15 NOTE — TELEPHONE ENCOUNTER
1st enrollment call - unable to make phone contact - VM left and my chart message sent.     Reason for Disposition   Message left on unidentified voice mail. Phone number verified.    Protocols used: NO CONTACT OR DUPLICATE CONTACT CALL-A-[place]

## 2022-07-16 ENCOUNTER — PATIENT MESSAGE (OUTPATIENT)
Dept: ADMINISTRATIVE | Facility: CLINIC | Age: 37
End: 2022-07-16
Payer: OTHER GOVERNMENT

## 2022-07-16 ENCOUNTER — PATIENT MESSAGE (OUTPATIENT)
Dept: ADMINISTRATIVE | Facility: OTHER | Age: 37
End: 2022-07-16
Payer: OTHER GOVERNMENT

## 2022-07-17 ENCOUNTER — NURSE TRIAGE (OUTPATIENT)
Dept: ADMINISTRATIVE | Facility: CLINIC | Age: 37
End: 2022-07-17
Payer: OTHER GOVERNMENT

## 2022-07-20 ENCOUNTER — PATIENT MESSAGE (OUTPATIENT)
Dept: FAMILY MEDICINE | Facility: CLINIC | Age: 37
End: 2022-07-20
Payer: OTHER GOVERNMENT

## 2022-07-21 ENCOUNTER — OFFICE VISIT (OUTPATIENT)
Dept: FAMILY MEDICINE | Facility: CLINIC | Age: 37
End: 2022-07-21
Payer: OTHER GOVERNMENT

## 2022-07-21 VITALS
WEIGHT: 175.5 LBS | OXYGEN SATURATION: 97 % | HEIGHT: 61 IN | TEMPERATURE: 98 F | HEART RATE: 73 BPM | SYSTOLIC BLOOD PRESSURE: 120 MMHG | DIASTOLIC BLOOD PRESSURE: 80 MMHG | BODY MASS INDEX: 33.14 KG/M2

## 2022-07-21 DIAGNOSIS — E03.8 HYPOTHYROIDISM DUE TO HASHIMOTO'S THYROIDITIS: ICD-10-CM

## 2022-07-21 DIAGNOSIS — E06.3 HYPOTHYROIDISM DUE TO HASHIMOTO'S THYROIDITIS: ICD-10-CM

## 2022-07-21 DIAGNOSIS — J40 BRONCHITIS: Primary | ICD-10-CM

## 2022-07-21 PROCEDURE — 99999 PR PBB SHADOW E&M-EST. PATIENT-LVL IV: CPT | Mod: PBBFAC,,, | Performed by: FAMILY MEDICINE

## 2022-07-21 PROCEDURE — 99999 PR PBB SHADOW E&M-EST. PATIENT-LVL IV: ICD-10-PCS | Mod: PBBFAC,,, | Performed by: FAMILY MEDICINE

## 2022-07-21 PROCEDURE — 99214 OFFICE O/P EST MOD 30 MIN: CPT | Mod: 25,PBBFAC,PO | Performed by: FAMILY MEDICINE

## 2022-07-21 PROCEDURE — 94640 AIRWAY INHALATION TREATMENT: CPT | Mod: PBBFAC,PO

## 2022-07-21 PROCEDURE — 96372 THER/PROPH/DIAG INJ SC/IM: CPT | Mod: PBBFAC,59,PO

## 2022-07-21 PROCEDURE — 99214 OFFICE O/P EST MOD 30 MIN: CPT | Mod: S$PBB,,, | Performed by: FAMILY MEDICINE

## 2022-07-21 PROCEDURE — 99214 PR OFFICE/OUTPT VISIT, EST, LEVL IV, 30-39 MIN: ICD-10-PCS | Mod: S$PBB,,, | Performed by: FAMILY MEDICINE

## 2022-07-21 RX ORDER — CODEINE PHOSPHATE AND GUAIFENESIN 10; 100 MG/5ML; MG/5ML
5 SOLUTION ORAL EVERY 8 HOURS PRN
Qty: 200 ML | Refills: 0 | Status: SHIPPED | OUTPATIENT
Start: 2022-07-21 | End: 2022-07-31

## 2022-07-21 RX ORDER — DEXAMETHASONE SODIUM PHOSPHATE 4 MG/ML
8 INJECTION, SOLUTION INTRA-ARTICULAR; INTRALESIONAL; INTRAMUSCULAR; INTRAVENOUS; SOFT TISSUE
Status: COMPLETED | OUTPATIENT
Start: 2022-07-21 | End: 2022-07-21

## 2022-07-21 RX ORDER — IPRATROPIUM BROMIDE AND ALBUTEROL SULFATE 2.5; .5 MG/3ML; MG/3ML
3 SOLUTION RESPIRATORY (INHALATION)
Status: COMPLETED | OUTPATIENT
Start: 2022-07-21 | End: 2022-07-21

## 2022-07-21 RX ORDER — PREDNISONE 20 MG/1
40 TABLET ORAL DAILY
Qty: 10 TABLET | Refills: 0 | Status: SHIPPED | OUTPATIENT
Start: 2022-07-21 | End: 2022-07-26

## 2022-07-21 RX ADMIN — DEXAMETHASONE SODIUM PHOSPHATE 8 MG: 4 INJECTION, SOLUTION INTRAMUSCULAR; INTRAVENOUS at 10:07

## 2022-07-21 RX ADMIN — IPRATROPIUM BROMIDE AND ALBUTEROL SULFATE 3 ML: .5; 3 SOLUTION RESPIRATORY (INHALATION) at 10:07

## 2022-07-21 NOTE — PROGRESS NOTES
"Routine Office Visit    Shanae Vo  1985  [id number]      Subjective     Shanae is a 36 y.o. female who presents today for:    1. covid - Patient had covid approximately 12 days ago. Patient now has wheezing and coughing. Coughing is non productive, dry hacking cough. Patient feels chest tightness.      Objective     Review of Systems   Constitutional: Negative for chills and fever.   HENT: Positive for congestion.    Eyes: Negative for blurred vision.   Respiratory: Positive for cough and wheezing. Negative for sputum production.    Cardiovascular: Negative for chest pain.   Gastrointestinal: Negative for abdominal pain, constipation, diarrhea, heartburn, nausea and vomiting.   Genitourinary: Negative for dysuria.   Musculoskeletal: Negative for myalgias.   Skin: Negative for itching and rash.   Neurological: Negative for dizziness and headaches.   Psychiatric/Behavioral: Negative for depression.       /80 (BP Location: Left arm, Patient Position: Sitting, BP Method: Large (Manual))   Pulse 73   Temp 98.3 °F (36.8 °C) (Oral)   Ht 5' 1" (1.549 m)   Wt 79.6 kg (175 lb 7.8 oz)   LMP 07/11/2022   SpO2 97%   BMI 33.16 kg/m²   Physical Exam  Constitutional:       Appearance: She is well-developed.   HENT:      Head: Normocephalic and atraumatic.   Eyes:      Conjunctiva/sclera: Conjunctivae normal.      Pupils: Pupils are equal, round, and reactive to light.   Cardiovascular:      Rate and Rhythm: Normal rate and regular rhythm.      Heart sounds: Normal heart sounds. No murmur heard.    No friction rub. No gallop.   Pulmonary:      Effort: No respiratory distress.      Breath sounds: Wheezing present.   Abdominal:      General: Bowel sounds are normal. There is no distension.      Palpations: Abdomen is soft.      Tenderness: There is no abdominal tenderness.   Musculoskeletal:         General: Normal range of motion.      Cervical back: Normal range of motion and neck supple. "   Lymphadenopathy:      Cervical: No cervical adenopathy.   Skin:     General: Skin is warm.   Neurological:      Mental Status: She is alert and oriented to person, place, and time.           Assessment     Problem List Items Addressed This Visit        Endocrine    Hypothyroidism due to Hashimoto's thyroiditis  The current medical regimen is effective;  continue present plan and medications.        Other Visit Diagnoses     Bronchitis    -  Primary    Relevant Medications    albuterol-ipratropium 2.5 mg-0.5 mg/3 mL nebulizer solution 3 mL (Completed)    dexamethasone injection 8 mg (Completed)    guaiFENesin-codeine 100-10 mg/5 ml (GUAIFENESIN AC)  mg/5 mL syrup    predniSONE (DELTASONE) 20 MG tablet  Common side effects of this medication were discussed with the patient. Questions regarding medications were discussed during this visit.   Patient auscultated after in office treatment. Wheezing improving. Cough improved  Continue albuterol - Patient has prescription   - Antibiotics are not needed at this time  - Symptomatic treatment with over the counter Tylenol / Ibuprofen  - Use salt water gargle for sore throat  - Drink plenty of fluids            Follow up if symptoms worsen or fail to improve.

## 2022-07-21 NOTE — PROGRESS NOTES
Patient received decadron 8 mg injection to left upper outer gluteus ,pt tolerated injection well.

## 2022-07-27 ENCOUNTER — PATIENT MESSAGE (OUTPATIENT)
Dept: HEPATOLOGY | Facility: CLINIC | Age: 37
End: 2022-07-27
Payer: OTHER GOVERNMENT

## 2022-07-27 DIAGNOSIS — R79.89 ELEVATED LIVER FUNCTION TESTS: Primary | ICD-10-CM

## 2022-07-29 ENCOUNTER — LAB VISIT (OUTPATIENT)
Dept: LAB | Facility: HOSPITAL | Age: 37
End: 2022-07-29
Attending: INTERNAL MEDICINE
Payer: OTHER GOVERNMENT

## 2022-07-29 DIAGNOSIS — Z00.00 ANNUAL PHYSICAL EXAM: ICD-10-CM

## 2022-07-29 DIAGNOSIS — R79.89 ELEVATED LIVER FUNCTION TESTS: ICD-10-CM

## 2022-07-29 DIAGNOSIS — R74.8 ELEVATED LIVER ENZYMES: ICD-10-CM

## 2022-07-29 LAB
25(OH)D3+25(OH)D2 SERPL-MCNC: 30 NG/ML (ref 30–96)
ALBUMIN SERPL BCP-MCNC: 3.7 G/DL (ref 3.5–5.2)
ALP SERPL-CCNC: 67 U/L (ref 55–135)
ALT SERPL W/O P-5'-P-CCNC: 19 U/L (ref 10–44)
ANION GAP SERPL CALC-SCNC: 7 MMOL/L (ref 8–16)
ANION GAP SERPL CALC-SCNC: 7 MMOL/L (ref 8–16)
AST SERPL-CCNC: 19 U/L (ref 10–40)
BASOPHILS # BLD AUTO: 0.03 K/UL (ref 0–0.2)
BASOPHILS NFR BLD: 0.4 % (ref 0–1.9)
BILIRUB DIRECT SERPL-MCNC: 0.3 MG/DL (ref 0.1–0.3)
BILIRUB DIRECT SERPL-MCNC: 0.3 MG/DL (ref 0.1–0.3)
BILIRUB SERPL-MCNC: 0.7 MG/DL (ref 0.1–1)
BUN SERPL-MCNC: 10 MG/DL (ref 6–20)
BUN SERPL-MCNC: 10 MG/DL (ref 6–20)
CALCIUM SERPL-MCNC: 9.7 MG/DL (ref 8.7–10.5)
CALCIUM SERPL-MCNC: 9.7 MG/DL (ref 8.7–10.5)
CHLORIDE SERPL-SCNC: 103 MMOL/L (ref 95–110)
CHLORIDE SERPL-SCNC: 103 MMOL/L (ref 95–110)
CHOLEST SERPL-MCNC: 218 MG/DL (ref 120–199)
CHOLEST/HDLC SERPL: 3.7 {RATIO} (ref 2–5)
CO2 SERPL-SCNC: 28 MMOL/L (ref 23–29)
CO2 SERPL-SCNC: 28 MMOL/L (ref 23–29)
CREAT SERPL-MCNC: 0.7 MG/DL (ref 0.5–1.4)
CREAT SERPL-MCNC: 0.7 MG/DL (ref 0.5–1.4)
DIFFERENTIAL METHOD: ABNORMAL
EOSINOPHIL # BLD AUTO: 0.1 K/UL (ref 0–0.5)
EOSINOPHIL NFR BLD: 0.9 % (ref 0–8)
ERYTHROCYTE [DISTWIDTH] IN BLOOD BY AUTOMATED COUNT: 12 % (ref 11.5–14.5)
ERYTHROCYTE [DISTWIDTH] IN BLOOD BY AUTOMATED COUNT: 12 % (ref 11.5–14.5)
EST. GFR  (AFRICAN AMERICAN): >60 ML/MIN/1.73 M^2
EST. GFR  (AFRICAN AMERICAN): >60 ML/MIN/1.73 M^2
EST. GFR  (NON AFRICAN AMERICAN): >60 ML/MIN/1.73 M^2
EST. GFR  (NON AFRICAN AMERICAN): >60 ML/MIN/1.73 M^2
ESTIMATED AVG GLUCOSE: 103 MG/DL (ref 68–131)
GGT SERPL-CCNC: 109 U/L (ref 8–55)
GGT SERPL-CCNC: 109 U/L (ref 8–55)
GLUCOSE SERPL-MCNC: 97 MG/DL (ref 70–110)
GLUCOSE SERPL-MCNC: 97 MG/DL (ref 70–110)
HBA1C MFR BLD: 5.2 % (ref 4–5.6)
HCT VFR BLD AUTO: 42.9 % (ref 37–48.5)
HCT VFR BLD AUTO: 42.9 % (ref 37–48.5)
HDLC SERPL-MCNC: 59 MG/DL (ref 40–75)
HDLC SERPL: 27.1 % (ref 20–50)
HGB BLD-MCNC: 14.6 G/DL (ref 12–16)
HGB BLD-MCNC: 14.6 G/DL (ref 12–16)
IMM GRANULOCYTES # BLD AUTO: 0.01 K/UL (ref 0–0.04)
IMM GRANULOCYTES NFR BLD AUTO: 0.1 % (ref 0–0.5)
INR PPP: 1 (ref 0.8–1.2)
LDLC SERPL CALC-MCNC: 136 MG/DL (ref 63–159)
LYMPHOCYTES # BLD AUTO: 2.1 K/UL (ref 1–4.8)
LYMPHOCYTES NFR BLD: 25.9 % (ref 18–48)
MCH RBC QN AUTO: 29.5 PG (ref 27–31)
MCH RBC QN AUTO: 29.5 PG (ref 27–31)
MCHC RBC AUTO-ENTMCNC: 34 G/DL (ref 32–36)
MCHC RBC AUTO-ENTMCNC: 34 G/DL (ref 32–36)
MCV RBC AUTO: 87 FL (ref 82–98)
MCV RBC AUTO: 87 FL (ref 82–98)
MONOCYTES # BLD AUTO: 0.7 K/UL (ref 0.3–1)
MONOCYTES NFR BLD: 8.9 % (ref 4–15)
NEUTROPHILS # BLD AUTO: 5.1 K/UL (ref 1.8–7.7)
NEUTROPHILS NFR BLD: 63.8 % (ref 38–73)
NONHDLC SERPL-MCNC: 159 MG/DL
NRBC BLD-RTO: 0 /100 WBC
PLATELET # BLD AUTO: 328 K/UL (ref 150–450)
PLATELET # BLD AUTO: 328 K/UL (ref 150–450)
PMV BLD AUTO: 9.1 FL (ref 9.2–12.9)
PMV BLD AUTO: 9.1 FL (ref 9.2–12.9)
POTASSIUM SERPL-SCNC: 4.2 MMOL/L (ref 3.5–5.1)
POTASSIUM SERPL-SCNC: 4.2 MMOL/L (ref 3.5–5.1)
PROT SERPL-MCNC: 7.2 G/DL (ref 6–8.4)
PROTHROMBIN TIME: 10.3 SEC (ref 9–12.5)
RBC # BLD AUTO: 4.95 M/UL (ref 4–5.4)
RBC # BLD AUTO: 4.95 M/UL (ref 4–5.4)
SODIUM SERPL-SCNC: 138 MMOL/L (ref 136–145)
SODIUM SERPL-SCNC: 138 MMOL/L (ref 136–145)
TRIGL SERPL-MCNC: 115 MG/DL (ref 30–150)
WBC # BLD AUTO: 8.02 K/UL (ref 3.9–12.7)
WBC # BLD AUTO: 8.02 K/UL (ref 3.9–12.7)

## 2022-07-29 PROCEDURE — 80076 HEPATIC FUNCTION PANEL: CPT | Performed by: INTERNAL MEDICINE

## 2022-07-29 PROCEDURE — 82306 VITAMIN D 25 HYDROXY: CPT | Performed by: FAMILY MEDICINE

## 2022-07-29 PROCEDURE — 80061 LIPID PANEL: CPT | Performed by: FAMILY MEDICINE

## 2022-07-29 PROCEDURE — 36415 COLL VENOUS BLD VENIPUNCTURE: CPT | Performed by: INTERNAL MEDICINE

## 2022-07-29 PROCEDURE — 82977 ASSAY OF GGT: CPT | Performed by: INTERNAL MEDICINE

## 2022-07-29 PROCEDURE — 85025 COMPLETE CBC W/AUTO DIFF WBC: CPT | Performed by: INTERNAL MEDICINE

## 2022-07-29 PROCEDURE — 80053 COMPREHEN METABOLIC PANEL: CPT | Performed by: FAMILY MEDICINE

## 2022-07-29 PROCEDURE — 82239 BILE ACIDS TOTAL: CPT | Performed by: INTERNAL MEDICINE

## 2022-07-29 PROCEDURE — 85610 PROTHROMBIN TIME: CPT | Performed by: INTERNAL MEDICINE

## 2022-07-29 PROCEDURE — 83036 HEMOGLOBIN GLYCOSYLATED A1C: CPT | Performed by: FAMILY MEDICINE

## 2022-07-30 LAB
BILE AC SERPL-SCNC: 5 MCMOL/L
BILE AC SERPL-SCNC: 5 MCMOL/L

## 2022-08-02 ENCOUNTER — OFFICE VISIT (OUTPATIENT)
Dept: FAMILY MEDICINE | Facility: CLINIC | Age: 37
End: 2022-08-02
Payer: OTHER GOVERNMENT

## 2022-08-02 VITALS
DIASTOLIC BLOOD PRESSURE: 68 MMHG | OXYGEN SATURATION: 98 % | TEMPERATURE: 98 F | WEIGHT: 174.38 LBS | HEIGHT: 61 IN | HEART RATE: 87 BPM | SYSTOLIC BLOOD PRESSURE: 110 MMHG | BODY MASS INDEX: 32.92 KG/M2

## 2022-08-02 DIAGNOSIS — E03.8 HYPOTHYROIDISM DUE TO HASHIMOTO'S THYROIDITIS: ICD-10-CM

## 2022-08-02 DIAGNOSIS — E66.9 OBESITY (BMI 30-39.9): ICD-10-CM

## 2022-08-02 DIAGNOSIS — E06.3 HYPOTHYROIDISM DUE TO HASHIMOTO'S THYROIDITIS: ICD-10-CM

## 2022-08-02 DIAGNOSIS — E78.5 MILD HYPERLIPIDEMIA: ICD-10-CM

## 2022-08-02 DIAGNOSIS — Z00.00 ANNUAL PHYSICAL EXAM: Primary | ICD-10-CM

## 2022-08-02 PROCEDURE — 99395 PREV VISIT EST AGE 18-39: CPT | Mod: S$PBB,,, | Performed by: FAMILY MEDICINE

## 2022-08-02 PROCEDURE — 99999 PR PBB SHADOW E&M-EST. PATIENT-LVL III: CPT | Mod: PBBFAC,,, | Performed by: FAMILY MEDICINE

## 2022-08-02 PROCEDURE — 99213 OFFICE O/P EST LOW 20 MIN: CPT | Mod: PBBFAC,PO | Performed by: FAMILY MEDICINE

## 2022-08-02 PROCEDURE — 99999 PR PBB SHADOW E&M-EST. PATIENT-LVL III: ICD-10-PCS | Mod: PBBFAC,,, | Performed by: FAMILY MEDICINE

## 2022-08-02 PROCEDURE — 99395 PR PREVENTIVE VISIT,EST,18-39: ICD-10-PCS | Mod: S$PBB,,, | Performed by: FAMILY MEDICINE

## 2022-08-02 NOTE — PROGRESS NOTES
"  Physical Exam  /68   Pulse 87   Temp 98.4 °F (36.9 °C) (Oral)   Ht 5' 1" (1.549 m)   Wt 79.1 kg (174 lb 6.1 oz)   LMP 2022   SpO2 98%   BMI 32.95 kg/m²      Office Visit    Patient Name: Shanae Vo    : 1985  MRN: [id number]      Assessment/Plan:  Shanae Vo is a 36 y.o. female who presents today for :    -Annual physical exam  -Obesity (BMI 30-39.9)  -Mild hyperlipidemia  -previous labs reviewed and discussed with patient  -anticipatory guidance provided with age appropriate preventative services discussed, healthy diet and regular physical exercise also discussed with patient - again stressed portion control.    Hypothyroidism due to Hashimoto's thyroiditis  -stable, continue on current regimen   -f/u Endocrine as needed    Follow up PRN    This note was created by combination of typed  and MModal dictation.  Transcription errors may be present.  If there are any questions, please contact me.        ----------------------------------------------------------------------------------------------------------------------      HPI:  Patient Care Team:  New Feliz MD as PCP - General (Family Medicine)    Shanae is a 36 y.o. female with      Patient Active Problem List   Diagnosis    Endometrioma of ovary    Itching    -Hypothyroidism due to Hashimoto's thyroiditis - controlled on Levothyroxine - follows Dr. 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 she was treated for COVID over 2 weeks ago, which she " has since fully recovered from. Health maintenance-wise, she is up to date on Pap screening. Recent labs were reviewed and are wnl with the except of mildly elevated total cholesterol - which has continued to improve with low fat and overall healthy diet. She was being followed by [facility] for cholestasis during pregnancy, for which her most recent hepatic and bile acid studies came back normal. She denies any cardiovascular or neurologic complaints today            Additional ROS    CONST: no fever, no activity change, weight stable.   EYES: no vision change.   ENT: no sore throat. No dysphagia.   CV: no CP with exertion  RESP: no SOB  GI: no N/V/diarrhea/constipation  : no urinary concerns  MSK: no new myalgias or arthralgias.   SKIN: no new rashes  NEURO: no focal deficits.   PSYCH: no new issues.   ENDOCRINE: no polyuria.           Current Medications  Medications reviewed and updated.       Current Outpatient Medications:     levothyroxine (SYNTHROID) 75 MCG tablet, Take 1 tablet (75 mcg total) by mouth before breakfast. Please take as a single dose, on an empty stomach with glass of water, one-half to one hour before breakfast., Disp: 90 tablet, Rfl: 3    albuterol (PROVENTIL/VENTOLIN HFA) 90 mcg/actuation inhaler, Inhale 2 puffs into the lungs every 4 (four) hours as needed for Wheezing or Shortness of Breath. Rescue (Patient not taking: Reported on 2022), Disp: 18 g, Rfl: 2    ibuprofen (ADVIL ORAL), Take by mouth as needed., Disp: , Rfl:     Past Surgical History:   Procedure Laterality Date    BIOPSY OF LIVER WITH ULTRASOUND GUIDANCE N/A 2021    Procedure: BIOPSY, LIVER, WITH US GUIDANCE;  Surgeon: Baldev Chino MD;  Location: [facility] CATH LAB;  Service: Radiology;  Laterality: N/A;     SECTION N/A 10/12/2020    Procedure:  SECTION;  Surgeon: Harmony Singh MD;  Location: [facility] L&D;  Service: OB/GYN;  Laterality: N/A;     SECTION      DIAGNOSTIC LAPAROSCOPY N/A  "10/28/2019    Procedure: LAPAROSCOPY, DIAGNOSTIC;  Surgeon: Luis Armando Vega MD;  Location: [facility] OR;  Service: OB/GYN;  Laterality: N/A;    LAPAROSCOPIC SURGICAL REMOVAL OF CYST OF OVARY Left 10/28/2019    Procedure: EXCISION, CYST, OVARY, LAPAROSCOPIC, LEFT;  Surgeon: Luis Armando Vega MD;  Location: [facility] OR;  Service: OB/GYN;  Laterality: Left;    NEEDLE LOCALIZATION N/A 6/18/2021    Procedure: NEEDLE LOCALIZATION;  Surgeon: Baldev Chino MD;  Location: [facility] [place] CATH LAB;  Service: Radiology;  Laterality: N/A;    WISDOM TOOTH EXTRACTION         Family History   Problem Relation Age of Onset    Colon cancer Paternal Uncle     Cataracts Mother     No Known Problems Father     No Known Problems Sister     No Known Problems Brother     No Known Problems Maternal Aunt     No Known Problems Maternal Uncle     No Known Problems Paternal Aunt     No Known Problems Maternal Grandmother     No Known Problems Maternal Grandfather     No Known Problems Paternal Grandmother     No Known Problems Paternal Grandfather     Breast cancer Neg Hx     Ovarian cancer Neg Hx        Social History     Socioeconomic History    Marital status:    Tobacco Use    Smoking status: Never Smoker    Smokeless tobacco: Never Used   Substance and Sexual Activity    Alcohol use: Not Currently     Comment: social    Drug use: Never    Sexual activity: Yes     Partners: Male     Birth control/protection: None           Allergies   Review of patient's allergies indicates:   Allergen Reactions    Shrimp Nausea And Vomiting             Review of Systems  See HPI      [unfilled]  /68   Pulse 87   Temp 98.4 °F (36.9 °C) (Oral)   Ht 5' 1" (1.549 m)   Wt 79.1 kg (174 lb 6.1 oz)   LMP 07/11/2022   SpO2 98%   BMI 32.95 kg/m²     GEN: NAD, well developed, pleasant, well nourished  HEENT: NCAT, PERRLA, EOMI, sclera clear, anicteric, bilateral ear exam wnl, O/P clear, MMM with no lesions  NECK: normal, " supple with midline trachea, no LAD, no thyromegaly  LUNGS: CTAB, no w/r/r, no increased work of breathing   HEART: RRR, normal S1 and S2, no m/r/g, no edema  ABD: s/nt/nd, NABS  SKIN: normal turgor, no rashes  PSYCH: AOx3, appropriate mood and affect  MSK: warm/well perfused, normal ROM in all extremities, no c/c/e.  NEURO: normal without focal findings, CN II-XII are grossly intact.  Sensation/strength grossly normal, gait and station normal.         Labs  Lab Results   Component Value Date    HGBA1C 5.2 07/29/2022     Lab Results   Component Value Date     07/29/2022     07/29/2022    K 4.2 07/29/2022    K 4.2 07/29/2022     07/29/2022     07/29/2022    CO2 28 07/29/2022    CO2 28 07/29/2022    BUN 10 07/29/2022    BUN 10 07/29/2022    CREATININE 0.7 07/29/2022    CREATININE 0.7 07/29/2022    CALCIUM 9.7 07/29/2022    CALCIUM 9.7 07/29/2022    ANIONGAP 7 (L) 07/29/2022    ANIONGAP 7 (L) 07/29/2022    ESTGFRAFRICA >60 07/29/2022    ESTGFRAFRICA >60 07/29/2022    EGFRNONAA >60 07/29/2022    EGFRNONAA >60 07/29/2022     Lab Results   Component Value Date    CHOL 218 (H) 07/29/2022    CHOL 225 (H) 06/03/2021    CHOL 257 (H) 02/26/2021     Lab Results   Component Value Date    HDL 59 07/29/2022    HDL 54 06/03/2021    HDL 60 02/26/2021     Lab Results   Component Value Date    LDLCALC 136.0 07/29/2022    LDLCALC 153.4 06/03/2021    LDLCALC 178.4 (H) 02/26/2021     Lab Results   Component Value Date    TRIG 115 07/29/2022    TRIG 88 06/03/2021    TRIG 93 02/26/2021     Lab Results   Component Value Date    CHOLHDL 27.1 07/29/2022    CHOLHDL 24.0 06/03/2021    CHOLHDL 23.3 02/26/2021     Last set of blood work has been reviewed as noted above.

## 2022-09-02 ENCOUNTER — PATIENT MESSAGE (OUTPATIENT)
Dept: FAMILY MEDICINE | Facility: CLINIC | Age: 37
End: 2022-09-02
Payer: OTHER GOVERNMENT

## 2022-09-02 ENCOUNTER — OFFICE VISIT (OUTPATIENT)
Dept: FAMILY MEDICINE | Facility: CLINIC | Age: 37
End: 2022-09-02
Payer: OTHER GOVERNMENT

## 2022-09-02 DIAGNOSIS — L03.317 CELLULITIS OF BUTTOCK: Primary | ICD-10-CM

## 2022-09-02 DIAGNOSIS — Z87.2 HISTORY OF PILONIDAL CYST: ICD-10-CM

## 2022-09-02 PROCEDURE — 99214 PR OFFICE/OUTPT VISIT, EST, LEVL IV, 30-39 MIN: ICD-10-PCS | Mod: 95,,, | Performed by: FAMILY MEDICINE

## 2022-09-02 PROCEDURE — 99214 OFFICE O/P EST MOD 30 MIN: CPT | Mod: 95,,, | Performed by: FAMILY MEDICINE

## 2022-09-02 RX ORDER — MUPIROCIN 20 MG/G
OINTMENT TOPICAL 3 TIMES DAILY
Qty: 30 G | Refills: 3 | Status: SHIPPED | OUTPATIENT
Start: 2022-09-02 | End: 2022-11-23

## 2022-09-02 NOTE — PROGRESS NOTES
The patient location is: home  The chief complaint leading to consultation is: Recurrent Skin Infections    Visit type: Virtual visit with synchronous audio and video  Total time spent with patient: 16 minutes  Each patient to whom he or she provides medical services by telemedicine is:  (1) informed of the relationship between the physician and patient and the respective role of any other health care provider with respect to management of the patient; and (2) notified that he or she may decline to receive medical services by telemedicine and may withdraw from such care at any time.         Office Visit    Patient Name: Shanae Vo    : 1985  MRN: [id number]      Assessment/Plan:  Shanae Vo is a 37 y.o. female who presents today for :    Cellulitis of buttock  -     mupirocin (BACTROBAN) 2 % ointment; Apply topically 3 (three) times daily.  Dispense: 30 g; Refill: 3  -Advised keeping area dry and clean with good skin hygiene. She declined oral Abx today, as she'd had prior intolerance, but feels that her old topical prescription is adequate at this time. F/u as needed    History of pilonidal cyst  -     Ambulatory referral/consult to General Surgery; Future; Expected date: 2022    This note was created by combination of typed  and MModal dictation.  Transcription errors may be present.  If there are any questions, please contact me.      ----------------------------------------------------------------------------------------------------------------------      HPI:  Patient Care Team:  [place] Feliz MD as PCP - General (Family Medicine)    Shanae is a 37 y.o. female with      Patient Active Problem List   Diagnosis    Endometrioma of ovary    Itching    -Hypothyroidism due to Hashimoto's thyroiditis - controlled on Levothyroxine - follows Dr. 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 She's had prior hx of pilonidal cyst removal and thinks she is having a flare up again. She has been applying her prior Bactroban topical antibiotic cream with some relief, but she would like to get a referral to get the cyst removed.     Additional ROS    No F/C/malaise  No dysphagia/sore throat  No CP/ZELAYA/palpitations/swelling  No cough/wheezing/SOB  No nausea/vomiting/abd pain  No rashes                Patient Active Problem List   Diagnosis    Endometrioma of ovary    Itching    -Hypothyroidism due to Hashimoto's thyroiditis - controlled on Levothyroxine - follows Dr. 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     Current Outpatient Medications on File Prior to Visit   Medication Sig Dispense Refill    albuterol (PROVENTIL/VENTOLIN HFA) 90 mcg/actuation inhaler Inhale 2 puffs into the lungs every 4 (four) hours as needed for Wheezing or Shortness of Breath. Rescue (Patient not taking: Reported on 2022) 18 g 2    ibuprofen (ADVIL ORAL) Take by mouth as needed.      levothyroxine (SYNTHROID) 75 MCG tablet Take 1 tablet (75 mcg total) by mouth before breakfast. Please take as a single dose, on an empty stomach with glass of water,  one-half to one hour before breakfast. 90 tablet 3     No current facility-administered medications on file prior to visit.           Past Surgical History:   Procedure Laterality Date    BIOPSY OF LIVER WITH ULTRASOUND GUIDANCE N/A 2021    Procedure: BIOPSY, LIVER, WITH US GUIDANCE;  Surgeon: Baldev Chino MD;  Location: [facility] CATH LAB;  Service: Radiology;  Laterality: N/A;     SECTION N/A 10/12/2020    Procedure:  SECTION;  Surgeon: Harmony Singh MD;  Location: [facility] L&D;  Service: OB/GYN;  Laterality: N/A;     SECTION      DIAGNOSTIC LAPAROSCOPY N/A 10/28/2019    Procedure: LAPAROSCOPY, DIAGNOSTIC;  Surgeon: Luis Armando Vega MD;  Location: [facility] OR;  Service: OB/GYN;  Laterality: N/A;    LAPAROSCOPIC SURGICAL REMOVAL OF CYST OF OVARY Left 10/28/2019    Procedure: EXCISION, CYST, OVARY, LAPAROSCOPIC, LEFT;  Surgeon: Luis Armando Vega MD;  Location: [facility] OR;  Service: OB/GYN;  Laterality: Left;    NEEDLE LOCALIZATION N/A 2021    Procedure: NEEDLE LOCALIZATION;  Surgeon: Baldev Chino MD;  Location: [facility] CATH LAB;  Service: Radiology;  Laterality: N/A;    WISDOM TOOTH EXTRACTION         Family History   Problem Relation Age of Onset    Colon cancer Paternal Uncle     Cataracts Mother     No Known Problems Father     No Known Problems Sister     No Known Problems Brother     No Known Problems Maternal Aunt     No Known Problems Maternal Uncle     No Known Problems Paternal Aunt     No Known Problems Maternal Grandmother     No Known Problems Maternal Grandfather     No Known Problems Paternal Grandmother     No Known Problems Paternal Grandfather     Breast cancer Neg Hx     Ovarian cancer Neg Hx        Social History     Socioeconomic History    Marital status:    Tobacco Use    Smoking status: Never    Smokeless tobacco: Never   Substance and Sexual Activity    Alcohol use: Not Currently     Comment: social    Drug use: Never    Sexual activity: Yes      Partners: Male     Birth control/protection: None         Allergies   Review of patient's allergies indicates:   Allergen Reactions    Shrimp Nausea And Vomiting       Review of Systems  See HPI        Physical Exam  There were no vitals taken for this visit.    GEN: NAD

## 2022-09-12 ENCOUNTER — PATIENT MESSAGE (OUTPATIENT)
Dept: ENDOCRINOLOGY | Facility: CLINIC | Age: 37
End: 2022-09-12
Payer: OTHER GOVERNMENT

## 2022-09-12 DIAGNOSIS — E03.8 HYPOTHYROIDISM DUE TO HASHIMOTO'S THYROIDITIS: Primary | ICD-10-CM

## 2022-09-12 DIAGNOSIS — E06.3 HYPOTHYROIDISM DUE TO HASHIMOTO'S THYROIDITIS: Primary | ICD-10-CM

## 2022-09-14 ENCOUNTER — PATIENT MESSAGE (OUTPATIENT)
Dept: ENDOCRINOLOGY | Facility: CLINIC | Age: 37
End: 2022-09-14
Payer: OTHER GOVERNMENT

## 2022-09-18 ENCOUNTER — PATIENT MESSAGE (OUTPATIENT)
Dept: SURGERY | Facility: CLINIC | Age: 37
End: 2022-09-18
Payer: OTHER GOVERNMENT

## 2022-09-18 ENCOUNTER — PATIENT MESSAGE (OUTPATIENT)
Dept: FAMILY MEDICINE | Facility: CLINIC | Age: 37
End: 2022-09-18
Payer: OTHER GOVERNMENT

## 2022-09-19 ENCOUNTER — PATIENT MESSAGE (OUTPATIENT)
Dept: FAMILY MEDICINE | Facility: CLINIC | Age: 37
End: 2022-09-19
Payer: OTHER GOVERNMENT

## 2022-10-03 ENCOUNTER — OFFICE VISIT (OUTPATIENT)
Dept: HEPATOLOGY | Facility: CLINIC | Age: 37
End: 2022-10-03
Payer: OTHER GOVERNMENT

## 2022-10-03 ENCOUNTER — TELEPHONE (OUTPATIENT)
Dept: HEPATOLOGY | Facility: CLINIC | Age: 37
End: 2022-10-03
Payer: OTHER GOVERNMENT

## 2022-10-03 DIAGNOSIS — R85.7: ICD-10-CM

## 2022-10-03 DIAGNOSIS — Z87.19 HISTORY OF CHOLESTASIS DURING PREGNANCY: Primary | ICD-10-CM

## 2022-10-03 DIAGNOSIS — Z87.59 HISTORY OF CHOLESTASIS DURING PREGNANCY: Primary | ICD-10-CM

## 2022-10-03 DIAGNOSIS — R74.8 ELEVATED LIVER ENZYMES: ICD-10-CM

## 2022-10-03 PROCEDURE — 99213 PR OFFICE/OUTPT VISIT, EST, LEVL III, 20-29 MIN: ICD-10-PCS | Mod: 95,,, | Performed by: INTERNAL MEDICINE

## 2022-10-03 PROCEDURE — 99213 OFFICE O/P EST LOW 20 MIN: CPT | Mod: 95,,, | Performed by: INTERNAL MEDICINE

## 2022-10-03 NOTE — Clinical Note
1. Labs every 4 months- she has one set up; needs blood work in 8 motnhs and then return by video visit 2. Return 8 months

## 2022-10-03 NOTE — PROGRESS NOTES
The patient location is: home  The chief complaint leading to consultation is: f/u of liver biopsy    Visit type: audiovisual    Face to Face time with patient: 20 minutes  30 minutes of total time spent on the encounter, which includes face to face time and non-face to face time preparing to see the patient (eg, review of tests), Obtaining and/or reviewing separately obtained history, Documenting clinical information in the electronic or other health record, Independently interpreting results (not separately reported) and communicating results to the patient/family/caregiver, or Care coordination (not separately reported).     Each patient to whom he or she provides medical services by telemedicine is:  (1) informed of the relationship between the physician and patient and the respective role of any other health care provider with respect to management of the patient; and (2) notified that he or she may decline to receive medical services by telemedicine and may withdraw from such care at any time.    Notes:  HEPATOLOGY FOLLOW UP    Referring Physician: New Feliz MD  Current Corresponding Physician: [place] Feliz MD    Shanae Vo is here for follow up of a hx of presumed intrahepatic cholestasis of pregnancy    HPI  This patient saw Dr Brizuela, Hepatology at 22 weeks gestation 8/6/20.  At that time she had pruritus that started at ~18 weeks. Bile acids were elevated, but subsequently fluctuated (see below) She also saw fetal maternal high risk OB at [facility]. No prior hx of itching, cholestasis or liver disease. No family hx of liver disease.    He ordered serologies/autoimmune markers, bile acids as well as liver ultrasound w/ doppler to r/o other possible causes of liver disease for the sake of thoroughness in work-up. His advice was: if bile acids remained high and itching persisted, his plan was to start her on ursodeoxycholic acid. She was not seen in f/u. Because bile acids fluctuated, it was  not clear that she truly had intrahepatic cholestasis of pregnancy and was therefore not initially started on joesph. Towards the end of her pregnancy, her bile acids anh and she was started on joesph with an improvement in her itching.. The itching resolved with delivery.    Labs 20: Tbil 0.2, ALT 42, AST 34, ALKP 152.   HAV IgM-, HBsAG-, HCV Ab-, ASMA-, AMA-  Bile acids 20: 9. Repeat bile acids 20: 4;.20: elevated at 43.7;  down to 26  Labs 10/10/21 (POD#1): Tbil 0.3, ALT 17, AST 33, ALKP 168    Abdomen US with doppler 20: normal    She was admitted to the antepartum service 20 for serial BP monitoring and subsequently delivered 10/9/20 (31w1d) by  due to preeclampsia, severe, cholestasis of pregnancy, NRFHT, groin abscess.     Interval History:   Shanae Vo was seen by video visit 3/3/21 for a new elevation in liver enzymes. After her delivery her pruritus/cholestasis resolved and it did not recur. Because she was feeling very fatigued, her liver enzymes and thryoid were checked. Her enzymes were up:  Labs 21: Tbil 0.4, , , ALKP 373    I recommended a full serologic w/u and an MRI/MRCP:  Labs 3/8/21: Tbil 0.4 ALT 69, AST 40, ALKP 262, bile acids normal at 2  Ceruloplasmin normal at 33, peth negative, PINEDA-, ASMA-, AMA-, IgG normal at 1034, alhpa 1 antitrypsin 114 with normal phenotype of MM  HCV RNA-, HBsAg-, HBcAb-, HBsAb-, HAV IgM-, HAV IgG-  Iron sat 13%, ferritin 21  Of note had seen a rheumatologist x 2 in the past. Had abnormal serologies for both lupus and RA but not diagnosed with either disease. Does have hashimoto's thyroiditis.    MRI/MRCP 3/9/21: Liver: Normal homogeneous background signal.  No focal lesions.  Hepatic and portal veins are patent; Biliary: Gallbladder is decompressed. No filling defects.  No intrahepatic or extrahepatic biliary dilatation; Pancreas: Unremarkable.  No pancreatic ductal dilatation.    She had a liver biopsy  6/18/21 that suggests she may have PFIC:       Genetic testing: negative; more extensive deferred    Labs 7/2922: ALT 19, AST 19, ALKP 67, , bile acids 5    She continues to feel well with no abdo pain, N/V or pruritus. Fatigue has not recurred.    Outpatient Encounter Medications as of 10/3/2022   Medication Sig Dispense Refill    ibuprofen (ADVIL ORAL) Take by mouth as needed.      levothyroxine (SYNTHROID) 75 MCG tablet TAKE 1 TABLET BY MOUTH EVERY DAY BEFORE BREAKFAST ON AN EMPTY STOMACH 90 tablet 3    mupirocin (BACTROBAN) 2 % ointment Apply topically 3 (three) times daily. 30 g 3    albuterol (PROVENTIL/VENTOLIN HFA) 90 mcg/actuation inhaler Inhale 2 puffs into the lungs every 4 (four) hours as needed for Wheezing or Shortness of Breath. Rescue (Patient not taking: Reported on 8/2/2022) 18 g 2     No facility-administered encounter medications on file as of 10/3/2022.     Review of patient's allergies indicates:   Allergen Reactions    Shrimp Nausea And Vomiting     Past Medical History:   Diagnosis Date    Elevated liver enzymes 3/3/2021    Finger infection     right finger- patient will go to urgent care and take care of it    Hypertension     Thyroid disease     hypothyroid       Review of Systems   Constitutional: Negative.    HENT: Negative.    Eyes: Negative.    Respiratory: Negative.    Cardiovascular: Negative.    Gastrointestinal: Negative.    Genitourinary: Negative.    Musculoskeletal: Negative.    Skin: Negative.    Neurological: Negative.    Psychiatric/Behavioral: Negative.        Physical Exam        Lab Results   Component Value Date    GLU 97 07/29/2022    GLU 97 07/29/2022    BUN 10 07/29/2022    BUN 10 07/29/2022    CREATININE 0.7 07/29/2022    CREATININE 0.7 07/29/2022    CALCIUM 9.7 07/29/2022    CALCIUM 9.7 07/29/2022     07/29/2022     07/29/2022    K 4.2 07/29/2022    K 4.2 07/29/2022     07/29/2022     07/29/2022    PROT 7.2 07/29/2022    PROT 7.2  07/29/2022    PROT 7.2 07/29/2022    PROT 7.2 07/29/2022    CO2 28 07/29/2022    CO2 28 07/29/2022    ANIONGAP 7 (L) 07/29/2022    ANIONGAP 7 (L) 07/29/2022    WBC 8.02 07/29/2022    WBC 8.02 07/29/2022    RBC 4.95 07/29/2022    RBC 4.95 07/29/2022    HGB 14.6 07/29/2022    HGB 14.6 07/29/2022    HCT 42.9 07/29/2022    HCT 42.9 07/29/2022    MCV 87 07/29/2022    MCV 87 07/29/2022    MCH 29.5 07/29/2022    MCH 29.5 07/29/2022    MCHC 34.0 07/29/2022    MCHC 34.0 07/29/2022     Lab Results   Component Value Date    RDW 12.0 07/29/2022    RDW 12.0 07/29/2022     07/29/2022     07/29/2022    MPV 9.1 (L) 07/29/2022    MPV 9.1 (L) 07/29/2022    GRAN 5.1 07/29/2022    GRAN 63.8 07/29/2022    LYMPH 2.1 07/29/2022    LYMPH 25.9 07/29/2022    MONO 0.7 07/29/2022    MONO 8.9 07/29/2022    EOSINOPHIL 0.9 07/29/2022    BASOPHIL 0.4 07/29/2022    EOS 0.1 07/29/2022    BASO 0.03 07/29/2022    APTT 23.6 10/09/2020    GROUPTRH A NEG 10/10/2020    CHOL 218 (H) 07/29/2022    TRIG 115 07/29/2022    HDL 59 07/29/2022    CHOLHDL 27.1 07/29/2022    TOTALCHOLEST 3.7 07/29/2022    ALBUMIN 3.7 07/29/2022    ALBUMIN 3.7 07/29/2022    ALBUMIN 3.7 07/29/2022    ALBUMIN 3.7 07/29/2022    BILIDIR 0.3 07/29/2022    BILIDIR 0.3 07/29/2022    AST 19 07/29/2022    AST 19 07/29/2022    AST 19 07/29/2022    AST 19 07/29/2022    ALT 19 07/29/2022    ALT 19 07/29/2022    ALT 19 07/29/2022    ALT 19 07/29/2022    ALKPHOS 67 07/29/2022    ALKPHOS 67 07/29/2022    ALKPHOS 67 07/29/2022    ALKPHOS 67 07/29/2022    MG 7.2 (HH) 10/09/2020    LABPROT 10.3 07/29/2022    INR 1.0 07/29/2022       Assessment and Plan:  Shanae Vo is a 37 y.o. female with elevated liver enzymes and a liver biopsy that shows marked reduction of BSEP expression. Importantly, there is no fibrosis. Genetic testing was negative. More detailed genetic testing has been deferred. There are reports of pregnancy unmasking genetic predisposition to IHCP such as in ABCB4  and ABCB11 which are implicated in progressive familial intrahepatic cholestasis (PFIC) and benign recurrent intrahepatic cholestasis (BRIC).     Continue to recommend hepatic panel, GGT and bile acids be checked every 4 months. Will check fibroscan in 1-2 years to confirm no fibrosis progression.    Return 8 months

## 2022-11-01 ENCOUNTER — PATIENT MESSAGE (OUTPATIENT)
Dept: FAMILY MEDICINE | Facility: CLINIC | Age: 37
End: 2022-11-01
Payer: OTHER GOVERNMENT

## 2022-11-03 ENCOUNTER — OFFICE VISIT (OUTPATIENT)
Dept: FAMILY MEDICINE | Facility: CLINIC | Age: 37
End: 2022-11-03
Payer: OTHER GOVERNMENT

## 2022-11-03 VITALS
HEART RATE: 85 BPM | BODY MASS INDEX: 32.67 KG/M2 | OXYGEN SATURATION: 99 % | SYSTOLIC BLOOD PRESSURE: 108 MMHG | WEIGHT: 173.06 LBS | TEMPERATURE: 98 F | DIASTOLIC BLOOD PRESSURE: 68 MMHG | HEIGHT: 61 IN

## 2022-11-03 DIAGNOSIS — R09.82 POST-NASAL DRIP: ICD-10-CM

## 2022-11-03 DIAGNOSIS — R09.81 NASAL CONGESTION: ICD-10-CM

## 2022-11-03 DIAGNOSIS — J40 BRONCHITIS: Primary | ICD-10-CM

## 2022-11-03 DIAGNOSIS — R05.1 ACUTE COUGH: ICD-10-CM

## 2022-11-03 PROCEDURE — 99214 PR OFFICE/OUTPT VISIT, EST, LEVL IV, 30-39 MIN: ICD-10-PCS | Mod: S$PBB,,, | Performed by: FAMILY MEDICINE

## 2022-11-03 PROCEDURE — 99214 OFFICE O/P EST MOD 30 MIN: CPT | Mod: S$PBB,,, | Performed by: FAMILY MEDICINE

## 2022-11-03 PROCEDURE — 99999 PR PBB SHADOW E&M-EST. PATIENT-LVL IV: CPT | Mod: PBBFAC,,, | Performed by: FAMILY MEDICINE

## 2022-11-03 PROCEDURE — 96372 THER/PROPH/DIAG INJ SC/IM: CPT | Mod: PBBFAC,PO

## 2022-11-03 PROCEDURE — 99214 OFFICE O/P EST MOD 30 MIN: CPT | Mod: PBBFAC,PO | Performed by: FAMILY MEDICINE

## 2022-11-03 PROCEDURE — 99999 PR PBB SHADOW E&M-EST. PATIENT-LVL IV: ICD-10-PCS | Mod: PBBFAC,,, | Performed by: FAMILY MEDICINE

## 2022-11-03 RX ORDER — TRIAMCINOLONE ACETONIDE 40 MG/ML
40 INJECTION, SUSPENSION INTRA-ARTICULAR; INTRAMUSCULAR
Status: SHIPPED | OUTPATIENT
Start: 2022-11-03

## 2022-11-03 RX ORDER — FLUTICASONE PROPIONATE 50 MCG
1 SPRAY, SUSPENSION (ML) NASAL 2 TIMES DAILY PRN
Qty: 16 ML | Refills: 1 | Status: SHIPPED | OUTPATIENT
Start: 2022-11-03 | End: 2022-12-14

## 2022-11-03 RX ORDER — AZITHROMYCIN 250 MG/1
250 TABLET, FILM COATED ORAL DAILY
Qty: 6 TABLET | Refills: 0 | Status: SHIPPED | OUTPATIENT
Start: 2022-11-03 | End: 2022-11-23 | Stop reason: ALTCHOICE

## 2022-11-03 RX ORDER — PROMETHAZINE HYDROCHLORIDE AND DEXTROMETHORPHAN HYDROBROMIDE 6.25; 15 MG/5ML; MG/5ML
5 SYRUP ORAL 3 TIMES DAILY PRN
Qty: 118 ML | Refills: 1 | Status: SHIPPED | OUTPATIENT
Start: 2022-11-03 | End: 2022-11-23

## 2022-11-03 RX ORDER — LORATADINE 10 MG/1
10 TABLET ORAL DAILY
Qty: 60 TABLET | Refills: 2 | Status: SHIPPED | OUTPATIENT
Start: 2022-11-03 | End: 2023-03-28

## 2022-11-03 RX ORDER — TRIAMCINOLONE ACETONIDE 40 MG/ML
40 INJECTION, SUSPENSION INTRA-ARTICULAR; INTRAMUSCULAR
Status: COMPLETED | OUTPATIENT
Start: 2022-11-03 | End: 2022-11-03

## 2022-11-03 RX ADMIN — TRIAMCINOLONE ACETONIDE 40 MG: 40 INJECTION, SUSPENSION INTRA-ARTICULAR; INTRAMUSCULAR at 01:11

## 2022-11-03 NOTE — PROGRESS NOTES
"Physical Exam  /68   Pulse 85   Temp 98.1 °F (36.7 °C) (Oral)   Ht 5' 1" (1.549 m)   Wt 78.5 kg (173 lb 1 oz)   LMP 2022   SpO2 99%   BMI 32.70 kg/m²      Office Visit    Patient Name: Shanae Vo    : 1985  MRN: [id number]      Assessment/Plan:  Shanae Vo is a 37 y.o. female who presents today for :    Bronchitis  -     azithromycin (ZITHROMAX Z-GUILLERMO) 250 MG tablet; Take 1 tablet (250 mg total) by mouth once daily. Take 2 tablets by mouth for day 1 and take 1 tablet by mouth on days 2-5  Dispense: 6 tablet; Refill: 0  -     triamcinolone acetonide injection 40 mg  Post-nasal drip  -     loratadine (CLARITIN) 10 mg tablet; Take 1 tablet (10 mg total) by mouth once daily.  Dispense: 60 tablet; Refill: 2  -     fluticasone propionate (FLONASE) 50 mcg/actuation nasal spray; 1 spray (50 mcg total) by Each Nostril route 2 (two) times daily as needed for Rhinitis.  Dispense: 16 mL; Refill: 1  Acute cough  -     promethazine-dextromethorphan (PROMETHAZINE-DM) 6.25-15 mg/5 mL Syrp; Take 5 mLs by mouth 3 (three) times daily as needed (cough).  Dispense: 118 mL; Refill: 1  -     X-Ray Chest PA And Lateral; Future; Expected date: 2022  Nasal congestion  -start Abx. Counseled pt that cough may persist for up to 2-3 weeks. Advised to continue supportive therapy and symptom management. May try Nasal Saline Rinses. Cepacol prn for sore throat. Claritin PRN for drainage  -stressed hydration (water) and rest, as well as frequent hand washing and covering coughs to prevent spread of respiratory illnesses  -     Tylenol every 4-6 hours as needed for fever, headaches, sore throat, ear pain, bodyaches, and/or nasal/sinus inflammation  -RTC if Sx worsens or call clinic back if pt has any concerns.      Follow up for worsening Sx. Urgent care/ED precautions provided.        This note was created by combination of typed  and MModal dictation.  Transcription errors may be present.  If " there are any questions, please contact me.      ----------------------------------------------------------------------------------------------------------------------      HPI:  Patient Care Team:  [place] MD as PCP - General (Family Medicine)    Shanae is a 37 y.o. female with      Patient Active Problem List   Diagnosis    Endometrioma of ovary    Itching    -Hypothyroidism due to Hashimoto's thyroiditis - controlled on Levothyroxine - follows Dr. 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 Initially, she had severe sore throat that has since resolved, but she still has persistent productive cough of clear phlegm. In addition, she also has a lot of throat drainage as well as runny nose. Her child was sick with similar Sx the past week. She has taken OTC meds at home that has not provided relief. Patient denies any   fever/chills/N/V/ear pain/SOB/body aches/loss of taste nor smell.      Additional ROS    No dysphagia  No CP/palpitations/swelling  No abd pain/no diarrhea  No rashes      Patient Active Problem List   Diagnosis    Endometrioma of ovary    Itching    -Hypothyroidism due to Hashimoto's thyroiditis - controlled on Levothyroxine - follows Dr. 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       Current Outpatient Medications:     ibuprofen (ADVIL ORAL), Take by mouth as needed., Disp: , Rfl:     levothyroxine (SYNTHROID) 75 MCG tablet, TAKE 1 TABLET BY MOUTH EVERY DAY BEFORE BREAKFAST ON AN EMPTY STOMACH, Disp: 90 tablet, Rfl: 3    mupirocin (BACTROBAN) 2 % ointment, Apply topically 3 (three) times daily., Disp: 30 g, Rfl: 3    albuterol (PROVENTIL/VENTOLIN HFA) 90 mcg/actuation inhaler, Inhale 2 puffs into the lungs every 4 (four) hours as needed for Wheezing or Shortness of Breath. Rescue (Patient not taking: Reported on 2022), Disp: 18 g, Rfl: 2    azithromycin (ZITHROMAX Z-GUILLERMO) 250 MG tablet, Take 1 tablet (250 mg total) by mouth once daily. Take 2 tablets by mouth for day 1 and take 1 tablet by mouth on days 2-5, Disp: 6 tablet, Rfl: 0    fluticasone propionate (FLONASE) 50 mcg/actuation nasal spray, 1 spray (50 mcg total) by Each Nostril route 2 (two) times daily as needed for Rhinitis., Disp: 16 mL, Rfl: 1    loratadine (CLARITIN) 10 mg tablet, Take 1 tablet (10 mg total) by mouth once daily., Disp: 60 tablet, Rfl: 2    promethazine-dextromethorphan (PROMETHAZINE-DM) 6.25-15 mg/5 mL Syrp, Take 5 mLs by mouth 3 (three) times daily as needed (cough)., Disp: 118 mL, Rfl: 1    Current Facility-Administered Medications:     triamcinolone acetonide injection 40 mg, 40 mg, Intramuscular, 1 time in Clinic/HOD, [place] MD    Past Surgical History:   Procedure Laterality Date    BIOPSY OF LIVER WITH ULTRASOUND GUIDANCE N/A 2021    Procedure: BIOPSY, LIVER, WITH US GUIDANCE;  Surgeon: Baldev Chino MD;  Location: [facility] CATH LAB;  Service: Radiology;  Laterality: N/A;     SECTION N/A 10/12/2020    Procedure:  SECTION;  Surgeon: Harmony Singh MD;  Location: [facility] L&D;  Service: OB/GYN;  Laterality: N/A;     SECTION      DIAGNOSTIC LAPAROSCOPY N/A 10/28/2019    Procedure: LAPAROSCOPY, DIAGNOSTIC;  Surgeon: Luis Armando  "JOSIAH Vega MD;  [facility];  Service: OB/GYN;  Laterality: N/A;    LAPAROSCOPIC SURGICAL REMOVAL OF CYST OF OVARY Left 10/28/2019    Procedure: EXCISION, CYST, OVARY, LAPAROSCOPIC, LEFT;  Surgeon: Luis Armando Vega MD;  Location: [facility] OR;  Service: OB/GYN;  Laterality: Left;    NEEDLE LOCALIZATION N/A 6/18/2021    Procedure: NEEDLE LOCALIZATION;  Surgeon: Baldev Chino MD;  Location: [facility] CATH LAB;  Service: Radiology;  Laterality: N/A;    WISDOM TOOTH EXTRACTION         Family History   Problem Relation Age of Onset    Colon cancer Paternal Uncle     Cataracts Mother     No Known Problems Father     No Known Problems Sister     No Known Problems Brother     No Known Problems Maternal Aunt     No Known Problems Maternal Uncle     No Known Problems Paternal Aunt     No Known Problems Maternal Grandmother     No Known Problems Maternal Grandfather     No Known Problems Paternal Grandmother     No Known Problems Paternal Grandfather     Breast cancer Neg Hx     Ovarian cancer Neg Hx        Social History     Socioeconomic History    Marital status:    Tobacco Use    Smoking status: Never    Smokeless tobacco: Never   Substance and Sexual Activity    Alcohol use: Not Currently     Comment: social    Drug use: Never    Sexual activity: Yes     Partners: Male     Birth control/protection: None             Allergies   Review of patient's allergies indicates:   Allergen Reactions    Shrimp Nausea And Vomiting             Review of Systems  See HPI      [unfilled]  /68   Pulse 85   Temp 98.1 °F (36.7 °C) (Oral)   Ht 5' 1" (1.549 m)   Wt 78.5 kg (173 lb 1 oz)   LMP 09/26/2022   SpO2 99%   BMI 32.70 kg/m²       GEN: NAD, well developed  HEENT: NCAT, PERRLA, EOMI, sclera clear, anicteric, TM clear bilaterally with normal light reflex, mild nasal turbinate swelling, MMM with no lesions, O/P clear - no tonsillar swelling/discharge, +moderate drainage, +minimal clear nasal discharge, no " frontal/maxillary TTP, No trismus/uvula deviation  NECK: normal, supple with midline trachea, no LAD, no thyromegaly  LUNGS: minimal scattered rhonchi bilaterally without decreased breath sounds, no wheezing/rales, no increased work of breathing  HEART: RRR, normal S1 and S2, no m/r/g   ABD: s/nt/nd, NABS  SKIN: normal turgor, no rashes, no other lesions.   PSYCH: AOx3, appropriate mood and affect

## 2022-11-04 ENCOUNTER — PATIENT MESSAGE (OUTPATIENT)
Dept: FAMILY MEDICINE | Facility: CLINIC | Age: 37
End: 2022-11-04
Payer: OTHER GOVERNMENT

## 2022-11-06 ENCOUNTER — PATIENT MESSAGE (OUTPATIENT)
Dept: FAMILY MEDICINE | Facility: CLINIC | Age: 37
End: 2022-11-06
Payer: OTHER GOVERNMENT

## 2022-11-07 PROBLEM — Z00.00 ANNUAL PHYSICAL EXAM: Status: RESOLVED | Noted: 2022-08-02 | Resolved: 2022-11-07

## 2022-11-15 ENCOUNTER — LAB VISIT (OUTPATIENT)
Dept: LAB | Facility: HOSPITAL | Age: 37
End: 2022-11-15
Attending: INTERNAL MEDICINE
Payer: OTHER GOVERNMENT

## 2022-11-15 DIAGNOSIS — R74.8 ELEVATED LIVER ENZYMES: ICD-10-CM

## 2022-11-15 DIAGNOSIS — E03.8 HYPOTHYROIDISM DUE TO HASHIMOTO'S THYROIDITIS: ICD-10-CM

## 2022-11-15 DIAGNOSIS — E06.3 HYPOTHYROIDISM DUE TO HASHIMOTO'S THYROIDITIS: ICD-10-CM

## 2022-11-15 LAB
ALBUMIN SERPL BCP-MCNC: 3.9 G/DL (ref 3.5–5.2)
ALP SERPL-CCNC: 81 U/L (ref 55–135)
ALT SERPL W/O P-5'-P-CCNC: 17 U/L (ref 10–44)
AST SERPL-CCNC: 18 U/L (ref 10–40)
BILIRUB DIRECT SERPL-MCNC: 0.2 MG/DL (ref 0.1–0.3)
BILIRUB SERPL-MCNC: 0.4 MG/DL (ref 0.1–1)
GGT SERPL-CCNC: 96 U/L (ref 8–55)
PROT SERPL-MCNC: 7.7 G/DL (ref 6–8.4)
T4 FREE SERPL-MCNC: 1.24 NG/DL (ref 0.71–1.51)
TSH SERPL DL<=0.005 MIU/L-ACNC: 1.52 UIU/ML (ref 0.4–4)

## 2022-11-15 PROCEDURE — 82977 ASSAY OF GGT: CPT | Performed by: INTERNAL MEDICINE

## 2022-11-15 PROCEDURE — 80076 HEPATIC FUNCTION PANEL: CPT | Performed by: INTERNAL MEDICINE

## 2022-11-15 PROCEDURE — 82239 BILE ACIDS TOTAL: CPT | Performed by: INTERNAL MEDICINE

## 2022-11-15 PROCEDURE — 84443 ASSAY THYROID STIM HORMONE: CPT | Performed by: HOSPITALIST

## 2022-11-15 PROCEDURE — 84439 ASSAY OF FREE THYROXINE: CPT | Performed by: HOSPITALIST

## 2022-11-16 LAB — BILE AC SERPL-SCNC: 3 MCMOL/L

## 2022-11-23 ENCOUNTER — E-VISIT (OUTPATIENT)
Dept: FAMILY MEDICINE | Facility: CLINIC | Age: 37
End: 2022-11-23
Payer: OTHER GOVERNMENT

## 2022-11-23 ENCOUNTER — PATIENT MESSAGE (OUTPATIENT)
Dept: FAMILY MEDICINE | Facility: CLINIC | Age: 37
End: 2022-11-23

## 2022-11-23 DIAGNOSIS — B96.89 BACTERIAL SINUSITIS: Primary | ICD-10-CM

## 2022-11-23 DIAGNOSIS — J32.9 BACTERIAL SINUSITIS: Primary | ICD-10-CM

## 2022-11-23 PROCEDURE — 99421 PR E&M, ONLINE DIGIT, EST, < 7 DAYS, 5-10 MINS: ICD-10-PCS | Mod: ,,, | Performed by: FAMILY MEDICINE

## 2022-11-23 PROCEDURE — 99421 OL DIG E/M SVC 5-10 MIN: CPT | Mod: ,,, | Performed by: FAMILY MEDICINE

## 2022-11-23 RX ORDER — AMOXICILLIN AND CLAVULANATE POTASSIUM 875; 125 MG/1; MG/1
1 TABLET, FILM COATED ORAL EVERY 12 HOURS
Qty: 20 TABLET | Refills: 0 | Status: SHIPPED | OUTPATIENT
Start: 2022-11-23 | End: 2022-12-14

## 2022-11-23 RX ORDER — PROMETHAZINE HYDROCHLORIDE AND DEXTROMETHORPHAN HYDROBROMIDE 6.25; 15 MG/5ML; MG/5ML
SYRUP ORAL
Qty: 240 ML | Refills: 0 | Status: SHIPPED | OUTPATIENT
Start: 2022-11-23 | End: 2022-12-14

## 2022-11-23 NOTE — PROGRESS NOTES
Patient ID: Shanae Vo is a 37 y.o. female.    Chief Complaint: URI    The patient initiated a request through [employer] on 11/23/2022 for evaluation and management with a chief complaint of URI     I evaluated the questionnaire submission on 11/23/2022  .    Ohs Peq Evisit Upper Respitatory/Cough Questionnaire    11/23/2022  9:55 AM CST - Filed by Patient   Do you agree to participate in an E-Visit? Yes   If you have any of the following symptoms, please present to your local ER or call 911:  I acknowledge   What is the main issue that you would like for your doctor to address today? Sinus infection   Are you able to take your vital signs? No   What symptoms do you currently have?  Cough;  Fatigue;  Headache;  Nasal Congestion;  Runny nose   Have you had a fever? No   When did your symptoms first appear? 11/15/2022   In the last two weeks, have you been in close contact with someone who has COVID-19 or the Flu? No   In the last two weeks, have you worked or volunteered in a healthcare facility or as a ? Healthcare facilities include a hospital, medical or dental clinic, long-term care facility, or nursing home No   Do you live in a long-term care facility, nursing home, or homeless shelter? No   List what you have done or taken to help your symptoms. Claritin flonase advil robitussin   How severe are your symptoms? Moderate   Have you taken an at home Covid test? Yes   What were the results? Negative   Have you taken a Flu test? No   Have you been fully vaccinated for COVID? (2 Pfizer, 2 Moderna or 1 Jose & Jose vaccine injections) Yes   Have you received a booster? No   Have you recieved a Flu shot? No   Do you have transportation to get tested for COVID if it is indicated and ordered for you at an [facility] location? 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 Wesley 10/22/2019      Recent Labs Obtained:  No visits with results within 7 Day(s) from this visit.   Latest known visit with results is:   Lab Visit on 11/15/2022   Component Date Value Ref Range Status    Total Protein 11/15/2022 7.7  6.0 - 8.4 g/dL Final    Albumin 11/15/2022 3.9  3.5 - 5.2 g/dL Final    Total Bilirubin 11/15/2022 0.4  0.1 - 1.0 mg/dL Final    Comment: For infants and newborns, interpretation of results should be based  on gestational age, weight and in agreement with clinical  observations.    Premature Infant recommended reference ranges:  Up to 24 hours.............<8.0 mg/dL  Up to 48 hours............<12.0 mg/dL  3-5 days..................<15.0 mg/dL  6-29 days.................<15.0 mg/dL      Bilirubin, Direct 11/15/2022 0.2  0.1 - 0.3 mg/dL Final    AST 11/15/2022 18  10 - 40 U/L Final    ALT 11/15/2022 17  10 - 44 U/L Final    Alkaline Phosphatase 11/15/2022 81  55 - 135 U/L Final    GGT 11/15/2022 96 (H)  8 - 55 U/L Final    Bile Acids Total 11/15/2022 3  <=10 mcmol/L Final    Comment: Test Performed by:  [address] [address]  : Buck Clark M.D. Ph.D.; CLIA# [id number]      TSH 11/15/2022 1.525  0.400 - 4.000 uIU/mL Final    Free T4 11/15/2022 1.24  0.71 - 1.51 ng/dL Final       Encounter Diagnosis   Name  Primary?    Bacterial sinusitis Yes        No orders of the defined types were placed in this encounter.     Medications Ordered This Encounter   Medications    amoxicillin-clavulanate 875-125mg (AUGMENTIN) 875-125 mg per tablet     Sig: Take 1 tablet by mouth every 12 (twelve) hours.     Dispense:  20 tablet     Refill:  0    promethazine-dextromethorphan (PROMETHAZINE-DM) 6.25-15 mg/5 mL Syrp     Sig: Take 10-15ml by mouth every 8 hours as needed for coughing     Dispense:  240 mL     Refill:  0        E-Visit Time Tracking:    Day 1 Time (in minutes): 8     Total Time (in minutes): 8

## 2022-12-14 ENCOUNTER — OFFICE VISIT (OUTPATIENT)
Dept: ENDOCRINOLOGY | Facility: CLINIC | Age: 37
End: 2022-12-14
Payer: OTHER GOVERNMENT

## 2022-12-14 VITALS
HEART RATE: 71 BPM | WEIGHT: 174.63 LBS | DIASTOLIC BLOOD PRESSURE: 65 MMHG | SYSTOLIC BLOOD PRESSURE: 101 MMHG | TEMPERATURE: 98 F | BODY MASS INDEX: 32.99 KG/M2

## 2022-12-14 DIAGNOSIS — E04.9 GOITER: Primary | ICD-10-CM

## 2022-12-14 DIAGNOSIS — E06.3 HYPOTHYROIDISM DUE TO HASHIMOTO'S THYROIDITIS: ICD-10-CM

## 2022-12-14 DIAGNOSIS — E03.8 HYPOTHYROIDISM DUE TO HASHIMOTO'S THYROIDITIS: ICD-10-CM

## 2022-12-14 PROBLEM — Z87.59 HISTORY OF CHOLESTASIS DURING PREGNANCY: Status: RESOLVED | Noted: 2020-09-30 | Resolved: 2022-12-14

## 2022-12-14 PROBLEM — Z87.19 HISTORY OF CHOLESTASIS DURING PREGNANCY: Status: RESOLVED | Noted: 2020-09-30 | Resolved: 2022-12-14

## 2022-12-14 PROCEDURE — 99999 PR PBB SHADOW E&M-EST. PATIENT-LVL IV: CPT | Mod: PBBFAC,,, | Performed by: HOSPITALIST

## 2022-12-14 PROCEDURE — 99999 PR PBB SHADOW E&M-EST. PATIENT-LVL IV: ICD-10-PCS | Mod: PBBFAC,,, | Performed by: HOSPITALIST

## 2022-12-14 PROCEDURE — 99214 OFFICE O/P EST MOD 30 MIN: CPT | Mod: PBBFAC | Performed by: HOSPITALIST

## 2022-12-14 PROCEDURE — 99214 OFFICE O/P EST MOD 30 MIN: CPT | Mod: S$PBB,,, | Performed by: HOSPITALIST

## 2022-12-14 PROCEDURE — 99214 PR OFFICE/OUTPT VISIT, EST, LEVL IV, 30-39 MIN: ICD-10-PCS | Mod: S$PBB,,, | Performed by: HOSPITALIST

## 2022-12-14 NOTE — ASSESSMENT & PLAN NOTE
- Pt with history of hypothyroidism due to Hashimoto, chronic in nature  - Pathophysiology of hypothyroidism, the role of TSH, free T4 were explained to patient  - TSH and free T4 lab work trend reviewed with patient in clinic and discussed  - Currently on levothyroxine 75mcg daily  - given TFTs, we will continue current dose  - Discuss and confirm with patient proper way of taking LT4: on an empty stomach with water and to wait 30-45 minutes before eating or taking other medications   - Once TSH is stable at WNL range, PCP to continue refills given chronic nature  - No plan for pregnancy at this time, advised to contact us medication adjustment if she would become pregnant

## 2022-12-14 NOTE — PROGRESS NOTES
Subjective:      Patient ID: Shanae Vo is a 37 y.o. female presented to [facility] on 12/14/2022.  Chief Complaint:  Hypothyroidism      History of Present Illness: Shanae Vo is a 37 y.o. female here for management of hypothyroidism, Hashimoto  Other significant past medical history: elevated liver enzymes     Interval hx:  Patient is here for follow-up of hypothyroidism.  Has been doing well.  Medication compliant with levothyroxine 75 mcg daily.    Following with hepatology for elevated liver enzymes/elevated GTT  Patient know improvement fatigue, cold intolerance  Plan for future pregnancy at this time:  No,  vasectomy     Diagnosed with hypothyroidism:   - In the , moved from [place]   - Recently pregnancy 10/2020, premature delievery: elevated liver enzymes, cholestasis>> elevated liver still, unclear etiology > plan for biopsy  - Has been on levothyroxine since 18 years old  - Brother in law with thyroid cancer but she has no family hx of thyroid problem  - Family history thyroid disorder: no  - Thyroid goiter: denies  - Generic levothyroxine 75 mcg daily   - Takes thyroid medication properly without food first thing in the morning     Medications:  [x]    []   Lithium Use  [x]    []   Amiodarone use  [x]    []   Chemotherapy   [x]    []   Estrogen therapy  [x]    []   Supplements: Kelp, Iodine, Biotin, Selenium, Sea velazquez, Bladder wrack    [x]    []   Infertility/abnormal menstruation  []    [x]   Recent pregnancy    Lab Results   Component Value Date    TSH 1.525 11/15/2022    TSH 1.761 05/02/2022    TSH 1.819 11/01/2021    FREET4 1.24 11/15/2022    FREET4 1.05 05/02/2022    FREET4 1.08 11/01/2021    THYROPEROXID 764.6 (H) 07/06/2021     Reviewed past surgical, medical, family, social history and updated as appropriate.    Review of Systems: see HPI above    Objective:   /65 (BP Location: Left arm)   Pulse 71   Temp 98.1 °F (36.7 °C) (Oral)   Wt 79.2  kg (174 lb 9.6 oz)   BMI 32.99 kg/m²     Body mass index is 32.99 kg/m².  Vital signs reviewed    Physical Exam  Vitals and nursing note reviewed.   Constitutional:       Appearance: Normal appearance. She is well-developed. She is obese. She is not ill-appearing.   Neck:      Thyroid: No thyromegaly.      Comments: Right thyroid nodule?   Pulmonary:      Effort: Pulmonary effort is normal. No respiratory distress.   Musculoskeletal:         General: Normal range of motion.      Cervical back: Normal range of motion.   Neurological:      General: No focal deficit present.      Mental Status: She is alert. Mental status is at baseline.   Psychiatric:         Mood and Affect: Mood normal.         Behavior: Behavior normal.     Lab Reviewed:   Lab Results   Component Value Date    HGBA1C 5.2 07/29/2022     Lab Results   Component Value Date    CHOL 218 (H) 07/29/2022    HDL 59 07/29/2022    LDLCALC 136.0 07/29/2022    TRIG 115 07/29/2022    CHOLHDL 27.1 07/29/2022     Lab Results   Component Value Date     07/29/2022     07/29/2022    K 4.2 07/29/2022    K 4.2 07/29/2022     07/29/2022     07/29/2022    CO2 28 07/29/2022    CO2 28 07/29/2022    GLU 97 07/29/2022    GLU 97 07/29/2022    BUN 10 07/29/2022    BUN 10 07/29/2022    CREATININE 0.7 07/29/2022    CREATININE 0.7 07/29/2022    CALCIUM 9.7 07/29/2022    CALCIUM 9.7 07/29/2022    PROT 7.7 11/15/2022    ALBUMIN 3.9 11/15/2022    BILITOT 0.4 11/15/2022    ALKPHOS 81 11/15/2022    AST 18 11/15/2022    ALT 17 11/15/2022    ANIONGAP 7 (L) 07/29/2022    ANIONGAP 7 (L) 07/29/2022    ESTGFRAFRICA >60 07/29/2022    ESTGFRAFRICA >60 07/29/2022    EGFRNONAA >60 07/29/2022    EGFRNONAA >60 07/29/2022    TSH 1.525 11/15/2022     Lab Results   Component Value Date    QSFDTQXC21BJ 30 07/29/2022    XDVMFGYG19MV 25 (L) 07/06/2021    CALCIUM 9.7 07/29/2022    CALCIUM 9.7 07/29/2022    CALCIUM 10.3 11/01/2021    PHOS 3.8 10/03/2020    ALKPHOS 81 11/15/2022     ALKPHOS 67 07/29/2022    ALKPHOS 67 07/29/2022    ALKPHOS 67 07/29/2022    ALKPHOS 67 07/29/2022    TSH 1.525 11/15/2022    TTGIGA 3 03/08/2021       Assessment     1. Goiter        2. Hypothyroidism due to Hashimoto's thyroiditis  US Soft Tissue Head Neck Thyroid           Plan     Hypothyroidism due to Hashimoto's thyroiditis - controlled on Levothyroxine - follows Dr. Wesley  - Pt with history of hypothyroidism due to Hashimoto, chronic in nature  - Pathophysiology of hypothyroidism, the role of TSH, free T4 were explained to patient  - TSH and free T4 lab work trend reviewed with patient in clinic and discussed  - Currently on levothyroxine 75mcg daily  - given TFTs, we will continue current dose  - Discuss and confirm with patient proper way of taking LT4: on an empty stomach with water and to wait 30-45 minutes before eating or taking other medications   - Once TSH is stable at WNL range, PCP to continue refills given chronic nature  - No plan for pregnancy at this time, advised to contact us medication adjustment if she would become pregnant  - check lab work every 6 months    Goiter  - history of Hashimoto's thyroiditis hypothyroidism as above  - high risk of developing thyroid nodule, will get formal thyroid ultrasound to re-evaluate      Advised patient to follow up with PCP for routine health maintenance care.  RTC in yearly or sooner if needed    Samuel Wesley M.D  Endocrinology  [facility]  12/14/2022      Disclaimer: This note has been generated using voice-recognition software. There may be typographical errors that have been missed during proof-reading.

## 2022-12-28 ENCOUNTER — HOSPITAL ENCOUNTER (OUTPATIENT)
Dept: RADIOLOGY | Facility: HOSPITAL | Age: 37
Discharge: HOME | End: 2022-12-28
Attending: HOSPITALIST
Payer: OTHER GOVERNMENT

## 2022-12-28 DIAGNOSIS — E06.3 HYPOTHYROIDISM DUE TO HASHIMOTO'S THYROIDITIS: ICD-10-CM

## 2022-12-28 DIAGNOSIS — E03.8 HYPOTHYROIDISM DUE TO HASHIMOTO'S THYROIDITIS: ICD-10-CM

## 2022-12-28 PROCEDURE — 76536 US SOFT TISSUE HEAD NECK THYROID: ICD-10-PCS | Mod: 26,,, | Performed by: INTERNAL MEDICINE

## 2022-12-28 PROCEDURE — 76536 US EXAM OF HEAD AND NECK: CPT | Mod: 26,,, | Performed by: INTERNAL MEDICINE

## 2022-12-28 PROCEDURE — 76536 US EXAM OF HEAD AND NECK: CPT | Mod: TC

## 2022-12-29 ENCOUNTER — PATIENT MESSAGE (OUTPATIENT)
Dept: ENDOCRINOLOGY | Facility: CLINIC | Age: 37
End: 2022-12-29
Payer: OTHER GOVERNMENT

## 2023-03-15 ENCOUNTER — OFFICE VISIT (OUTPATIENT)
Dept: DERMATOLOGY | Facility: CLINIC | Age: 38
End: 2023-03-15
Payer: OTHER GOVERNMENT

## 2023-03-15 ENCOUNTER — LAB VISIT (OUTPATIENT)
Dept: LAB | Facility: HOSPITAL | Age: 38
End: 2023-03-15
Attending: INTERNAL MEDICINE
Payer: OTHER GOVERNMENT

## 2023-03-15 DIAGNOSIS — D18.01 CHERRY ANGIOMA: ICD-10-CM

## 2023-03-15 DIAGNOSIS — L91.8 ST (SKIN TAG): ICD-10-CM

## 2023-03-15 DIAGNOSIS — D48.5 NEOPLASM OF UNCERTAIN BEHAVIOR OF SKIN: Primary | ICD-10-CM

## 2023-03-15 DIAGNOSIS — R74.8 ELEVATED LIVER ENZYMES: ICD-10-CM

## 2023-03-15 DIAGNOSIS — D22.9 MULTIPLE BENIGN NEVI: ICD-10-CM

## 2023-03-15 LAB
ALBUMIN SERPL BCP-MCNC: 3.8 G/DL (ref 3.5–5.2)
ALP SERPL-CCNC: 89 U/L (ref 55–135)
ALT SERPL W/O P-5'-P-CCNC: 28 U/L (ref 10–44)
AST SERPL-CCNC: 26 U/L (ref 10–40)
BILIRUB DIRECT SERPL-MCNC: 0.2 MG/DL (ref 0.1–0.3)
BILIRUB SERPL-MCNC: 0.5 MG/DL (ref 0.1–1)
GGT SERPL-CCNC: 156 U/L (ref 8–55)
PROT SERPL-MCNC: 7.4 G/DL (ref 6–8.4)

## 2023-03-15 PROCEDURE — 11102 PR TANGENTIAL BIOPSY, SKIN, SINGLE LESION: ICD-10-PCS | Mod: S$PBB,,, | Performed by: DERMATOLOGY

## 2023-03-15 PROCEDURE — 88341 IMHCHEM/IMCYTCHM EA ADD ANTB: CPT | Performed by: PATHOLOGY

## 2023-03-15 PROCEDURE — 88342 IMHCHEM/IMCYTCHM 1ST ANTB: CPT | Performed by: PATHOLOGY

## 2023-03-15 PROCEDURE — 82239 BILE ACIDS TOTAL: CPT | Performed by: INTERNAL MEDICINE

## 2023-03-15 PROCEDURE — 99999 PR PBB SHADOW E&M-EST. PATIENT-LVL III: ICD-10-PCS | Mod: PBBFAC,,, | Performed by: DERMATOLOGY

## 2023-03-15 PROCEDURE — 11102 TANGNTL BX SKIN SINGLE LES: CPT | Mod: S$PBB,,, | Performed by: DERMATOLOGY

## 2023-03-15 PROCEDURE — 99999 PR PBB SHADOW E&M-EST. PATIENT-LVL III: CPT | Mod: PBBFAC,,, | Performed by: DERMATOLOGY

## 2023-03-15 PROCEDURE — 36415 COLL VENOUS BLD VENIPUNCTURE: CPT | Performed by: INTERNAL MEDICINE

## 2023-03-15 PROCEDURE — 82977 ASSAY OF GGT: CPT | Performed by: INTERNAL MEDICINE

## 2023-03-15 PROCEDURE — 88341 PR IHC OR ICC EACH ADD'L SINGLE ANTIBODY  STAINPR: ICD-10-PCS | Mod: 26,,, | Performed by: PATHOLOGY

## 2023-03-15 PROCEDURE — 88305 TISSUE EXAM BY PATHOLOGIST: CPT | Performed by: PATHOLOGY

## 2023-03-15 PROCEDURE — 88341 IMHCHEM/IMCYTCHM EA ADD ANTB: CPT | Mod: 26,,, | Performed by: PATHOLOGY

## 2023-03-15 PROCEDURE — 88342 IMHCHEM/IMCYTCHM 1ST ANTB: CPT | Mod: 26,,, | Performed by: PATHOLOGY

## 2023-03-15 PROCEDURE — 99203 OFFICE O/P NEW LOW 30 MIN: CPT | Mod: 25,S$PBB,, | Performed by: DERMATOLOGY

## 2023-03-15 PROCEDURE — 88305 TISSUE EXAM BY PATHOLOGIST: ICD-10-PCS | Mod: 26,,, | Performed by: PATHOLOGY

## 2023-03-15 PROCEDURE — 80076 HEPATIC FUNCTION PANEL: CPT | Performed by: INTERNAL MEDICINE

## 2023-03-15 PROCEDURE — 99213 OFFICE O/P EST LOW 20 MIN: CPT | Mod: PBBFAC | Performed by: DERMATOLOGY

## 2023-03-15 PROCEDURE — 11102 TANGNTL BX SKIN SINGLE LES: CPT | Mod: PBBFAC | Performed by: DERMATOLOGY

## 2023-03-15 PROCEDURE — 88342 CHG IMMUNOCYTOCHEMISTRY: ICD-10-PCS | Mod: 26,,, | Performed by: PATHOLOGY

## 2023-03-15 PROCEDURE — 99203 PR OFFICE/OUTPT VISIT, NEW, LEVL III, 30-44 MIN: ICD-10-PCS | Mod: 25,S$PBB,, | Performed by: DERMATOLOGY

## 2023-03-15 PROCEDURE — 88305 TISSUE EXAM BY PATHOLOGIST: CPT | Mod: 26,,, | Performed by: PATHOLOGY

## 2023-03-15 NOTE — PATIENT INSTRUCTIONS
Sun Protection      The [facility] would like to remind you of the importance of sun protection all year round and particularly during the summer when the suns rays are the strongest. It has been proven that both acute and chronic sun exposure damages our cells and leads to skin cancer. Beyond skin cancer, the sun causes 90% of the symptoms of premature skin aging, including wrinkles, lentigines (brown spots), and thin, easily bruised skin. Proper sun protection can help prevent these unwanted conditions.    Many patients report that they dont go in the sun. It has been shown that the average person receives 18 hours of incidental sun exposure per week during activities such as walking through parking lots, driving, or sitting next to windows. This accumulates to several bad sunburns per year!    In choosing sunscreen, you want one that protects against both UVA and UVB rays (broad spectrum). It is recommended that you use one of SPF 30 or higher. It is important to apply the sunscreen about 20 minutes prior to sun exposure. Most sunscreens are chemical sunscreens and a reaction must take place in the skin so that they are effective. If they are applied and then you are immediately exposed to the sun or start sweating, this reaction has not had time to take place and you are therefore unprotected. Sunscreen needs to be reapplied every 2 hours if you are participating in water sports or sweating. We recommend Elta MD or CeraVe sunscreens for daily use; however there are many options and it is most important for you to find one that you will use on a consistent basis.    If you have sensitive skin, you may do best with a sunscreen that contains only physical blockers in the active ingredient section. The only physical blockers available in the [place] currently are titanium dioxide or zinc oxide. These are typically thicker and harder to apply, however they afford very good protection.  Neutrogena Sensitive Skin, Blue Lizard Sensitive Skin (pink top) or Neutrogena Pure and Free are popular ones.     Aside from sunscreen, clothes with UV protection (UPF), wide brimmed hats, and sunglasses are other means of sun protection that we recommend.      Based on a recent study (6/2021) and out of an abundance of caution, we are recommending that you AVOID the following sunscreens as they may contain the carcinogen, benzene:    Spray and gel sunscreens  Any [employer] or [employer] brands as well as Max Block and TopCare brands   Neutrogena Ultra Sheer Dry-touch Water Resistant Sunscreen LOTION SPF 70   Neutrogena Sheer Zinc Dry-touch Face Sunscreen LOTION SPF 50   5.   Aveeno Baby Continuous Protection Sensitive Skin Sunscreen LOTION - Broad Spectrum SPF 50    Please note that Benzene is not an ingredient or the degradation product of any ingredient in any sunscreen. This study suggested that the findings are a result of contamination in the manufacturing process. At this point, we don't know how effectively Benzene gets through the skin, if it gets absorbed systemically, and what effects it may have.     We do know that ultraviolet radiation is a well-established carcinogen. Please use daily sun protection/avoidance and use of at least SPF 30, broad-spectrum sunscreen not listed above.                       [facility] - DERMATOLOGY [address]TH [address]  [place] [address]  Dept: [phone]  Dept Fax: [phone]                                                                                 Shave Biopsy Wound Care    Your doctor has performed a shave biopsy today.  A band aid and vaseline ointment has been placed over the site.  This should remain in place for NO LONGER THAN 48 hours.  It is fine to remove the bandaid after 24 hours, if the area is no longer bleeding. It is recommended that you keep the area dry (do not wet)) for the first 24 hours.  After 24 hours, wash  the area with warm soap and water and apply Vaseline jelly.  Many patients prefer to use Neosporin or Bacitracin ointment.  This is acceptable; however, know that you can develop an allergy to this medication even if you have used it safely for years.  It is important to keep the area moist.  Letting it dry out and get air slows healing time, and will worsen the scar.        If you notice increasing redness, tenderness, pain, or yellow drainage at the biopsy site, please notify your doctor.  These are signs of an infection.    If your biopsy site is bleeding, apply firm pressure for 15 minutes straight.  Repeat for another 15 minutes, if it is still bleeding.   If the surgical site continues to bleed, then please contact your doctor.      For [facility] users:   You will receive your biopsy results in [facility] as soon as they are available. Please be assured that your physician/provider will review your results and will then determine what further treatment, evaluation, or planning is required. You should be contacted by your physician's/provider's office within 5 business days of receiving your results; If not, please reach out to directly. This is one more way [facility] is putting you first.     [facility] [place] [address]/ [phone] FAX/ [email or web address]         CRYOSURGERY      Your doctor has used a method called cryosurgery to treat your skin condition. Cryosurgery refers to the use of very cold substances to treat a variety of skin conditions such as warts, pre-skin cancers, molluscum contagiosum, sun spots, and several benign growths. The substance we use in cryosurgery is liquid nitrogen and is so cold (-195 degrees Celsius) that is burns when administered.     Following treatment in the office, the skin may immediately burn and become red. You may find the area around the lesion is affected as well. It is sometimes necessary to treat not only the lesion, but a small area  of the surrounding normal skin to achieve a good response.     A blister, and even a blood filled blister, may form after treatment.   This is a normal response. If the blister is painful, it is acceptable to sterilize a needle and with rubbing alcohol and gently pop the blister. It is important that you gently wash the area with soap and warm water as the blister fluid may contain wart virus if a wart was treated. Do no remove the roof of the blister.     The area treated can take anywhere from 1-3 weeks to heal. Healing time depends on the kind skin lesion treated, the location, and how aggressively the lesion was treated. It is recommended that the areas treated are covered with Vaseline or bacitracin ointment and a band-aid. If a band-aid is not practical, just ointment applied several times per day will do. Keeping these areas moist will speed the healing time.    Treatment with liquid nitrogen can leave a scar. In dark skin, it may be a light or dark scar, in light skin it may be a white or pink scar. These will generally fade with time, but may never go away completely.     If you have any concerns after your treatment, please feel free to call the office.       [facility] [place] [address]/ [phone] FAX/ [email or web address]

## 2023-03-15 NOTE — PROGRESS NOTES
Subjective:       Patient ID:  Shanae Vo is a 37 y.o. female who presents for   Chief Complaint   Patient presents with    Mole     R wrist     Mole    Pt has hx of boils in groin area which started last summer. Stopped shaving in groin folds and hasn't gotten one since. Occ gets them on buttocks, but not today. No problems in axillae.    Also has mole in pubic area that was recommended to have checked by PCP.    Also has mole on R wrist which is new x 2-3 yrs ago.    Review of Systems   Constitutional:  Negative for fever and chills.   Skin:  Negative for itching and rash.      Objective:    Physical Exam   Skin:   Areas Examined (abnormalities noted in diagram):   Head / Face Inspection Performed  Chest / Axilla Inspection Performed  Genitals / Buttocks / Groin Inspection Performed  Back Inspection Performed  RUE Inspected                  Diagram Legend     Erythematous scaling macule/papule c/w actinic keratosis       Vascular papule c/w angioma      Pigmented verrucoid papule/plaque c/w seborrheic keratosis      Yellow umbilicated papule c/w sebaceous hyperplasia      Irregularly shaped tan macule c/w lentigo     1-2 mm smooth white papules consistent with Milia      Movable subcutaneous cyst with punctum c/w epidermal inclusion cyst      Subcutaneous movable cyst c/w pilar cyst      Firm pink to brown papule c/w dermatofibroma      Pedunculated fleshy papule(s) c/w skin tag(s)      Evenly pigmented macule c/w junctional nevus     Mildly variegated pigmented, slightly irregular-bordered macule c/w mildly atypical nevus      Flesh colored to evenly pigmented papule c/w intradermal nevus       Pink pearly papule/plaque c/w basal cell carcinoma      Erythematous hyperkeratotic cursted plaque c/w SCC      Surgical scar with no sign of skin cancer recurrence      Open and closed comedones      Inflammatory papules and pustules      Verrucoid papule consistent consistent with wart     Erythematous  eczematous patches and plaques     Dystrophic onycholytic nail with subungual debris c/w onychomycosis     Umbilicated papule    Erythematous-base heme-crusted tan verrucoid plaque consistent with inflamed seborrheic keratosis     Erythematous Silvery Scaling Plaque c/w Psoriasis     See annotation      Assessment / Plan:    Neoplasm of uncertain behavior of skin  Shave biopsy procedure note:    Shave biopsy performed after verbal consent including risk of infection, scar, recurrence, need for additional treatment of site. Area prepped with alcohol, anesthetized with approximately 1.0cc of 1% lidocaine with epinephrine. Lesional tissue shaved with razor blade. Hemostasis achieved with application of aluminum chloride followed by hyfrecation. No complications. Dressing applied. Wound care explained.    -     Specimen to Pathology, Dermatology  Pathology Orders:       Normal Orders This Visit    Specimen to Pathology, Dermatology     Comments:    Number of Specimens:->1  ------------------------->-------------------------  Spec 1 Procedure:->Biopsy  Spec 1 Clinical Impression:->r/o atypical nevus vs IDN vs  other  Spec 1 Source:->R dorsal wrist    Questions:    Procedure Type: Dermatology and skin neoplasms    Number of Specimens: 1    ------------------------: -------------------------    Spec 1 Procedure: Biopsy    Spec 1 Clinical Impression: r/o atypical nevus vs IDN vs other    Spec 1 Source: R dorsal wrist    Release to patient:           Multiple benign nevi  Benign-appearing nevi present on exam today. Reassurance provided. Periodically examine moles and return to clinic if any moles change or become symptomatic (bleeding, itching, pain, etc).    ST (skin tag)  Reassurance given to patient. No treatment is necessary.   Treatment of benign, asymptomatic lesions may be considered cosmetic.    Cherry angioma  This is a benign vascular lesion. Reassurance given. No treatment required. Treatment of benign,  asymptomatic lesions may be considered cosmetic.     Patient instructed in importance of daily broad spectrum sunscreen use with spf at least 30. Sun avoidance and topical protection/protective clothing discussed.    Follow up if symptoms worsen or fail to improve.

## 2023-03-16 ENCOUNTER — PATIENT MESSAGE (OUTPATIENT)
Dept: DERMATOLOGY | Facility: CLINIC | Age: 38
End: 2023-03-16
Payer: OTHER GOVERNMENT

## 2023-03-17 LAB — BILE AC SERPL-SCNC: 2 MCMOL/L

## 2023-03-28 ENCOUNTER — PATIENT MESSAGE (OUTPATIENT)
Dept: FAMILY MEDICINE | Facility: CLINIC | Age: 38
End: 2023-03-28

## 2023-03-28 ENCOUNTER — OFFICE VISIT (OUTPATIENT)
Dept: FAMILY MEDICINE | Facility: CLINIC | Age: 38
End: 2023-03-28
Payer: OTHER GOVERNMENT

## 2023-03-28 DIAGNOSIS — J03.90 TONSILLITIS WITH EXUDATE: Primary | ICD-10-CM

## 2023-03-28 LAB
COMMENT: NORMAL
FINAL PATHOLOGIC DIAGNOSIS: NORMAL
GROSS: NORMAL
Lab: NORMAL
MICROSCOPIC EXAM: NORMAL

## 2023-03-28 PROCEDURE — 99214 OFFICE O/P EST MOD 30 MIN: CPT | Mod: 95,,, | Performed by: FAMILY MEDICINE

## 2023-03-28 PROCEDURE — 99214 PR OFFICE/OUTPT VISIT, EST, LEVL IV, 30-39 MIN: ICD-10-PCS | Mod: 95,,, | Performed by: FAMILY MEDICINE

## 2023-03-28 RX ORDER — PENICILLIN V POTASSIUM 500 MG/1
500 TABLET, FILM COATED ORAL 2 TIMES DAILY
Qty: 20 TABLET | Refills: 0 | Status: SHIPPED | OUTPATIENT
Start: 2023-03-28 | End: 2023-04-11

## 2023-03-28 NOTE — PROGRESS NOTES
The patient location is: home  The chief complaint leading to consultation is: tonsil swelling    Visit type: Virtual visit with synchronous audio and video  Total time spent with patient: 16 minutes  Each patient to whom he or she provides medical services by telemedicine is:  (1) informed of the relationship between the physician and patient and the respective role of any other health care provider with respect to management of the patient; and (2) notified that he or she may decline to receive medical services by telemedicine and may withdraw from such care at any time.         Office Visit    Patient Name: Shanae Vo    : 1985  MRN: [id number]      Assessment/Plan:  Shanae Vo is a 37 y.o. female who presents today for :    Tonsillitis  -     penicillin v potassium (VEETID) 500 MG tablet; Take 1 tablet (500 mg total) by mouth 2 (two) times daily.  Dispense: 20 tablet; Refill: 0  -     POCT Rapid Strep A  -improving, advised pt on natural progression of condition with reassurance provided. Start antibiotic and monitor for continued improvement. Advised frequent hand washing, rest, and plenty of fluids. Tylenol as needed for pain- may use honey/cough drops/Cepacol as needed for throat pain.        Follow up for worsening Sx. Urgent care/ED precautions provided.    This note was created by combination of typed  and MModal dictation.  Transcription errors may be present.  If there are any questions, please contact me.      ----------------------------------------------------------------------------------------------------------------------      HPI:  Patient Care Team:  [place] MD as PCP - General (Family Medicine)    Shanae is a 37 y.o. female with      Patient Active Problem List   Diagnosis    Endometrioma of ovary    Itching    Hypothyroidism due to Hashimoto's thyroiditis - controlled on Levothyroxine - follows Dr. 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 She states that she has been taking Advil with some improvement and decreased swelling of her R tonsil, but it is still red, and she can see some streaks of pus. She initially had sore throat and slightly difficulty swelling but her condition has improved. She denies any sick contacts, no fever/chills/N/V/malaise. She is otherwise eating as normal.    Answers submitted by the patient for this visit:  Sore Throat Questionnaire (Submitted on 3/27/2023)  Chief Complaint: Sore throat  Chronicity: new  Onset: in the past 7 days  Progression since onset: unchanged  Pain worse on: right  Fever: no fever  Pain - numeric: 5/10  abdominal pain: No  congestion: Yes  cough: No  diarrhea: No  drooling: No  ear discharge: No  ear pain: No  headaches: No  hoarse voice: No  neck pain: No  plugged ear sensation: Yes  shortness of breath: No  stridor: No  swollen glands: Yes  trouble swallowing: Yes  vomiting: No  strep: No  mono: No  Treatments tried: NSAIDs, gargles  Improvement on treatment: mild  Pain severity: mild              Patient Active Problem List   Diagnosis    Endometrioma of ovary    Itching    Hypothyroidism due to Hashimoto's thyroiditis - controlled on Levothyroxine - follows Dr. 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     Current Outpatient Medications on File Prior to Visit   Medication Sig Dispense  Refill    ibuprofen (ADVIL ORAL) Take by mouth as needed.      levothyroxine (SYNTHROID) 75 MCG tablet TAKE 1 TABLET BY MOUTH EVERY DAY BEFORE BREAKFAST ON AN EMPTY STOMACH 90 tablet 3    loratadine (CLARITIN) 10 mg tablet Take 1 tablet (10 mg total) by mouth once daily. 60 tablet 2     Current Facility-Administered Medications on File Prior to Visit   Medication Dose Route Frequency Provider Last Rate Last Admin    triamcinolone acetonide injection 40 mg  40 mg Intramuscular 1 time in Clinic/HOD [place] MD               Past Surgical History:   Procedure Laterality Date    BIOPSY OF LIVER WITH ULTRASOUND GUIDANCE N/A 2021    Procedure: BIOPSY, LIVER, WITH US GUIDANCE;  Surgeon: Baldev Chino MD;  Location: [facility] CATH LAB;  Service: Radiology;  Laterality: N/A;     SECTION N/A 10/12/2020    Procedure:  SECTION;  Surgeon: Harmony Singh MD;  Location: [facility] L&D;  Service: OB/GYN;  Laterality: N/A;     SECTION      DIAGNOSTIC LAPAROSCOPY N/A 10/28/2019    Procedure: LAPAROSCOPY, DIAGNOSTIC;  Surgeon: Luis Armando Vega MD;  Location: [facility] OR;  Service: OB/GYN;  Laterality: N/A;    LAPAROSCOPIC SURGICAL REMOVAL OF CYST OF OVARY Left 10/28/2019    Procedure: EXCISION, CYST, OVARY, LAPAROSCOPIC, LEFT;  Surgeon: Luis Armando Vega MD;  Location: [facility] OR;  Service: OB/GYN;  Laterality: Left;    NEEDLE LOCALIZATION N/A 2021    Procedure: NEEDLE LOCALIZATION;  Surgeon: Baldev Chino MD;  Location: [facility] CATH LAB;  Service: Radiology;  Laterality: N/A;    WISDOM TOOTH EXTRACTION         Family History   Problem Relation Age of Onset    Colon cancer Paternal Uncle     Cataracts Mother     No Known Problems Father     No Known Problems Sister     No Known Problems Brother     No Known Problems Maternal Aunt     No Known Problems Maternal Uncle     No Known Problems Paternal Aunt     No Known Problems Maternal Grandmother     No Known Problems Maternal Grandfather     No  Known Problems Paternal Grandmother     No Known Problems Paternal Grandfather     Breast cancer Neg Hx     Ovarian cancer Neg Hx        Social History     Socioeconomic History    Marital status:    Tobacco Use    Smoking status: Never    Smokeless tobacco: Never   Substance and Sexual Activity    Alcohol use: Not Currently     Comment: social    Drug use: Never    Sexual activity: Yes     Partners: Male     Birth control/protection: None             Allergies   Review of patient's allergies indicates:   Allergen Reactions    Shrimp Nausea And Vomiting             Review of Systems  See HPI        Physical Exam  There were no vitals taken for this visit.    GEN: NAD

## 2023-03-28 NOTE — TELEPHONE ENCOUNTER
Please advise,    Hey doctor Trini   I forgot to ask, do I have strep throat with those pus pockets? Or not yet. Thanks

## 2023-04-10 ENCOUNTER — PATIENT MESSAGE (OUTPATIENT)
Dept: FAMILY MEDICINE | Facility: CLINIC | Age: 38
End: 2023-04-10
Payer: OTHER GOVERNMENT

## 2023-04-10 DIAGNOSIS — J02.9 SORE THROAT: Primary | ICD-10-CM

## 2023-04-11 ENCOUNTER — OFFICE VISIT (OUTPATIENT)
Dept: FAMILY MEDICINE | Facility: CLINIC | Age: 38
End: 2023-04-11
Payer: OTHER GOVERNMENT

## 2023-04-11 VITALS
HEART RATE: 76 BPM | HEIGHT: 61 IN | WEIGHT: 175.94 LBS | SYSTOLIC BLOOD PRESSURE: 118 MMHG | TEMPERATURE: 99 F | BODY MASS INDEX: 33.22 KG/M2 | DIASTOLIC BLOOD PRESSURE: 68 MMHG | OXYGEN SATURATION: 100 %

## 2023-04-11 DIAGNOSIS — J02.9 SORE THROAT: Primary | ICD-10-CM

## 2023-04-11 DIAGNOSIS — J30.89 NON-SEASONAL ALLERGIC RHINITIS DUE TO OTHER ALLERGIC TRIGGER: ICD-10-CM

## 2023-04-11 LAB
CTP QC/QA: YES
S PYO RRNA THROAT QL PROBE: NEGATIVE

## 2023-04-11 PROCEDURE — 99999 PR PBB SHADOW E&M-EST. PATIENT-LVL III: ICD-10-PCS | Mod: PBBFAC,,, | Performed by: FAMILY MEDICINE

## 2023-04-11 PROCEDURE — 87880 STREP A ASSAY W/OPTIC: CPT | Mod: PBBFAC,PO | Performed by: FAMILY MEDICINE

## 2023-04-11 PROCEDURE — 99214 PR OFFICE/OUTPT VISIT, EST, LEVL IV, 30-39 MIN: ICD-10-PCS | Mod: S$PBB,,, | Performed by: FAMILY MEDICINE

## 2023-04-11 PROCEDURE — 87081 CULTURE SCREEN ONLY: CPT | Performed by: FAMILY MEDICINE

## 2023-04-11 PROCEDURE — 99999 PR PBB SHADOW E&M-EST. PATIENT-LVL III: CPT | Mod: PBBFAC,,, | Performed by: FAMILY MEDICINE

## 2023-04-11 PROCEDURE — 99213 OFFICE O/P EST LOW 20 MIN: CPT | Mod: PBBFAC,PO | Performed by: FAMILY MEDICINE

## 2023-04-11 PROCEDURE — 99214 OFFICE O/P EST MOD 30 MIN: CPT | Mod: S$PBB,,, | Performed by: FAMILY MEDICINE

## 2023-04-11 RX ORDER — LORATADINE 10 MG/1
10 TABLET ORAL DAILY
Qty: 60 TABLET | Refills: 2 | Status: SHIPPED | OUTPATIENT
Start: 2023-04-11

## 2023-04-11 RX ORDER — FLUTICASONE PROPIONATE 50 MCG
1 SPRAY, SUSPENSION (ML) NASAL 2 TIMES DAILY PRN
Qty: 16 ML | Refills: 1 | Status: SHIPPED | OUTPATIENT
Start: 2023-04-11

## 2023-04-11 NOTE — PROGRESS NOTES
"  Physical Exam  /68   Pulse 76   Temp 98.5 °F (36.9 °C) (Oral)   Ht 5' 1" (1.549 m)   Wt 79.8 kg (175 lb 14.8 oz)   LMP 2023   SpO2 100%   BMI 33.24 kg/m²      Office Visit    Patient Name: Shanae Vo    : 1985  MRN: [id number]      Assessment/Plan:  Shanae Vo is a 37 y.o. female who presents today for :    Sore throat  -     NEG POCT Rapid Strep A  -     CULTURE, STREP A,  THROAT  -     benzocaine-menthoL 15-3.6 mg Lozg; 1 each by Mucous Membrane route every 4 to 6 hours as needed (sore throat).  Dispense: 18 lozenge; Refill: 5  -Reassurance provided and advised supportive care. F/u throat culture    Non-seasonal allergic rhinitis due to other allergic trigger  -     loratadine (CLARITIN) 10 mg tablet; Take 1 tablet (10 mg total) by mouth once daily.  Dispense: 60 tablet; Refill: 2  -     fluticasone propionate (FLONASE) 50 mcg/actuation nasal spray; 1 spray (50 mcg total) by Each Nostril route 2 (two) times daily as needed for Rhinitis.  Dispense: 16 mL; Refill: 1  -anti-histamine, Flonase PRN daily  -discussed use of air humidifier/filter at home, changing AC filters regularly, avoid second-hand smoking.   -supportive therapy and symptomatic management.   -f/u as needed             Follow up for worsening Sx. Urgent care/ED precautions provided.        This note was created by combination of typed  and MModal dictation.  Transcription errors may be present.  If there are any questions, please contact me.      ----------------------------------------------------------------------------------------------------------------------      HPI:  Patient Care Team:  New Feliz MD as PCP - General (Family Medicine)    Shanae is a 37 y.o. female with      Patient Active Problem List   Diagnosis    Endometrioma of ovary    Itching    Hypothyroidism due to Hashimoto's thyroiditis - controlled on Levothyroxine - follows Dr. 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 She states that her son had come home from  with URI Sx last week, for which patient again started to have a sore throat 6 days ago and wants to be tested for strep as she is concerned that she may have a reinfection. She denies any fever/chills/ear pain/coughing. She does have moderate throat drainage and some sinus congestion. No malaise/coughing/SOB/body aches/loss of taste nor smell.        Additional ROS    No dysphagia  No CP/palpitations/swelling  No abd pain/no diarrhea  No rashes      Patient Active Problem List   Diagnosis    Endometrioma of ovary    Itching    Hypothyroidism due to Hashimoto's thyroiditis - controlled on Levothyroxine - follows Dr. 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       Current Outpatient Medications:     levothyroxine (SYNTHROID) 75 MCG tablet, TAKE 1 TABLET BY MOUTH EVERY DAY BEFORE BREAKFAST ON AN EMPTY STOMACH, Disp: 90 tablet, Rfl: 3    benzocaine-menthoL 15-3.6 mg Lozg, 1 each by Mucous Membrane route every 4 to 6 hours as needed (sore throat)., Disp: 18 lozenge, Rfl: 5    fluticasone propionate (FLONASE) 50 mcg/actuation nasal spray, 1 spray (50 mcg total) by Each Nostril route 2 (two) times daily as needed for Rhinitis.,  Disp: 16 mL, Rfl: 1    loratadine (CLARITIN) 10 mg tablet, Take 1 tablet (10 mg total) by mouth once daily., Disp: 60 tablet, Rfl: 2    Current Facility-Administered Medications:     triamcinolone acetonide injection 40 mg, 40 mg, Intramuscular, 1 time in Clinic/HOD, New Feliz MD    Past Surgical History:   Procedure Laterality Date    BIOPSY OF LIVER WITH ULTRASOUND GUIDANCE N/A 2021    Procedure: BIOPSY, LIVER, WITH US GUIDANCE;  Surgeon: Baldev Chino MD;  Location: [facility] CATH LAB;  Service: Radiology;  Laterality: N/A;     SECTION N/A 10/12/2020    Procedure:  SECTION;  Surgeon: Harmony Singh MD;  Location: [facility] L&D;  Service: OB/GYN;  Laterality: N/A;     SECTION      DIAGNOSTIC LAPAROSCOPY N/A 10/28/2019    Procedure: LAPAROSCOPY, DIAGNOSTIC;  Surgeon: Luis Armando Vega MD;  Location: [facility] OR;  Service: OB/GYN;  Laterality: N/A;    LAPAROSCOPIC SURGICAL REMOVAL OF CYST OF OVARY Left 10/28/2019    Procedure: EXCISION, CYST, OVARY, LAPAROSCOPIC, LEFT;  Surgeon: Luis Armando Vega MD;  Location: [facility] OR;  Service: OB/GYN;  Laterality: Left;    NEEDLE LOCALIZATION N/A 2021    Procedure: NEEDLE LOCALIZATION;  Surgeon: Baldev Chino MD;  Location: [facility] CATH LAB;  Service: Radiology;  Laterality: N/A;    WISDOM TOOTH EXTRACTION         Family History   Problem Relation Age of Onset    Colon cancer Paternal Uncle     Cataracts Mother     No Known Problems Father     No Known Problems Sister     No Known Problems Brother     No Known Problems Maternal Aunt     No Known Problems Maternal Uncle     No Known Problems Paternal Aunt     No Known Problems Maternal Grandmother     No Known Problems Maternal Grandfather     No Known Problems Paternal Grandmother     No Known Problems Paternal Grandfather     Breast cancer Neg Hx     Ovarian cancer Neg Hx        Social History     Socioeconomic History    Marital status:    Tobacco Use    Smoking status: Never     "Smokeless tobacco: Never   Substance and Sexual Activity    Alcohol use: Not Currently     Comment: social    Drug use: Never    Sexual activity: Yes     Partners: Male     Birth control/protection: None             Allergies   Review of patient's allergies indicates:   Allergen Reactions    Shrimp Nausea And Vomiting             Review of Systems  See HPI      [unfilled]  /68   Pulse 76   Temp 98.5 °F (36.9 °C) (Oral)   Ht 5' 1" (1.549 m)   Wt 79.8 kg (175 lb 14.8 oz)   LMP 03/25/2023   SpO2 100%   BMI 33.24 kg/m²     GEN: NAD, well developed, pleasant, well nourished  HEENT: NCAT, PERRLA, EOMI, sclera clear, anicteric, TM clear bilaterally, O/P with copious PND but otherwise normal - no tonsillar swelling/exudate/erythema, MMM with +mild cobblestoning, +boggy nasal mucosa with clear nasal discharge, no maxillary/frontal TTP   NECK: normal, supple with midline trachea, no LAD, no thyromegaly  LUNGS: CTAB, no w/r/r, no respiratory distress, no increased work of breathing  HEART: RRR, normal S1 and S2, no m/r/g, no palpitations    ABD: s/nt/nd, NABS, no organomegaly  SKIN: normal turgor, no rashes, no other lesions.   "

## 2023-04-14 LAB — BACTERIA THROAT CULT: NORMAL

## 2023-04-21 ENCOUNTER — PROCEDURE VISIT (OUTPATIENT)
Dept: DERMATOLOGY | Facility: CLINIC | Age: 38
End: 2023-04-21
Payer: OTHER GOVERNMENT

## 2023-04-21 DIAGNOSIS — D22.61: Primary | ICD-10-CM

## 2023-04-21 PROCEDURE — 11402 EXC TR-EXT B9+MARG 1.1-2 CM: CPT | Mod: PBBFAC | Performed by: DERMATOLOGY

## 2023-04-21 PROCEDURE — 99499 NO LOS: ICD-10-PCS | Mod: S$PBB,,, | Performed by: DERMATOLOGY

## 2023-04-21 PROCEDURE — 11402 PR EXC SKIN BENIG 1.1-2 CM TRUNK,ARM,LEG: ICD-10-PCS | Mod: S$PBB,,, | Performed by: DERMATOLOGY

## 2023-04-21 PROCEDURE — 88305 TISSUE EXAM BY PATHOLOGIST: CPT | Mod: 26,,, | Performed by: DERMATOLOGY

## 2023-04-21 PROCEDURE — 88305 TISSUE EXAM BY PATHOLOGIST: CPT | Performed by: DERMATOLOGY

## 2023-04-21 PROCEDURE — 99499 UNLISTED E&M SERVICE: CPT | Mod: S$PBB,,, | Performed by: DERMATOLOGY

## 2023-04-21 PROCEDURE — 88305 TISSUE EXAM BY PATHOLOGIST: ICD-10-PCS | Mod: 26,,, | Performed by: DERMATOLOGY

## 2023-04-21 PROCEDURE — 12032 INTMD RPR S/A/T/EXT 2.6-7.5: CPT | Mod: S$PBB,51,, | Performed by: DERMATOLOGY

## 2023-04-21 PROCEDURE — 11402 EXC TR-EXT B9+MARG 1.1-2 CM: CPT | Mod: S$PBB,,, | Performed by: DERMATOLOGY

## 2023-04-21 PROCEDURE — 12032 INTMD RPR S/A/T/EXT 2.6-7.5: CPT | Mod: PBBFAC,51 | Performed by: DERMATOLOGY

## 2023-04-21 PROCEDURE — 12032 PR LAYR CLOS WND TRUNK,ARM,LEG 2.6-7.5 CM: ICD-10-PCS | Mod: S$PBB,51,, | Performed by: DERMATOLOGY

## 2023-04-21 NOTE — PATIENT INSTRUCTIONS
Post- Operative Wound Care    Your doctor has performed local skin surgery today.  Vaseline ointment and a pressure bandage were placed after the surgery.  It is very important that you keep this bandage in place for 24 hours.  This will decrease the risk of post - operative infection and bleeding.  After 24 hours, you may remove the band aid and wash the area with warm soap and water and apply Vaseline ointment.  Many patients prefer to use Neosporin or Bacitracin ointment.  This is acceptable; however know that you can develop an allergy to this medication even if you have used it safely for years.  It is important to keep the area moist.  Letting it dry out and get air slows healing time, will worsen the scar, and make it more difficult to remove the stitches.  Band aid is optional after first 24 hours.    It is best NOT to use hydrogen peroxide or antibacterial soap to wash the operative site.       If you notice increasing redness, tenderness, pain, or yellow drainage at the biopsy or surgical site, please notify your doctor.  These are signs of an infection.    If your biopsy/surgical site is bleeding, apply firm pressure for 15 minutes straight.  Repeat for another 15 minutes, if it is still bleeding.   If the surgical site continues to bleed, then please contact your doctor.      For [facility] users:   You will receive your pathology results in [facility] as soon as they are available. Please be assured that your physician/provider will review your results and contact you should additional treatment be required. This is one more way [facility] is putting you first.       [facility] [facility] [place] [address]/ [phone] FAX/ [email or web address]

## 2023-04-21 NOTE — PROGRESS NOTES
PROCEDURE: Elliptical excision with intermediate layered repair in order to decrease dead space and decrease tension.    ANESTHETIC: 5 cc 1% Xylocaine with Epinephrine 1:100,000, buffered    SURGEON: Meena Still M.D.    ASSISTANTS: Steven Gibbs MA    PREOPERATIVE DIAGNOSIS:   Spitz nevus, pagetoid type    POSTOPERATIVE DIAGNOSIS:  Same as preoperative diagnosis    PATHOLOGIC DIAGNOSIS: Pending    LOCATION: R dorsal wrist    INITIAL LESION SIZE: 0.5 cm    EXCISED DIAMETER: 1.1 cm    PREPARATION: The diagnosis, procedure, alternatives, benefits and risks, including but not limited to: infection, bleeding/bruising, drug reactions, pain, scar or cosmetic defect, local sensation disturbances, wound dehiscence (separation of wound edges after sutures removed) and/or recurrence of present condition were explained to the patient. The patient elected to proceed.  Patient's identity was verified using 2 patient identifiers and the side and site was verified.  Time out period with surgeon, assistant and patient in surgical suite was taken.    PROCEDURE: The location noted above was prepped, draped, and anesthetized in the usual sterile fashion per  Meena Still . Lesional tissue was carefully marked with at least 3 mm margins of clinically normal skin in all directions. A fusiform elliptical excision was done with #15 blade carried down completely through the dermis into the deep subcutaneous tissues to the level of the non-muscle fascia, and dissection was carried out in that plane.  Electrocoagulation was used to obtain hemostasis. Blood loss was minimal. The wound was then approximated in a layered fashion with subcutaneous and intradermal sutures of 4.0 Monocryl, approximately 4 in number, and the wound was then superficially closed with simple interrupted sutures of 4.0 Prolene.    The patient tolerated the procedure well.    The area was cleaned and dressed appropriately and the patient was given wound care  instructions, as well as an appointment for follow-up evaluation.    LENGTH OF REPAIR: 3.2 cm

## 2023-04-25 ENCOUNTER — PATIENT MESSAGE (OUTPATIENT)
Dept: DERMATOLOGY | Facility: CLINIC | Age: 38
End: 2023-04-25
Payer: OTHER GOVERNMENT

## 2023-04-27 LAB
FINAL PATHOLOGIC DIAGNOSIS: NORMAL
GROSS: NORMAL
Lab: NORMAL
MICROSCOPIC EXAM: NORMAL

## 2023-05-05 ENCOUNTER — CLINICAL SUPPORT (OUTPATIENT)
Dept: DERMATOLOGY | Facility: CLINIC | Age: 38
End: 2023-05-05
Payer: OTHER GOVERNMENT

## 2023-05-05 DIAGNOSIS — Z48.02 ENCOUNTER FOR REMOVAL OF SUTURES: Primary | ICD-10-CM

## 2023-05-05 PROCEDURE — 99211 OFF/OP EST MAY X REQ PHY/QHP: CPT | Mod: PBBFAC

## 2023-05-05 PROCEDURE — 99024 PR POST-OP FOLLOW-UP VISIT: ICD-10-PCS | Mod: ,,, | Performed by: DERMATOLOGY

## 2023-05-05 PROCEDURE — 99024 POSTOP FOLLOW-UP VISIT: CPT | Mod: ,,, | Performed by: DERMATOLOGY

## 2023-05-05 PROCEDURE — 99999 PR PBB SHADOW E&M-EST. PATIENT-LVL I: ICD-10-PCS | Mod: PBBFAC,,,

## 2023-05-05 PROCEDURE — 99999 PR PBB SHADOW E&M-EST. PATIENT-LVL I: CPT | Mod: PBBFAC,,,

## 2023-05-05 NOTE — PROGRESS NOTES
Suture Removal note:  CC: 37 y.o. female patient is here for suture removal.         HPI: Patient is s/p excision of SCAR (POST-SURGICAL)  from the R dorsal wrist on 4/21/2023.  Patient reports no problems.    WOUND PE:  Sutures intact.  Wound healing well.  Good approximation of skin edges.  No signs or symptoms of infection.    IMPRESSION:  Sections show skin with a post-surgical scar. No elements of the original lesion are identified - margins clear.    PLAN:  Sutures removed today.  Continue wound care.    RTC: In 0 months.

## 2023-05-11 ENCOUNTER — TELEPHONE (OUTPATIENT)
Dept: HEPATOLOGY | Facility: CLINIC | Age: 38
End: 2023-05-11
Payer: OTHER GOVERNMENT

## 2023-05-11 ENCOUNTER — TELEPHONE (OUTPATIENT)
Dept: HEPATOLOGY | Facility: CLINIC | Age: 38
End: 2023-05-11

## 2023-05-11 ENCOUNTER — OFFICE VISIT (OUTPATIENT)
Dept: HEPATOLOGY | Facility: CLINIC | Age: 38
End: 2023-05-11
Payer: OTHER GOVERNMENT

## 2023-05-11 DIAGNOSIS — R85.7: ICD-10-CM

## 2023-05-11 DIAGNOSIS — O26.643 CHOLESTASIS DURING PREGNANCY IN THIRD TRIMESTER: Primary | ICD-10-CM

## 2023-05-11 DIAGNOSIS — R74.8 ELEVATED LIVER ENZYMES: ICD-10-CM

## 2023-05-11 DIAGNOSIS — K76.89 LIVER DYSFUNCTION: ICD-10-CM

## 2023-05-11 PROCEDURE — 99213 OFFICE O/P EST LOW 20 MIN: CPT | Mod: 95,,, | Performed by: INTERNAL MEDICINE

## 2023-05-11 PROCEDURE — 99213 PR OFFICE/OUTPT VISIT, EST, LEVL III, 20-29 MIN: ICD-10-PCS | Mod: 95,,, | Performed by: INTERNAL MEDICINE

## 2023-05-11 NOTE — TELEPHONE ENCOUNTER
A 6 month recall was entered today for Jasmin and to have labs as well. She is moving to [place]. So she will give a fax number before then to have orders sent there.

## 2023-05-11 NOTE — PROGRESS NOTES
The patient location is: home  The chief complaint leading to consultation is: f/u of liver biopsy    Visit type: audiovisual    Face to Face time with patient: 20 minutes  30 minutes of total time spent on the encounter, which includes face to face time and non-face to face time preparing to see the patient (eg, review of tests), Obtaining and/or reviewing separately obtained history, Documenting clinical information in the electronic or other health record, Independently interpreting results (not separately reported) and communicating results to the patient/family/caregiver, or Care coordination (not separately reported).     Each patient to whom he or she provides medical services by telemedicine is:  (1) informed of the relationship between the physician and patient and the respective role of any other health care provider with respect to management of the patient; and (2) notified that he or she may decline to receive medical services by telemedicine and may withdraw from such care at any time.    Notes:  HEPATOLOGY FOLLOW UP    Referring Physician: New Feliz MD  Current Corresponding Physician: New Feliz MD    Shanae Vo is here for follow up of a hx of presumed intrahepatic cholestasis of pregnancy    HPI  This patient saw Dr Brizuela, Hepatology at 22 weeks gestation 8/6/20.  At that time she had pruritus that started at ~18 weeks. Bile acids were elevated, but subsequently fluctuated (see below) She also saw fetal maternal high risk OB at [facility]. No prior hx of itching, cholestasis or liver disease. No family hx of liver disease.    He ordered serologies/autoimmune markers, bile acids as well as liver ultrasound w/ doppler to r/o other possible causes of liver disease for the sake of thoroughness in work-up. His advice was: if bile acids remained high and itching persisted, his plan was to start her on ursodeoxycholic acid. She was not seen in f/u. Because bile acids fluctuated, it was  not clear that she truly had intrahepatic cholestasis of pregnancy and was therefore not initially started on joesph. Towards the end of her pregnancy, her bile acids anh and she was started on joesph with an improvement in her itching.. The itching resolved with delivery.    Labs 20: Tbil 0.2, ALT 42, AST 34, ALKP 152.   HAV IgM-, HBsAG-, HCV Ab-, ASMA-, AMA-  Bile acids 20: 9. Repeat bile acids 20: 4;.20: elevated at 43.7;  down to 26  Labs 10/10/21 (POD#1): Tbil 0.3, ALT 17, AST 33, ALKP 168    Abdomen US with doppler 20: normal    She was admitted to the antepartum service 20 for serial BP monitoring and subsequently delivered 10/9/20 (31w1d) by  due to preeclampsia, severe, cholestasis of pregnancy, NRFHT, groin abscess.     Interval History:   Shanae Vo was seen by video visit 3/3/21 for a new elevation in liver enzymes. After her delivery her pruritus/cholestasis resolved and it did not recur. Because she was feeling very fatigued, her liver enzymes and thryoid were checked. Her enzymes were up:  Labs 21: Tbil 0.4, , , ALKP 373    I recommended a full serologic w/u and an MRI/MRCP:  Labs 3/8/21: Tbil 0.4 ALT 69, AST 40, ALKP 262, bile acids normal at 2  Ceruloplasmin normal at 33, peth negative, PINEDA-, ASMA-, AMA-, IgG normal at 1034, alhpa 1 antitrypsin 114 with normal phenotype of MM  HCV RNA-, HBsAg-, HBcAb-, HBsAb-, HAV IgM-, HAV IgG-  Iron sat 13%, ferritin 21  Of note had seen a rheumatologist x 2 in the past. Had abnormal serologies for both lupus and RA but not diagnosed with either disease. Does have hashimoto's thyroiditis.    MRI/MRCP 3/9/21: Liver: Normal homogeneous background signal.  No focal lesions.  Hepatic and portal veins are patent; Biliary: Gallbladder is decompressed. No filling defects.  No intrahepatic or extrahepatic biliary dilatation; Pancreas: Unremarkable.  No pancreatic ductal dilatation.    She had a liver biopsy  6/18/21 that suggests she may have PFIC:       Genetic testing: negative; more extensive deferred    Most recent labs:  Labs 3/15/23: ALT 28, AST 26, ALKP 89, , bile acids 2  Labs 11/15/22: ALT 17, AST 18, ALKP 81, GGT 96, bile acids 3  Labs 7/2922: ALT 19, AST 19, ALKP 67, , bile acids 5    She continues to feel well with no abdo pain, N/V or pruritus. Fatigue has not recurred.    Outpatient Encounter Medications as of 5/11/2023   Medication Sig Dispense Refill    benzocaine-menthoL 15-3.6 mg Lozg 1 each by Mucous Membrane route every 4 to 6 hours as needed (sore throat). 18 lozenge 5    fluticasone propionate (FLONASE) 50 mcg/actuation nasal spray 1 spray (50 mcg total) by Each Nostril route 2 (two) times daily as needed for Rhinitis. 16 mL 1    levothyroxine (SYNTHROID) 75 MCG tablet TAKE 1 TABLET BY MOUTH EVERY DAY BEFORE BREAKFAST ON AN EMPTY STOMACH 90 tablet 3    loratadine (CLARITIN) 10 mg tablet Take 1 tablet (10 mg total) by mouth once daily. 60 tablet 2     Facility-Administered Encounter Medications as of 5/11/2023   Medication Dose Route Frequency Provider Last Rate Last Admin    triamcinolone acetonide injection 40 mg  40 mg Intramuscular 1 time in Clinic/HOD New Feliz MD         Review of patient's allergies indicates:   Allergen Reactions    Shrimp Nausea And Vomiting     Past Medical History:   Diagnosis Date    Elevated liver enzymes 3/3/2021    Finger infection     right finger- patient will go to urgent care and take care of it    Hypertension     Thyroid disease     hypothyroid       Review of Systems   Constitutional: Negative.    HENT: Negative.    Eyes: Negative.    Respiratory: Negative.    Cardiovascular: Negative.    Gastrointestinal: Negative.    Genitourinary: Negative.    Musculoskeletal: Negative.    Skin: Negative.    Neurological: Negative.    Psychiatric/Behavioral: Negative.        Physical Exam        Lab Results   Component Value Date    GLU 97 07/29/2022     GLU 97 07/29/2022    BUN 10 07/29/2022    BUN 10 07/29/2022    CREATININE 0.7 07/29/2022    CREATININE 0.7 07/29/2022    CALCIUM 9.7 07/29/2022    CALCIUM 9.7 07/29/2022     07/29/2022     07/29/2022    K 4.2 07/29/2022    K 4.2 07/29/2022     07/29/2022     07/29/2022    PROT 7.4 03/15/2023    CO2 28 07/29/2022    CO2 28 07/29/2022    ANIONGAP 7 (L) 07/29/2022    ANIONGAP 7 (L) 07/29/2022    WBC 8.02 07/29/2022    WBC 8.02 07/29/2022    RBC 4.95 07/29/2022    RBC 4.95 07/29/2022    HGB 14.6 07/29/2022    HGB 14.6 07/29/2022    HCT 42.9 07/29/2022    HCT 42.9 07/29/2022    MCV 87 07/29/2022    MCV 87 07/29/2022    MCH 29.5 07/29/2022    MCH 29.5 07/29/2022    MCHC 34.0 07/29/2022    MCHC 34.0 07/29/2022     Lab Results   Component Value Date    RDW 12.0 07/29/2022    RDW 12.0 07/29/2022     07/29/2022     07/29/2022    MPV 9.1 (L) 07/29/2022    MPV 9.1 (L) 07/29/2022    GRAN 5.1 07/29/2022    GRAN 63.8 07/29/2022    LYMPH 2.1 07/29/2022    LYMPH 25.9 07/29/2022    MONO 0.7 07/29/2022    MONO 8.9 07/29/2022    EOSINOPHIL 0.9 07/29/2022    BASOPHIL 0.4 07/29/2022    EOS 0.1 07/29/2022    BASO 0.03 07/29/2022    APTT 23.6 10/09/2020    GROUPTRH A NEG 10/10/2020    CHOL 218 (H) 07/29/2022    TRIG 115 07/29/2022    HDL 59 07/29/2022    CHOLHDL 27.1 07/29/2022    TOTALCHOLEST 3.7 07/29/2022    ALBUMIN 3.8 03/15/2023    BILIDIR 0.2 03/15/2023    AST 26 03/15/2023    ALT 28 03/15/2023    ALKPHOS 89 03/15/2023    MG 7.2 (HH) 10/09/2020    LABPROT 10.3 07/29/2022    INR 1.0 07/29/2022       Assessment and Plan:  Shanae Vo is a 37 y.o. female with elevated liver enzymes and a liver biopsy that shows marked reduction of BSEP expression. Importantly, there is no fibrosis. Genetic testing was negative. More detailed genetic testing has been deferred. There are reports of pregnancy unmasking genetic predisposition to IHCP such as in ABCB4 and ABCB11 which are implicated in progressive  familial intrahepatic cholestasis (PFIC) and benign recurrent intrahepatic cholestasis (BRIC).     Continue to recommend hepatic panel, GGT and bile acids be checked but will decrease frequency to every 6 months. Will check fibroscan in 1-2 years to confirm no fibrosis progression.    Return 6 months. Of note, pt moving to [place] and will do labs there. I will continue video visits with her at least initially since I hold a [place] license.

## 2023-05-11 NOTE — TELEPHONE ENCOUNTER
----- Message from Mayte Cho MD sent at 5/11/2023  2:17 PM CDT -----  Labs and videovist in 6 months. Pt will be in [place] by then. Will let us know where to fax orders

## 2023-05-17 ENCOUNTER — PATIENT MESSAGE (OUTPATIENT)
Dept: DERMATOLOGY | Facility: CLINIC | Age: 38
End: 2023-05-17
Payer: OTHER GOVERNMENT

## 2023-05-17 ENCOUNTER — OFFICE VISIT (OUTPATIENT)
Dept: OBSTETRICS AND GYNECOLOGY | Facility: CLINIC | Age: 38
End: 2023-05-17
Payer: OTHER GOVERNMENT

## 2023-05-17 VITALS
DIASTOLIC BLOOD PRESSURE: 78 MMHG | WEIGHT: 174.63 LBS | HEIGHT: 61 IN | BODY MASS INDEX: 32.97 KG/M2 | SYSTOLIC BLOOD PRESSURE: 102 MMHG

## 2023-05-17 DIAGNOSIS — L73.9 FOLLICULITIS: ICD-10-CM

## 2023-05-17 DIAGNOSIS — Z01.419 WELL WOMAN EXAM WITH ROUTINE GYNECOLOGICAL EXAM: Primary | ICD-10-CM

## 2023-05-17 PROCEDURE — 99999 PR PBB SHADOW E&M-EST. PATIENT-LVL III: CPT | Mod: PBBFAC,,, | Performed by: OBSTETRICS & GYNECOLOGY

## 2023-05-17 PROCEDURE — 99213 OFFICE O/P EST LOW 20 MIN: CPT | Mod: PBBFAC | Performed by: OBSTETRICS & GYNECOLOGY

## 2023-05-17 PROCEDURE — 99999 PR PBB SHADOW E&M-EST. PATIENT-LVL III: ICD-10-PCS | Mod: PBBFAC,,, | Performed by: OBSTETRICS & GYNECOLOGY

## 2023-05-17 PROCEDURE — 99395 PR PREVENTIVE VISIT,EST,18-39: ICD-10-PCS | Mod: S$PBB,,, | Performed by: OBSTETRICS & GYNECOLOGY

## 2023-05-17 PROCEDURE — 99395 PREV VISIT EST AGE 18-39: CPT | Mod: S$PBB,,, | Performed by: OBSTETRICS & GYNECOLOGY

## 2023-05-17 RX ORDER — MUPIROCIN 20 MG/G
OINTMENT TOPICAL 3 TIMES DAILY PRN
Qty: 15 G | Refills: 2 | Status: SHIPPED | OUTPATIENT
Start: 2023-05-17

## 2023-05-17 NOTE — PROGRESS NOTES
"[facility]  Ambulatory Clinic  Obstetrics & Gynecology    Visit Date:  2023    Chief Complaint:  Annual GYN exam    History of Present Illness:      Shanae Vo is a 37 y.o. , new pt to me, here for a gynecologic exam.      Menses are regular, mild cramping first 1-2 days on menstrual cycle.    Pt current method of family planning is vastectomy, and reports no problems with this method.      Last pap ~2021 was benign.    Pt is a non-smoker.    Pt denies abnormal vaginal bleeding, vaginal discharge, dysmenorrhea, dyspareunia, pelvic pain, bloating, early satiety, unintentional weight loss, breast mass/skin changes, incontinence, GI or urinary complaints.      Otherwise, the pt is in her usual state of health.    Past History:  Gynecologic history as noted above.    Review of Systems:      GENERAL:  No fever, fatigue, excessive weight gain or loss  HEENT:  No headaches, hearing changes, visual disturbance  RESPIRATORY:  No cough, shortness of breath  CARDIOVASCULAR:  No chest pain, heart palpitations, leg swelling  BREAST:  No lump, pain, nipple discharge, skin changes  GASTROINTESTINAL:  No nausea, vomiting, constipation, diarrhea, abd pain, rectal bleeding   GENITOURINARY:  See HPI  ENDOCRINE:  No heat or cold intolerance  HEMATOLOGIC:  No easy bruisability or bleeding   LYMPHATICS:  No enlarged nodes  MUSCULOSKELETAL:  No acute joint pain or swelling  SKIN:  No rash, lesions, jaundice  NEUROLOGIC:  No dizziness, weakness, syncope  PSYCHIATRIC:  No significant mood changes, homicidal/suicidal ideations, abuse    Physical Exam:     /78   Ht 5' 1" (1.549 m)   Wt 79.2 kg (174 lb 9.7 oz)   LMP 05/10/2023   BMI 32.99 kg/m²   Pulse 50's, Resp rate 14     GENERAL:  No acute distress, well-nourished  HEENT:  Atraumatic, anicteric, moist mucus membranes. Neck supple w/o masses.  BREAST:  Symmetric, nontender, no obvious masses, adenopathy, skin changes or nipple " discharge.  LUNGS:  Clear normal respiratory effort  HEART:  Regular rate and rhythm, no murmurs, gallops, or rubs  ABDOMEN:  Soft, non-tender, non-distended, normoactive bowel sounds, no obvious organomegaly  EXT:  Symmetric w/o cramping, claudication, or edema. +2 distal pulses.  SKIN:  No rashes or bruising  PSYCH:  Mood and affect appropriate  NEURO:  Grossly intact bilaterally    GENITOURINARY:    VULVAR:  Female external genitalia w/o obvious lesions. Female hair distribution. Normal urethral meatus. No gross lymphadenopathy.    VAGINA:  Normal vaginal mucosa. Good support. No obvious lesion. No discharge.  CERVIX:  No cervical motion tenderness, discharge, or obvious lesions.   UTERUS:  Small, non-tender, normal contour  ADNEXA:  No masses, non-tender    RECTAL:  Deferred. No obvious external lesions    Chaperone present for exam.    Assessment:     37 y.o. ,  with vasectomy:    Well woman gynecologic exam    Plan:    A gynecologic health assessment was performed with age appropriate counseling.    Cervical cancer screening - pap up to date.    Pt requesting refill for bactroban for folliculitis.  Risks, benefits, and alternatives to folliculitis discussed.  Hygiene advice.    Encourage healthy lifestyle modifications, monthly self breast exams, and f/u with PCP for health maintenance.    Return 1 year for gynecologic exam, or sooner as needed.  All questions answered, pt voiced understanding.        Gilmar Feliz MD

## 2023-05-21 ENCOUNTER — PATIENT MESSAGE (OUTPATIENT)
Dept: DERMATOLOGY | Facility: CLINIC | Age: 38
End: 2023-05-21
Payer: OTHER GOVERNMENT

## 2023-05-22 ENCOUNTER — TELEPHONE (OUTPATIENT)
Dept: DERMATOLOGY | Facility: CLINIC | Age: 38
End: 2023-05-22
Payer: OTHER GOVERNMENT

## 2023-06-09 ENCOUNTER — OFFICE VISIT (OUTPATIENT)
Dept: DERMATOLOGY | Facility: CLINIC | Age: 38
End: 2023-06-09
Payer: OTHER GOVERNMENT

## 2023-06-09 DIAGNOSIS — T81.30XA SPITTING SUTURE, INITIAL ENCOUNTER: ICD-10-CM

## 2023-06-09 DIAGNOSIS — L72.3 INFLAMED SEBACEOUS CYST: Primary | ICD-10-CM

## 2023-06-09 PROCEDURE — 99212 PR OFFICE/OUTPT VISIT, EST, LEVL II, 10-19 MIN: ICD-10-PCS | Mod: 25,S$PBB,, | Performed by: DERMATOLOGY

## 2023-06-09 PROCEDURE — 11900 INJECT SKIN LESIONS </W 7: CPT | Mod: S$PBB,,, | Performed by: DERMATOLOGY

## 2023-06-09 PROCEDURE — 11900 PR INJECTION INTO SKIN LESIONS, UP TO 7: ICD-10-PCS | Mod: S$PBB,,, | Performed by: DERMATOLOGY

## 2023-06-09 PROCEDURE — 99212 OFFICE O/P EST SF 10 MIN: CPT | Mod: PBBFAC | Performed by: DERMATOLOGY

## 2023-06-09 PROCEDURE — 99999 PR PBB SHADOW E&M-EST. PATIENT-LVL II: CPT | Mod: PBBFAC,,, | Performed by: DERMATOLOGY

## 2023-06-09 PROCEDURE — 99212 OFFICE O/P EST SF 10 MIN: CPT | Mod: 25,S$PBB,, | Performed by: DERMATOLOGY

## 2023-06-09 PROCEDURE — 11900 INJECT SKIN LESIONS </W 7: CPT | Mod: PBBFAC | Performed by: DERMATOLOGY

## 2023-06-09 PROCEDURE — 99999 PR PBB SHADOW E&M-EST. PATIENT-LVL II: ICD-10-PCS | Mod: PBBFAC,,, | Performed by: DERMATOLOGY

## 2023-06-09 NOTE — PROGRESS NOTES
Subjective:      Patient ID:  Shanae Vo is a 37 y.o. female who presents for   Chief Complaint   Patient presents with    Lesion     R-forearm and L-groin      Pt also has complaints of boils on the left groin.  The boils appeard 1 month ago and have been present constantly.  She has been using Mupirocin for the past three weeks with minimal to no relief. The area is tender to touch.  She notes having boils lanced in the past     Lesion - Initial  Affected locations: R-forearm.  Duration: 3 weeks  Signs / symptoms: rough and oozing (Pt reports pulling out a stitch)  Severity: mild  Timing: constant  Aggravated by: nothing  Treatments tried: Scar gel.  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    Review of Systems   Constitutional:  Negative for fever and chills.   Skin:  Positive for activity-related sunscreen use, recent sunburn and wears hat (sometimes). Negative for daily sunscreen use.   Hematologic/Lymphatic: Does not bruise/bleed easily.     Objective:   Physical Exam   Constitutional: She appears well-developed and well-nourished. No distress.   Neurological: She is alert and oriented to person, place, and time. She is not disoriented.   Psychiatric: She has a normal mood and affect.   Skin:   Areas Examined (abnormalities noted in diagram):   Genitals / Buttocks / Groin Inspection Performed  RUE Inspected          Diagram Legend     Erythematous scaling macule/papule c/w actinic keratosis       Vascular papule c/w angioma      Pigmented verrucoid papule/plaque c/w seborrheic keratosis      Yellow umbilicated papule c/w sebaceous hyperplasia      Irregularly shaped tan macule c/w lentigo     1-2 mm smooth white papules consistent with Milia      Movable subcutaneous cyst with punctum c/w epidermal inclusion cyst      Subcutaneous movable cyst c/w pilar cyst      Firm pink to brown papule c/w dermatofibroma      Pedunculated fleshy papule(s) c/w skin  tag(s)      Evenly pigmented macule c/w junctional nevus     Mildly variegated pigmented, slightly irregular-bordered macule c/w mildly atypical nevus      Flesh colored to evenly pigmented papule c/w intradermal nevus       Pink pearly papule/plaque c/w basal cell carcinoma      Erythematous hyperkeratotic cursted plaque c/w SCC      Surgical scar with no sign of skin cancer recurrence      Open and closed comedones      Inflammatory papules and pustules      Verrucoid papule consistent consistent with wart     Erythematous eczematous patches and plaques     Dystrophic onycholytic nail with subungual debris c/w onychomycosis     Umbilicated papule    Erythematous-base heme-crusted tan verrucoid plaque consistent with inflamed seborrheic keratosis     Erythematous Silvery Scaling Plaque c/w Psoriasis     See annotation      Assessment / Plan:        Inflamed sebaceous cyst  -     triamcinolone acetonide injection 5 mg  Intralesional Kenalog 5mg/cc (0.1 cc total) injected into 1 lesion on the groin today after obtaining verbal consent including risk of surrounding hypopigmentation. Patient tolerated procedure well.    Units: 1  NDC for Kenalog 10mg/cc:  8269-5371-39    Spitting suture, initial encounter  Reassurance given to patient. No treatment is necessary.    Rtc 6 months for skin check or sooner for any concerns

## 2023-06-18 NOTE — PATIENT INSTRUCTIONS

## 2023-06-29 ENCOUNTER — PATIENT MESSAGE (OUTPATIENT)
Dept: OBSTETRICS AND GYNECOLOGY | Facility: CLINIC | Age: 38
End: 2023-06-29
Payer: OTHER GOVERNMENT

## 2023-06-30 ENCOUNTER — OFFICE VISIT (OUTPATIENT)
Dept: OBSTETRICS AND GYNECOLOGY | Facility: CLINIC | Age: 38
End: 2023-06-30
Payer: OTHER GOVERNMENT

## 2023-06-30 VITALS — SYSTOLIC BLOOD PRESSURE: 120 MMHG | DIASTOLIC BLOOD PRESSURE: 90 MMHG

## 2023-06-30 DIAGNOSIS — N76.0 ACUTE VAGINITIS: Primary | ICD-10-CM

## 2023-06-30 PROCEDURE — 99999 PR PBB SHADOW E&M-EST. PATIENT-LVL II: CPT | Mod: PBBFAC,,,

## 2023-06-30 PROCEDURE — 99212 OFFICE O/P EST SF 10 MIN: CPT | Mod: PBBFAC

## 2023-06-30 PROCEDURE — 99999 PR PBB SHADOW E&M-EST. PATIENT-LVL II: ICD-10-PCS | Mod: PBBFAC,,,

## 2023-06-30 PROCEDURE — 99213 PR OFFICE/OUTPT VISIT, EST, LEVL III, 20-29 MIN: ICD-10-PCS | Mod: S$PBB,,,

## 2023-06-30 PROCEDURE — 99213 OFFICE O/P EST LOW 20 MIN: CPT | Mod: S$PBB,,,

## 2023-06-30 PROCEDURE — 81514 NFCT DS BV&VAGINITIS DNA ALG: CPT

## 2023-06-30 RX ORDER — FLUCONAZOLE 150 MG/1
150 TABLET ORAL DAILY
Qty: 2 TABLET | Refills: 0 | Status: SHIPPED | OUTPATIENT
Start: 2023-06-30 | End: 2023-07-01

## 2023-06-30 RX ORDER — METRONIDAZOLE 500 MG/1
500 TABLET ORAL 2 TIMES DAILY
Qty: 14 TABLET | Refills: 0 | Status: SHIPPED | OUTPATIENT
Start: 2023-06-30 | End: 2023-07-07

## 2023-06-30 NOTE — PROGRESS NOTES
Subjective:      Patient ID: Shanae Vo is a 37 y.o. female.    Chief Complaint:  Vaginal Discharge (Slight odor)      History of Present Illness  Vaginal Discharge  The patient's primary symptoms include vaginal discharge. The patient's pertinent negatives include no pelvic pain. Pertinent negatives include no abdominal pain, diarrhea, dysuria, flank pain, frequency, headaches, hematuria, nausea or urgency. There is no history of menorrhagia.   Ms VO is a 37y.o. female G 1 P 1 that presents with complaint of vaginal discharge, vaginal odor, and vaginal itching for 3 days. She reports white discharge.   Pt states she has been sweating a lot because she is in the process of moving to [place]. She started OTC Monistat last night and got some relief.     She reports h/o vaginitis. Denies nausea, vomiting, diarrhea, constipation, dysuria, dyspareunia, abdominal pain.     She would not like STD screening at this visit. No other concerns or complaints at this visit.     GYN & OB History  Patient's last menstrual period was 06/10/2023.   Date of Last Pap: 5/10/2021    OB History    Para Term  AB Living   1 1   1       SAB IAB Ectopic Multiple Live Births         0        # Outcome Date GA Lbr Judson/2nd Weight Sex Delivery Anes PTL Lv   1  10/09/20 31w1d   M CS-LTranv EPI         Review of Systems  Review of Systems   Constitutional:  Negative for activity change and appetite change.   Respiratory:  Negative for shortness of breath.    Cardiovascular:  Negative for chest pain.   Gastrointestinal:  Negative for abdominal pain, diarrhea and nausea.   Genitourinary:  Positive for vaginal discharge and vaginal odor. Negative for bladder incontinence, decreased libido, dysmenorrhea, dyspareunia, dysuria, flank pain, frequency, genital sores, hematuria, hot flashes, menorrhagia, menstrual problem, pelvic pain, urgency, vaginal bleeding, vaginal pain, urinary incontinence, postcoital bleeding,  postmenopausal bleeding and vaginal dryness.   Integumentary:  Negative for breast tenderness.   Neurological:  Negative for headaches.   Breast: Negative for breast self exam and tenderness       Objective:     Physical Exam:   Constitutional: She is oriented to person, place, and time. She appears well-developed and well-nourished.    HENT:   Head: Normocephalic.      Cardiovascular:       Exam reveals no clubbing.        Pulmonary/Chest: Effort normal and breath sounds normal. Right breast exhibits no nipple discharge, no skin change, no tenderness, no bleeding and no swelling. Left breast exhibits no nipple discharge, no skin change, no tenderness, no bleeding and no swelling. Breasts are symmetrical.        Abdominal: Soft. There is no abdominal tenderness. Hernia confirmed negative in the right inguinal area and confirmed negative in the left inguinal area.     Genitourinary:    Inguinal canal, uterus, right adnexa, left adnexa and rectum normal.   Rectum:      No tenderness.      Pelvic exam was performed with patient supine.   The external female genitalia was normal.   No external genitalia lesions identified,Genitalia hair distrobution normal .   Labial bartholins normal.Cervix is normal. There is vaginal discharge (copious white) in the vagina. No tenderness in the vagina.    No signs of injury in the vagina.   Vagina was moist.Cervix exhibits no discharge and no tenderness. Uterus is not tender. Normal urethral meatus.Urethra findings: no tendernessBladder findings: no bladder tenderness          Musculoskeletal: Normal range of motion and moves all extremeties.      Lymphadenopathy: No inguinal adenopathy noted on the right or left side.    Neurological: She is alert and oriented to person, place, and time.    Skin: Skin is warm. Nails show no clubbing.    Psychiatric: She has a normal mood and affect. Her behavior is normal. Judgment and thought content normal.       Assessment:     1. Acute vaginitis             Plan:     1. Acute vaginitis  - Vaginosis Screen by DNA Probe  - metroNIDAZOLE (FLAGYL) 500 MG tablet; Take 1 tablet (500 mg total) by mouth 2 (two) times daily. for 7 days  Dispense: 14 tablet; Refill: 0  - fluconazole (DIFLUCAN) 150 MG Tab; Take 1 tablet (150 mg total) by mouth once daily. for 1 day  Dispense: 2 tablet; Refill: 0       Impression: bacterial vaginosis and yeast vulvovaginitis, uncomplicated (fewer than four times a year)  Plan:   Prescriptions as noted in orders  Reviewed vulvar/vaginal care and hygiene  Return visit if symptoms persist or progress despite treatment    Vaginitis Prevention:  - Avoiding feminine products such as deoderant soaps, body wash, bubble bath, douches, scented toilet paper, deoderant tampons or pads, feminine wipes, chronic pad use, etc. If you wash with soap make sure its hypo allergic (free of added scents or colors)   - Avoiding other vulvovaginal irritants such as long hot baths, humidity, tight, synthetic clothing, chlorine and sitting around in wet bathing suits  - Wearing cotton underwear, avoiding thong underwear and no underwear to bed-wear loose cotton bottoms to bed   - Taking showers instead of baths and use a hair dryer on cool setting afterwards to dry  - Wearing cotton to exercise and shower immediately after exercise and change clothes  - Using polyurethane condoms without spermicide if sexually active and symptoms are triggered by intercourse  - Use laundry detergent without added perfumes or colors   - Do not have sexual intercourse until symptoms have gone away   - Increase your intake of probiotics--you can get these by eating yogurt/greek yogurt or by buying over the counter probiotic supplements     Probiotic Information:   Any probiotic you choose needs to have ALL of the following components (Each needs to be >10 colony forming units [CFUs]):   - L. Acidophilus  - L. Rhamnosus  - L. Fermentum       FOLLOW UP: PRN lack of improvement.

## 2023-08-28 ENCOUNTER — PATIENT MESSAGE (OUTPATIENT)
Dept: HEPATOLOGY | Facility: CLINIC | Age: 38
End: 2023-08-28
Payer: OTHER GOVERNMENT

## 2023-12-19 DIAGNOSIS — E03.8 HYPOTHYROIDISM DUE TO HASHIMOTO'S THYROIDITIS: ICD-10-CM

## 2023-12-19 DIAGNOSIS — E06.3 HYPOTHYROIDISM DUE TO HASHIMOTO'S THYROIDITIS: ICD-10-CM

## 2023-12-19 RX ORDER — LEVOTHYROXINE SODIUM 75 UG/1
TABLET ORAL
Qty: 90 TABLET | Refills: 0 | Status: SHIPPED | OUTPATIENT
Start: 2023-12-19

## 2023-12-19 NOTE — TELEPHONE ENCOUNTER
Entered as requested. Patient had requested last refill as she just moved to [place] and is setting up with new PCP to [facility] care      Hypothyroidism due to Hashimoto's thyroiditis  -     levothyroxine (SYNTHROID) 75 MCG tablet; TAKE 1 TABLET BY MOUTH EVERY DAY BEFORE BREAKFAST ON AN EMPTY STOMACH  Dispense: 90 tablet; Refill: 0

## 2024-01-24 ENCOUNTER — PATIENT MESSAGE (OUTPATIENT)
Dept: HEPATOLOGY | Facility: CLINIC | Age: 39
End: 2024-01-24
Payer: OTHER GOVERNMENT

## 2024-02-07 ENCOUNTER — PATIENT MESSAGE (OUTPATIENT)
Dept: HEPATOLOGY | Facility: CLINIC | Age: 39
End: 2024-02-07
Payer: OTHER GOVERNMENT

## 2024-02-08 ENCOUNTER — OFFICE VISIT (OUTPATIENT)
Dept: HEPATOLOGY | Facility: CLINIC | Age: 39
End: 2024-02-08
Payer: OTHER GOVERNMENT

## 2024-02-08 DIAGNOSIS — R74.8 ELEVATED LIVER ENZYMES: ICD-10-CM

## 2024-02-08 DIAGNOSIS — O26.643 CHOLESTASIS DURING PREGNANCY IN THIRD TRIMESTER: Primary | ICD-10-CM

## 2024-02-08 DIAGNOSIS — R85.7: ICD-10-CM

## 2024-02-08 PROCEDURE — 99213 OFFICE O/P EST LOW 20 MIN: CPT | Mod: 95,,, | Performed by: INTERNAL MEDICINE

## 2024-02-08 NOTE — PATIENT INSTRUCTIONS
Labs again in 6 months including an ALKP and GGT  MRI/MRCP if the ALKP goes up  Bile acids with each blood test  Return 6 months

## 2024-02-08 NOTE — PROGRESS NOTES
The patient location is: home in [place]  The chief complaint leading to consultation is: f/u of liver biopsy    Visit type: audiovisual    Face to Face time with patient: 20 minutes  30 minutes of total time spent on the encounter, which includes face to face time and non-face to face time preparing to see the patient (eg, review of tests), Obtaining and/or reviewing separately obtained history, Documenting clinical information in the electronic or other health record, Independently interpreting results (not separately reported) and communicating results to the patient/family/caregiver, or Care coordination (not separately reported).     Each patient to whom he or she provides medical services by telemedicine is:  (1) informed of the relationship between the physician and patient and the respective role of any other health care provider with respect to management of the patient; and (2) notified that he or she may decline to receive medical services by telemedicine and may withdraw from such care at any time.    Notes:  HEPATOLOGY FOLLOW UP    Referring Physician: New Feliz MD  Current Corresponding Physician: New Feliz MD    Shanae Vo is here for follow up of a hx of presumed intrahepatic cholestasis of pregnancy    HPI  This patient saw Dr Brizuela, Hepatology at 22 weeks gestation 8/6/20.  At that time she had pruritus that started at ~18 weeks. Bile acids were elevated, but subsequently fluctuated (see below) She also saw fetal maternal high risk OB at [facility]. No prior hx of itching, cholestasis or liver disease. No family hx of liver disease.    He ordered serologies/autoimmune markers, bile acids as well as liver ultrasound w/ doppler to r/o other possible causes of liver disease for the sake of thoroughness in work-up. His advice was: if bile acids remained high and itching persisted, his plan was to start her on ursodeoxycholic acid. She was not seen in f/u. Because bile acids  fluctuated, it was not clear that she truly had intrahepatic cholestasis of pregnancy and was therefore not initially started on joesph. Towards the end of her pregnancy, her bile acids anh and she was started on joesph with an improvement in her itching.. The itching resolved with delivery.    Labs 20: Tbil 0.2, ALT 42, AST 34, ALKP 152.   HAV IgM-, HBsAG-, HCV Ab-, ASMA-, AMA-  Bile acids 20: 9. Repeat bile acids 20: 4;.20: elevated at 43.7;  down to 26  Labs 10/10/21 (POD#1): Tbil 0.3, ALT 17, AST 33, ALKP 168    Abdomen US with doppler 20: normal    She was admitted to the antepartum service 20 for serial BP monitoring and subsequently delivered 10/9/20 (31w1d) by  due to preeclampsia, severe, cholestasis of pregnancy, NRFHT, groin abscess.     Interval History:   Shanae Vo was seen by video visit 3/3/21 for a new elevation in liver enzymes. After her delivery her pruritus/cholestasis resolved and it did not recur. Because she was feeling very fatigued, her liver enzymes and thryoid were checked. Her enzymes were up:  Labs 21: Tbil 0.4, , , ALKP 373    I recommended a full serologic w/u and an MRI/MRCP:  Labs 3/8/21: Tbil 0.4 ALT 69, AST 40, ALKP 262, bile acids normal at 2  Ceruloplasmin normal at 33, peth negative, PINEDA-, ASMA-, AMA-, IgG normal at 1034, alhpa 1 antitrypsin 114 with normal phenotype of MM  HCV RNA-, HBsAg-, HBcAb-, HBsAb-, HAV IgM-, HAV IgG-  Iron sat 13%, ferritin 21  Of note had seen a rheumatologist x 2 in the past. Had abnormal serologies for both lupus and RA but not diagnosed with either disease. Does have hashimoto's thyroiditis.    MRI/MRCP 3/9/21: Liver: Normal homogeneous background signal.  No focal lesions.  Hepatic and portal veins are patent; Biliary: Gallbladder is decompressed. No filling defects.  No intrahepatic or extrahepatic biliary dilatation; Pancreas: Unremarkable.  No pancreatic ductal dilatation.    She  had a liver biopsy 6/18/21 that suggests she may have PFIC:       Genetic testing: negative; more extensive deferred    Most recent labs:  Labs 3/15/23: ALT 28, AST 26, ALKP 89, , bile acids 2  Labs 11/15/22: ALT 17, AST 18, ALKP 81, GGT 96, bile acids 3  Labs 7/2922: ALT 19, AST 19, ALKP 67, , bile acids 5  Labs 1/26/24: ALT 22, AST 25, ALKP 79, Tbil 0.4, GGT 84  Bile acids 1.4  No recent imaging    --She continues to feel well with no abdo pain, N/V or pruritus. Fatigue has not recurred.  --had an abnormal mole removed by MOHs    Outpatient Encounter Medications as of 2/8/2024   Medication Sig Dispense Refill    benzocaine-menthoL 15-3.6 mg Lozg 1 each by Mucous Membrane route every 4 to 6 hours as needed (sore throat). 18 lozenge 5    fluticasone propionate (FLONASE) 50 mcg/actuation nasal spray 1 spray (50 mcg total) by Each Nostril route 2 (two) times daily as needed for Rhinitis. 16 mL 1    levothyroxine (SYNTHROID) 75 MCG tablet TAKE 1 TABLET BY MOUTH EVERY DAY BEFORE BREAKFAST ON AN EMPTY STOMACH 90 tablet 0    loratadine (CLARITIN) 10 mg tablet Take 1 tablet (10 mg total) by mouth once daily. 60 tablet 2    mupirocin (BACTROBAN) 2 % ointment Apply topically 3 (three) times daily as needed (folliculitis). 15 g 2     Facility-Administered Encounter Medications as of 2/8/2024   Medication Dose Route Frequency Provider Last Rate Last Admin    triamcinolone acetonide injection 40 mg  40 mg Intramuscular 1 time in Clinic/HOD New Feliz MD        triamcinolone acetonide injection 5 mg  5 mg Intradermal 1 time in Clinic/HOD Meena Still MD         Review of patient's allergies indicates:   Allergen Reactions    Shrimp Nausea And Vomiting     Past Medical History:   Diagnosis Date    Elevated liver enzymes 3/3/2021    Finger infection     right finger- patient will go to urgent care and take care of it    Hypertension     Thyroid disease     hypothyroid       Review of Systems    Constitutional: Negative.    HENT: Negative.    Eyes: Negative.    Respiratory: Negative.    Cardiovascular: Negative.    Gastrointestinal: Negative.    Genitourinary: Negative.    Musculoskeletal: Negative.    Skin: Negative.    Neurological: Negative.    Psychiatric/Behavioral: Negative.        Physical Exam        Lab Results   Component Value Date    GLU 97 07/29/2022    GLU 97 07/29/2022    BUN 10 07/29/2022    BUN 10 07/29/2022    CREATININE 0.7 07/29/2022    CREATININE 0.7 07/29/2022    CALCIUM 9.7 07/29/2022    CALCIUM 9.7 07/29/2022     07/29/2022     07/29/2022    K 4.2 07/29/2022    K 4.2 07/29/2022     07/29/2022     07/29/2022    PROT 7.4 03/15/2023    CO2 28 07/29/2022    CO2 28 07/29/2022    ANIONGAP 7 (L) 07/29/2022    ANIONGAP 7 (L) 07/29/2022    WBC 8.02 07/29/2022    WBC 8.02 07/29/2022    RBC 4.95 07/29/2022    RBC 4.95 07/29/2022    HGB 14.6 07/29/2022    HGB 14.6 07/29/2022    HCT 42.9 07/29/2022    HCT 42.9 07/29/2022    MCV 87 07/29/2022    MCV 87 07/29/2022    MCH 29.5 07/29/2022    MCH 29.5 07/29/2022    MCHC 34.0 07/29/2022    MCHC 34.0 07/29/2022     Lab Results   Component Value Date    RDW 12.0 07/29/2022    RDW 12.0 07/29/2022     07/29/2022     07/29/2022    MPV 9.1 (L) 07/29/2022    MPV 9.1 (L) 07/29/2022    GRAN 5.1 07/29/2022    GRAN 63.8 07/29/2022    LYMPH 2.1 07/29/2022    LYMPH 25.9 07/29/2022    MONO 0.7 07/29/2022    MONO 8.9 07/29/2022    EOSINOPHIL 0.9 07/29/2022    BASOPHIL 0.4 07/29/2022    EOS 0.1 07/29/2022    BASO 0.03 07/29/2022    APTT 23.6 10/09/2020    GROUPTRH A NEG 10/10/2020    CHOL 218 (H) 07/29/2022    TRIG 115 07/29/2022    HDL 59 07/29/2022    CHOLHDL 27.1 07/29/2022    TOTALCHOLEST 3.7 07/29/2022    ALBUMIN 3.8 03/15/2023    BILIDIR 0.2 03/15/2023    AST 26 03/15/2023    ALT 28 03/15/2023    ALKPHOS 89 03/15/2023    MG 7.2 (HH) 10/09/2020    LABPROT 10.3 07/29/2022    INR 1.0 07/29/2022       Assessment and  Plan:  Shanae Vo is a 38 y.o. 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 Importantly, there was NO fibrosis. Genetic testing was negative. More detailed genetic testing was deferred. There are reports of pregnancy unmasking genetic predisposition to IHCP such as in ABCB4 and ABCB11 which are implicated in progressive familial intrahepatic cholestasis (PFIC) and benign recurrent intrahepatic cholestasis (BRIC).     Continue to recommend hepatic panel, GGT and bile acids be checked every 6 months. Will check fibroscan in 1-2 years to confirm no fibrosis progression. Recommend an urgent MRI/MRCP if the AKP increases.    Return 6 months.

## 2024-02-09 ENCOUNTER — TELEPHONE (OUTPATIENT)
Dept: HEPATOLOGY | Facility: CLINIC | Age: 39
End: 2024-02-09
Payer: OTHER GOVERNMENT

## 2024-02-09 NOTE — TELEPHONE ENCOUNTER
----- Message from Mayte Cho MD sent at 2/8/2024  8:35 PM CST -----  Return 6 months. Will do local labs

## 2024-04-08 ENCOUNTER — PATIENT MESSAGE (OUTPATIENT)
Dept: HEPATOLOGY | Facility: CLINIC | Age: 39
End: 2024-04-08
Payer: OTHER GOVERNMENT

## 2024-09-10 DIAGNOSIS — Z12.31 OTHER SCREENING MAMMOGRAM: ICD-10-CM

## 2024-09-24 ENCOUNTER — OFFICE VISIT (OUTPATIENT)
Dept: HEPATOLOGY | Facility: CLINIC | Age: 39
End: 2024-09-24
Payer: OTHER GOVERNMENT

## 2024-09-24 DIAGNOSIS — R74.8 ELEVATED LIVER ENZYMES: Primary | ICD-10-CM

## 2024-09-24 PROCEDURE — 99213 OFFICE O/P EST LOW 20 MIN: CPT | Mod: 95,,, | Performed by: INTERNAL MEDICINE

## 2024-09-24 NOTE — PATIENT INSTRUCTIONS
Continue to recommend hepatic panel, GGT and bile acids be checked every 6 months. Will check fibroscan in 1-2 years to confirm no fibrosis progression. Recommend an urgent MRI/MRCP if the AKP increases.  F/u 6 months

## 2024-09-24 NOTE — PROGRESS NOTES
The patient location is: home in [place]  The chief complaint leading to consultation is: f/u of liver biopsy    Visit type: audiovisual    Face to Face time with patient: 20 minutes  30 minutes of total time spent on the encounter, which includes face to face time and non-face to face time preparing to see the patient (eg, review of tests), Obtaining and/or reviewing separately obtained history, Documenting clinical information in the electronic or other health record, Independently interpreting results (not separately reported) and communicating results to the patient/family/caregiver, or Care coordination (not separately reported).     Each patient to whom he or she provides medical services by telemedicine is:  (1) informed of the relationship between the physician and patient and the respective role of any other health care provider with respect to management of the patient; and (2) notified that he or she may decline to receive medical services by telemedicine and may withdraw from such care at any time.    Notes:  HEPATOLOGY FOLLOW UP    Referring Physician: New Feliz MD  Current Corresponding Physician: New Feliz MD    Shanae Vo is here for follow up of a hx of presumed intrahepatic cholestasis of pregnancy    HPI  This patient saw Dr Brizuela, Hepatology at 22 weeks gestation 8/6/20.  At that time she had pruritus that started at ~18 weeks. Bile acids were elevated, but subsequently fluctuated (see below) She also saw fetal maternal high risk OB at [facility]. No prior hx of itching, cholestasis or liver disease. No family hx of liver disease.    He ordered serologies/autoimmune markers, bile acids as well as liver ultrasound w/ doppler to r/o other possible causes of liver disease for the sake of thoroughness in work-up. His advice was: if bile acids remained high and itching persisted, his plan was to start her on ursodeoxycholic acid. She was not seen in f/u. Because bile acids  fluctuated, it was not clear that she truly had intrahepatic cholestasis of pregnancy and was therefore not initially started on joesph. Towards the end of her pregnancy, her bile acids anh and she was started on joesph with an improvement in her itching.. The itching resolved with delivery.    Labs 20: Tbil 0.2, ALT 42, AST 34, ALKP 152.   HAV IgM-, HBsAG-, HCV Ab-, ASMA-, AMA-  Bile acids 20: 9. Repeat bile acids 20: 4;.20: elevated at 43.7;  down to 26  Labs 10/10/21 (POD#1): Tbil 0.3, ALT 17, AST 33, ALKP 168    Abdomen US with doppler 20: normal    She was admitted to the antepartum service 20 for serial BP monitoring and subsequently delivered 10/9/20 (31w1d) by  due to preeclampsia, severe, cholestasis of pregnancy, NRFHT, groin abscess.     Interval History:   Shanae Vo was seen by video visit 3/3/21 for a new elevation in liver enzymes. After her delivery her pruritus/cholestasis resolved and it did not recur. Because she was feeling very fatigued, her liver enzymes and thryoid were checked. Her enzymes were up:  Labs 21: Tbil 0.4, , , ALKP 373    I recommended a full serologic w/u and an MRI/MRCP:  Labs 3/8/21: Tbil 0.4 ALT 69, AST 40, ALKP 262, bile acids normal at 2  Ceruloplasmin normal at 33, peth negative, PINEDA-, ASMA-, AMA-, IgG normal at 1034, alhpa 1 antitrypsin 114 with normal phenotype of MM  HCV RNA-, HBsAg-, HBcAb-, HBsAb-, HAV IgM-, HAV IgG-  Iron sat 13%, ferritin 21  Of note had seen a rheumatologist x 2 in the past. Had abnormal serologies for both lupus and RA but not diagnosed with either disease. Does have hashimoto's thyroiditis.    MRI/MRCP 3/9/21: Liver: Normal homogeneous background signal.  No focal lesions.  Hepatic and portal veins are patent; Biliary: Gallbladder is decompressed. No filling defects.  No intrahepatic or extrahepatic biliary dilatation; Pancreas: Unremarkable.  No pancreatic ductal dilatation.    She  had a liver biopsy 6/18/21 that suggests she may have PFIC:       Genetic testing: negative; more extensive deferred    Most recent labs:  Labs 3/15/23: ALT 28, AST 26, ALKP 89, , bile acids 2  Labs 11/15/22: ALT 17, AST 18, ALKP 81, GGT 96, bile acids 3  Labs 7/2922: ALT 19, AST 19, ALKP 67, , bile acids 5  Labs 1/26/24: ALT 22, AST 25, ALKP 79, Tbil 0.4, GGT 84  Bile acids 1.4  Labs 8/28/24: ALT 16, AST 15, ALKP 75, Tbil 0.3, no bile acids;  No recent imaging    --She continues to feel well with no abdo pain, N/V or pruritus. Fatigue has not recurred.  --had an abnormal mole removed by [facility]    Outpatient Encounter Medications as of 9/24/2024   Medication Sig Dispense Refill    levothyroxine (SYNTHROID) 75 MCG tablet TAKE 1 TABLET BY MOUTH EVERY DAY BEFORE BREAKFAST ON AN EMPTY STOMACH 90 tablet 0    benzocaine-menthoL 15-3.6 mg Lozg 1 each by Mucous Membrane route every 4 to 6 hours as needed (sore throat). 18 lozenge 5    fluticasone propionate (FLONASE) 50 mcg/actuation nasal spray 1 spray (50 mcg total) by Each Nostril route 2 (two) times daily as needed for Rhinitis. 16 mL 1    loratadine (CLARITIN) 10 mg tablet Take 1 tablet (10 mg total) by mouth once daily. 60 tablet 2    mupirocin (BACTROBAN) 2 % ointment Apply topically 3 (three) times daily as needed (folliculitis). 15 g 2     Facility-Administered Encounter Medications as of 9/24/2024   Medication Dose Route Frequency Provider Last Rate Last Admin    triamcinolone acetonide injection 40 mg  40 mg Intramuscular 1 time in Clinic/HOD New Feliz MD        triamcinolone acetonide injection 5 mg  5 mg Intradermal 1 time in Clinic/HOD Meena Still MD         Review of patient's allergies indicates:   Allergen Reactions    Shrimp Nausea And Vomiting     Past Medical History:   Diagnosis Date    Elevated liver enzymes 3/3/2021    Finger infection     right finger- patient will go to urgent care and take care of it    Hypertension      Thyroid disease     hypothyroid       Review of Systems   Constitutional: Negative.    HENT: Negative.    Eyes: Negative.    Respiratory: Negative.    Cardiovascular: Negative.    Gastrointestinal: Negative.    Genitourinary: Negative.    Musculoskeletal: Negative.    Skin: Negative.    Neurological: Negative.    Psychiatric/Behavioral: Negative.        Physical Exam        Lab Results   Component Value Date    GLU 97 07/29/2022    GLU 97 07/29/2022    BUN 10 07/29/2022    BUN 10 07/29/2022    CREATININE 0.7 07/29/2022    CREATININE 0.7 07/29/2022    CALCIUM 9.7 07/29/2022    CALCIUM 9.7 07/29/2022     07/29/2022     07/29/2022    K 4.2 07/29/2022    K 4.2 07/29/2022     07/29/2022     07/29/2022    PROT 7.4 03/15/2023    CO2 28 07/29/2022    CO2 28 07/29/2022    ANIONGAP 7 (L) 07/29/2022    ANIONGAP 7 (L) 07/29/2022    WBC 8.02 07/29/2022    WBC 8.02 07/29/2022    RBC 4.95 07/29/2022    RBC 4.95 07/29/2022    HGB 14.6 07/29/2022    HGB 14.6 07/29/2022    HCT 42.9 07/29/2022    HCT 42.9 07/29/2022    MCV 87 07/29/2022    MCV 87 07/29/2022    MCH 29.5 07/29/2022    MCH 29.5 07/29/2022    MCHC 34.0 07/29/2022    MCHC 34.0 07/29/2022     Lab Results   Component Value Date    RDW 12.0 07/29/2022    RDW 12.0 07/29/2022     07/29/2022     07/29/2022    MPV 9.1 (L) 07/29/2022    MPV 9.1 (L) 07/29/2022    GRAN 5.1 07/29/2022    GRAN 63.8 07/29/2022    LYMPH 2.1 07/29/2022    LYMPH 25.9 07/29/2022    MONO 0.7 07/29/2022    MONO 8.9 07/29/2022    EOSINOPHIL 0.9 07/29/2022    BASOPHIL 0.4 07/29/2022    EOS 0.1 07/29/2022    BASO 0.03 07/29/2022    APTT 23.6 10/09/2020    GROUPTRH A NEG 10/10/2020    CHOL 218 (H) 07/29/2022    TRIG 115 07/29/2022    HDL 59 07/29/2022    CHOLHDL 27.1 07/29/2022    TOTALCHOLEST 3.7 07/29/2022    ALBUMIN 3.8 03/15/2023    BILIDIR 0.2 03/15/2023    AST 26 03/15/2023    ALT 28 03/15/2023    ALKPHOS 89 03/15/2023    MG 7.2 (HH) 10/09/2020    LABPROT 10.3  07/29/2022    INR 1.0 07/29/2022       Assessment and Plan:  Shanae Vo is a 39 y.o. 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 Importantly, there was NO fibrosis. Genetic testing was negative. More detailed genetic testing was deferred. There are reports of pregnancy unmasking genetic predisposition to IHCP such as in ABCB4 and ABCB11 which are implicated in progressive familial intrahepatic cholestasis (PFIC) and benign recurrent intrahepatic cholestasis (BRIC).     Labs completely normal now and pt has no symptoms currently. Continue to recommend hepatic panel, GGT and bile acids be checked every 6 months. Will check fibroscan in 1 years to confirm no fibrosis progression. Recommend an urgent MRI/MRCP if the AKP increases.    Return 6 months.

## 2024-09-25 ENCOUNTER — TELEPHONE (OUTPATIENT)
Dept: HEPATOLOGY | Facility: CLINIC | Age: 39
End: 2024-09-25
Payer: OTHER GOVERNMENT

## 2024-09-25 NOTE — TELEPHONE ENCOUNTER
----- Message from Mayte Cho MD sent at 9/24/2024  8:27 PM CDT -----  F/u in 6 months with johnathan mart

## 2025-03-02 ENCOUNTER — PATIENT MESSAGE (OUTPATIENT)
Dept: TRANSPLANT | Facility: CLINIC | Age: 40
End: 2025-03-02

## 2025-04-16 ENCOUNTER — PATIENT MESSAGE (OUTPATIENT)
Dept: TRANSPLANT | Facility: CLINIC | Age: 40
End: 2025-04-16
Payer: OTHER GOVERNMENT

## 2025-04-22 ENCOUNTER — OFFICE VISIT (OUTPATIENT)
Dept: HEPATOLOGY | Facility: CLINIC | Age: 40
End: 2025-04-22
Payer: OTHER GOVERNMENT

## 2025-04-22 ENCOUNTER — PATIENT MESSAGE (OUTPATIENT)
Dept: HEPATOLOGY | Facility: CLINIC | Age: 40
End: 2025-04-22

## 2025-04-22 DIAGNOSIS — O26.643 CHOLESTASIS DURING PREGNANCY IN THIRD TRIMESTER: Primary | ICD-10-CM

## 2025-04-22 DIAGNOSIS — R74.8 ELEVATED LIVER ENZYMES: ICD-10-CM

## 2025-04-22 PROCEDURE — 98006 SYNCH AUDIO-VIDEO EST MOD 30: CPT | Mod: 95,,, | Performed by: INTERNAL MEDICINE

## 2025-04-22 NOTE — PATIENT INSTRUCTIONS
17 lb weight loss- but cholesterol up  Increase in cholesterol ---mild fatty liver  Labs in 3 months and 6 months  Fibroscan shows mild fatty liver  Return 6 months

## 2025-04-22 NOTE — PROGRESS NOTES
The patient location is: home in [place]  The chief complaint leading to consultation is: f/u of liver biopsy    Visit type: audiovisual    Face to Face time with patient: 20 minutes  30 minutes of total time spent on the encounter, which includes face to face time and non-face to face time preparing to see the patient (eg, review of tests), Obtaining and/or reviewing separately obtained history, Documenting clinical information in the electronic or other health record, Independently interpreting results (not separately reported) and communicating results to the patient/family/caregiver, or Care coordination (not separately reported).     Each patient to whom he or she provides medical services by telemedicine is:  (1) informed of the relationship between the physician and patient and the respective role of any other health care provider with respect to management of the patient; and (2) notified that he or she may decline to receive medical services by telemedicine and may withdraw from such care at any time.    Notes:  HEPATOLOGY FOLLOW UP    Referring Physician: New Feliz MD  Current Corresponding Physician: New Feliz MD    Shanae Vo is here for follow up of a hx of presumed intrahepatic cholestasis of pregnancy    HPI  This patient saw Dr Brizuela, Hepatology at 22 weeks gestation 8/6/20.  At that time she had pruritus that started at ~18 weeks. Bile acids were elevated, but subsequently fluctuated (see below) She also saw fetal maternal high risk OB at [facility]. No prior hx of itching, cholestasis or liver disease. No family hx of liver disease.    He ordered serologies/autoimmune markers, bile acids as well as liver ultrasound w/ doppler to r/o other possible causes of liver disease for the sake of thoroughness in work-up. His advice was: if bile acids remained high and itching persisted, his plan was to start her on ursodeoxycholic acid. She was not seen in f/u. Because bile acids  fluctuated, it was not clear that she truly had intrahepatic cholestasis of pregnancy and was therefore not initially started on joesph. Towards the end of her pregnancy, her bile acids anh and she was started on joesph with an improvement in her itching.. The itching resolved with delivery.    Labs 20: Tbil 0.2, ALT 42, AST 34, ALKP 152.   HAV IgM-, HBsAG-, HCV Ab-, ASMA-, AMA-  Bile acids 20: 9. Repeat bile acids 20: 4;.20: elevated at 43.7;  down to 26  Labs 10/10/21 (POD#1): Tbil 0.3, ALT 17, AST 33, ALKP 168    Abdomen US with doppler 20: normal    She was admitted to the antepartum service 20 for serial BP monitoring and subsequently delivered 10/9/20 (31w1d) by  due to preeclampsia, severe, cholestasis of pregnancy, NRFHT, groin abscess.     Interval History:   Shanae Vo was seen by video visit 3/3/21 for a new elevation in liver enzymes. After her delivery her pruritus/cholestasis resolved and it did not recur. Because she was feeling very fatigued, her liver enzymes and thryoid were checked. Her enzymes were up:  Labs 21: Tbil 0.4, , , ALKP 373    I recommended a full serologic w/u and an MRI/MRCP:  Labs 3/8/21: Tbil 0.4 ALT 69, AST 40, ALKP 262, bile acids normal at 2  Ceruloplasmin normal at 33, peth negative, PINEDA-, ASMA-, AMA-, IgG normal at 1034, alhpa 1 antitrypsin 114 with normal phenotype of MM  HCV RNA-, HBsAg-, HBcAb-, HBsAb-, HAV IgM-, HAV IgG-  Iron sat 13%, ferritin 21  Of note had seen a rheumatologist x 2 in the past. Had abnormal serologies for both lupus and RA but not diagnosed with either disease. Does have hashimoto's thyroiditis.    MRI/MRCP 3/9/21: Liver: Normal homogeneous background signal.  No focal lesions.  Hepatic and portal veins are patent; Biliary: Gallbladder is decompressed. No filling defects.  No intrahepatic or extrahepatic biliary dilatation; Pancreas: Unremarkable.  No pancreatic ductal dilatation.    She  had a liver biopsy 6/18/21 that suggests she may have PFIC:       Genetic testing: negative; more extensive deferred    Most recent labs:  Labs 3/15/23: ALT 28, AST 26, ALKP 89, , bile acids 2  Labs 11/15/22: ALT 17, AST 18, ALKP 81, GGT 96, bile acids 3  Labs 7/2922: ALT 19, AST 19, ALKP 67, , bile acids 5  Labs 1/26/24: ALT 22, AST 25, ALKP 79, Tbil 0.4, GGT 84  Bile acids 1.4  Labs 8/28/24: ALT 16, AST 15, ALKP 75, Tbil 0.3, no bile acids;  No recent imaging  Labs 2/2825: ALT 46, AST 40, ALKP 103, Tbil 0.2, bile acids 0.9  Fibroscan 2/28/25: mild steatosis; F0-1 fibrosis    --She continues to feel well with no abdo pain, N/V or pruritus. Fatigue has not recurred.  --has lost ~17 lbs, but cholesterol increased  --had an abnormal mole removed by [facility]    Outpatient Encounter Medications as of 4/22/2025   Medication Sig Dispense Refill    benzocaine-menthoL 15-3.6 mg Lozg 1 each by Mucous Membrane route every 4 to 6 hours as needed (sore throat). 18 lozenge 5    fluticasone propionate (FLONASE) 50 mcg/actuation nasal spray 1 spray (50 mcg total) by Each Nostril route 2 (two) times daily as needed for Rhinitis. 16 mL 1    levothyroxine (SYNTHROID) 75 MCG tablet TAKE 1 TABLET BY MOUTH EVERY DAY BEFORE BREAKFAST ON AN EMPTY STOMACH 90 tablet 0    loratadine (CLARITIN) 10 mg tablet Take 1 tablet (10 mg total) by mouth once daily. 60 tablet 2    mupirocin (BACTROBAN) 2 % ointment Apply topically 3 (three) times daily as needed (folliculitis). 15 g 2     Facility-Administered Encounter Medications as of 4/22/2025   Medication Dose Route Frequency Provider Last Rate Last Admin    triamcinolone acetonide injection 40 mg  40 mg Intramuscular 1 time in Clinic/HOD New Feliz MD        triamcinolone acetonide injection 5 mg  5 mg Intradermal 1 time in Clinic/HOD Meena Still MD         Review of patient's allergies indicates:   Allergen Reactions    Shrimp Nausea And Vomiting     Past Medical History:    Diagnosis Date    Elevated liver enzymes 3/3/2021    Finger infection     right finger- patient will go to urgent care and take care of it    Hypertension     Thyroid disease     hypothyroid       Review of Systems   Constitutional: Negative.    HENT: Negative.    Eyes: Negative.    Respiratory: Negative.    Cardiovascular: Negative.    Gastrointestinal: Negative.    Genitourinary: Negative.    Musculoskeletal: Negative.    Skin: Negative.    Neurological: Negative.    Psychiatric/Behavioral: Negative.        Physical Exam        Lab Results   Component Value Date    GLU 97 07/29/2022    GLU 97 07/29/2022    BUN 10 07/29/2022    BUN 10 07/29/2022    CREATININE 0.7 07/29/2022    CREATININE 0.7 07/29/2022    CALCIUM 9.7 07/29/2022    CALCIUM 9.7 07/29/2022     07/29/2022     07/29/2022    K 4.2 07/29/2022    K 4.2 07/29/2022     07/29/2022     07/29/2022    PROT 7.4 03/15/2023    CO2 28 07/29/2022    CO2 28 07/29/2022    ANIONGAP 7 (L) 07/29/2022    ANIONGAP 7 (L) 07/29/2022    WBC 8.02 07/29/2022    WBC 8.02 07/29/2022    RBC 4.95 07/29/2022    RBC 4.95 07/29/2022    HGB 14.6 07/29/2022    HGB 14.6 07/29/2022    HCT 42.9 07/29/2022    HCT 42.9 07/29/2022    MCV 87 07/29/2022    MCV 87 07/29/2022    MCH 29.5 07/29/2022    MCH 29.5 07/29/2022    MCHC 34.0 07/29/2022    MCHC 34.0 07/29/2022     Lab Results   Component Value Date    RDW 12.0 07/29/2022    RDW 12.0 07/29/2022     07/29/2022     07/29/2022    MPV 9.1 (L) 07/29/2022    MPV 9.1 (L) 07/29/2022    GRAN 5.1 07/29/2022    GRAN 63.8 07/29/2022    LYMPH 2.1 07/29/2022    LYMPH 25.9 07/29/2022    MONO 0.7 07/29/2022    MONO 8.9 07/29/2022    EOSINOPHIL 0.9 07/29/2022    BASOPHIL 0.4 07/29/2022    EOS 0.1 07/29/2022    BASO 0.03 07/29/2022    APTT 23.6 10/09/2020    GROUPTRH A NEG 10/10/2020    CHOL 218 (H) 07/29/2022    TRIG 115 07/29/2022    HDL 59 07/29/2022    CHOLHDL 27.1 07/29/2022    TOTALCHOLEST 3.7 07/29/2022     ALBUMIN 3.8 03/15/2023    BILIDIR 0.2 03/15/2023    AST 26 03/15/2023    ALT 28 03/15/2023    ALKPHOS 89 03/15/2023    MG 7.2 (HH) 10/09/2020    LABPROT 10.3 07/29/2022    INR 1.0 07/29/2022       Assessment and Plan:  Shanae Vo is a 39 y.o. 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 Importantly, there was NO fibrosis. Genetic testing was negative. More detailed genetic testing was deferred. There are reports of pregnancy unmasking genetic predisposition to IHCP such as in ABCB4 and ABCB11 which are implicated in progressive familial intrahepatic cholestasis (PFIC) and benign recurrent intrahepatic cholestasis (BRIC).     Labs normalized but now are elevated although she continues to have no symptoms. Cholesterol has gone up and fibroscan shows a fatty liver. Thus, a fatty liver could be the reason for the elevated liver tests. Continue to recommend hepatic panel, GGT and bile acids be checked. But since labs are up- will repeat in 3 and 6 months. Recommend tx of hyperlipidemia. Recommend an urgent MRI/MRCP if the AKP increases.    Return 6 months.

## 2025-04-24 DIAGNOSIS — R79.89 ELEVATED LIVER FUNCTION TESTS: Primary | ICD-10-CM

## 2025-04-28 ENCOUNTER — TELEPHONE (OUTPATIENT)
Dept: HEPATOLOGY | Facility: CLINIC | Age: 40
End: 2025-04-28
Payer: OTHER GOVERNMENT

## 2025-05-15 ENCOUNTER — PATIENT MESSAGE (OUTPATIENT)
Dept: HEPATOLOGY | Facility: CLINIC | Age: 40
End: 2025-05-15
Payer: OTHER GOVERNMENT

## 2025-05-15 ENCOUNTER — TELEPHONE (OUTPATIENT)
Dept: HEPATOLOGY | Facility: CLINIC | Age: 40
End: 2025-05-15
Payer: OTHER GOVERNMENT

## 2025-05-16 ENCOUNTER — RESULTS FOLLOW-UP (OUTPATIENT)
Dept: TRANSPLANT | Facility: CLINIC | Age: 40
End: 2025-05-16

## 2025-05-16 ENCOUNTER — TELEPHONE (OUTPATIENT)
Dept: HEPATOLOGY | Facility: CLINIC | Age: 40
End: 2025-05-16
Payer: OTHER GOVERNMENT

## 2025-05-16 NOTE — TELEPHONE ENCOUNTER
"Per pt "Also my labs just came in today and I feel like it is better than three months ago per diet changes".   "

## 2025-05-19 ENCOUNTER — PATIENT MESSAGE (OUTPATIENT)
Dept: HEPATOLOGY | Facility: CLINIC | Age: 40
End: 2025-05-19
Payer: OTHER GOVERNMENT

## 2025-05-19 ENCOUNTER — TELEPHONE (OUTPATIENT)
Dept: HEPATOLOGY | Facility: CLINIC | Age: 40
End: 2025-05-19
Payer: OTHER GOVERNMENT

## 2025-06-03 ENCOUNTER — OFFICE VISIT (OUTPATIENT)
Dept: HEPATOLOGY | Facility: CLINIC | Age: 40
End: 2025-06-03
Payer: OTHER GOVERNMENT

## 2025-06-03 DIAGNOSIS — R74.8 ELEVATED LIVER ENZYMES: Primary | ICD-10-CM

## 2025-06-03 PROCEDURE — 98006 SYNCH AUDIO-VIDEO EST MOD 30: CPT | Mod: 95,,, | Performed by: INTERNAL MEDICINE

## 2025-06-03 NOTE — PROGRESS NOTES
The patient location is: home in [place]  The chief complaint leading to consultation is: f/u of liver biopsy    Visit type: audiovisual    Face to Face time with patient: 20 minutes  30 minutes of total time spent on the encounter, which includes face to face time and non-face to face time preparing to see the patient (eg, review of tests), Obtaining and/or reviewing separately obtained history, Documenting clinical information in the electronic or other health record, Independently interpreting results (not separately reported) and communicating results to the patient/family/caregiver, or Care coordination (not separately reported).     Each patient to whom he or she provides medical services by telemedicine is:  (1) informed of the relationship between the physician and patient and the respective role of any other health care provider with respect to management of the patient; and (2) notified that he or she may decline to receive medical services by telemedicine and may withdraw from such care at any time.    Notes:  HEPATOLOGY FOLLOW UP    Referring Physician: New Feliz MD  Current Corresponding Physician: New Feliz MD    Shanae Vo is here for follow up of a hx of presumed intrahepatic cholestasis of pregnancy    HPI  This patient saw Dr Brizuela, Hepatology at 22 weeks gestation 8/6/20.  At that time she had pruritus that started at ~18 weeks. Bile acids were elevated, but subsequently fluctuated (see below) She also saw fetal maternal high risk OB at [facility]. No prior hx of itching, cholestasis or liver disease. No family hx of liver disease.    He ordered serologies/autoimmune markers, bile acids as well as liver ultrasound w/ doppler to r/o other possible causes of liver disease for the sake of thoroughness in work-up. His advice was: if bile acids remained high and itching persisted, his plan was to start her on ursodeoxycholic acid. She was not seen in f/u. Because bile acids  fluctuated, it was not clear that she truly had intrahepatic cholestasis of pregnancy and was therefore not initially started on joesph. Towards the end of her pregnancy, her bile acids anh and she was started on joesph with an improvement in her itching.. The itching resolved with delivery.    Labs 20: Tbil 0.2, ALT 42, AST 34, ALKP 152.   HAV IgM-, HBsAG-, HCV Ab-, ASMA-, AMA-  Bile acids 20: 9. Repeat bile acids 20: 4;.20: elevated at 43.7;  down to 26  Labs 10/10/21 (POD#1): Tbil 0.3, ALT 17, AST 33, ALKP 168    Abdomen US with doppler 20: normal    She was admitted to the antepartum service 20 for serial BP monitoring and subsequently delivered 10/9/20 (31w1d) by  due to preeclampsia, severe, cholestasis of pregnancy, NRFHT, groin abscess.     Interval History:   Shanae Vo was seen by video visit 3/3/21 for a new elevation in liver enzymes. After her delivery her pruritus/cholestasis resolved and it did not recur. Because she was feeling very fatigued, her liver enzymes and thryoid were checked. Her enzymes were up:  Labs 21: Tbil 0.4, , , ALKP 373    I recommended a full serologic w/u and an MRI/MRCP:  Labs 3/8/21: Tbil 0.4 ALT 69, AST 40, ALKP 262, bile acids normal at 2  Ceruloplasmin normal at 33, peth negative, PINEDA-, ASMA-, AMA-, IgG normal at 1034, alhpa 1 antitrypsin 114 with normal phenotype of MM  HCV RNA-, HBsAg-, HBcAb-, HBsAb-, HAV IgM-, HAV IgG-  Iron sat 13%, ferritin 21  Of note had seen a rheumatologist x 2 in the past. Had abnormal serologies for both lupus and RA but not diagnosed with either disease. Does have hashimoto's thyroiditis.    MRI/MRCP 3/9/21: Liver: Normal homogeneous background signal.  No focal lesions.  Hepatic and portal veins are patent; Biliary: Gallbladder is decompressed. No filling defects.  No intrahepatic or extrahepatic biliary dilatation; Pancreas: Unremarkable.  No pancreatic ductal dilatation.    She  had a liver biopsy 6/18/21 that suggests she may have PFIC:       Genetic testing: negative; more extensive deferred    Most recent labs:  Labs 3/15/23: ALT 28, AST 26, ALKP 89, , bile acids 2  Labs 11/15/22: ALT 17, AST 18, ALKP 81, GGT 96, bile acids 3  Labs 7/2922: ALT 19, AST 19, ALKP 67, , bile acids 5  Labs 1/26/24: ALT 22, AST 25, ALKP 79, Tbil 0.4, GGT 84  Bile acids 1.4  Labs 8/28/24: ALT 16, AST 15, ALKP 75, Tbil 0.3, no bile acids;  No recent imaging  Labs 2/2825: ALT 46, AST 40, ALKP 103, Tbil 0.2, bile acids 0.9  Fibroscan 2/28/25: mild steatosis; F0-1 fibrosis    --She continues to feel well with no abdo pain, N/V or pruritus. Fatigue has not recurred.  --has lost ~17 lbs, but cholesterol increased  --had an abnormal mole removed by [facility] [place]    Outpatient Encounter Medications as of 6/3/2025   Medication Sig Dispense Refill    benzocaine-menthoL 15-3.6 mg Lozg 1 each by Mucous Membrane route every 4 to 6 hours as needed (sore throat). 18 lozenge 5    fluticasone propionate (FLONASE) 50 mcg/actuation nasal spray 1 spray (50 mcg total) by Each Nostril route 2 (two) times daily as needed for Rhinitis. 16 mL 1    levothyroxine (SYNTHROID) 75 MCG tablet TAKE 1 TABLET BY MOUTH EVERY DAY BEFORE BREAKFAST ON AN EMPTY STOMACH 90 tablet 0    loratadine (CLARITIN) 10 mg tablet Take 1 tablet (10 mg total) by mouth once daily. 60 tablet 2    mupirocin (BACTROBAN) 2 % ointment Apply topically 3 (three) times daily as needed (folliculitis). 15 g 2     Facility-Administered Encounter Medications as of 6/3/2025   Medication Dose Route Frequency Provider Last Rate Last Admin    triamcinolone acetonide injection 40 mg  40 mg Intramuscular 1 time in Clinic/HOD New Feliz MD        triamcinolone acetonide injection 5 mg  5 mg Intradermal 1 time in Clinic/HOD Meena Still MD         Review of patient's allergies indicates:   Allergen Reactions    Shrimp Nausea And Vomiting     Past Medical History:    Diagnosis Date    Elevated liver enzymes 3/3/2021    Finger infection     right finger- patient will go to urgent care and take care of it    Hypertension     Thyroid disease     hypothyroid       Review of Systems   Constitutional: Negative.    HENT: Negative.    Eyes: Negative.    Respiratory: Negative.    Cardiovascular: Negative.    Gastrointestinal: Negative.    Genitourinary: Negative.    Musculoskeletal: Negative.    Skin: Negative.    Neurological: Negative.    Psychiatric/Behavioral: Negative.        Physical Exam        Lab Results   Component Value Date    GLU 80 05/15/2025    BUN 11 05/15/2025    CREATININE 0.59 05/15/2025    CALCIUM 9.8 05/15/2025     05/15/2025    K 4.4 05/15/2025     05/15/2025    PROT 7.4 03/15/2023    CO2 22 05/15/2025    ANIONGAP 7 (L) 07/29/2022    ANIONGAP 7 (L) 07/29/2022    WBC 6.4 05/15/2025    WBC 8.02 07/29/2022    WBC 8.02 07/29/2022    RBC 4.76 05/15/2025    RBC 4.95 07/29/2022    RBC 4.95 07/29/2022    HGB 14.6 05/15/2025    HGB 14.6 07/29/2022    HGB 14.6 07/29/2022    HCT 43.3 05/15/2025    HCT 42.9 07/29/2022    HCT 42.9 07/29/2022    MCV 91 05/15/2025    MCV 87 07/29/2022    MCV 87 07/29/2022    MCH 30.7 05/15/2025    MCH 29.5 07/29/2022    MCH 29.5 07/29/2022    MCHC 33.7 05/15/2025    MCHC 34.0 07/29/2022    MCHC 34.0 07/29/2022     Lab Results   Component Value Date    RDW 11.9 05/15/2025    RDW 12.0 07/29/2022    RDW 12.0 07/29/2022     05/15/2025     07/29/2022     07/29/2022    MPV 9.1 (L) 07/29/2022    MPV 9.1 (L) 07/29/2022    GRAN 5.1 07/29/2022    GRAN 63.8 07/29/2022    LYMPH 1.7 05/15/2025    LYMPH 2.1 07/29/2022    LYMPH 25.9 07/29/2022    MONO 7 05/15/2025    MONO 0.4 05/15/2025    MONO 0.7 07/29/2022    MONO 8.9 07/29/2022    EOSINOPHIL 1 05/15/2025    EOSINOPHIL 0.9 07/29/2022    BASOPHIL 1 05/15/2025    BASOPHIL 0.4 07/29/2022    EOS 0.0 05/15/2025    EOS 0.1 07/29/2022    BASO 0.0 05/15/2025    BASO 0.03 07/29/2022     APTT 23.6 10/09/2020    GROUPTRH A NEG 10/10/2020    CHOL 218 (H) 07/29/2022    TRIG 115 07/29/2022    HDL 59 07/29/2022    CHOLHDL 27.1 07/29/2022    TOTALCHOLEST 3.7 07/29/2022    ALBUMIN 4.7 05/15/2025    ALBUMIN 3.8 03/15/2023    BILIDIR 0.17 05/15/2025    AST 40 05/15/2025    ALT 59 (H) 05/15/2025    ALKPHOS 89 03/15/2023    MG 7.2 (HH) 10/09/2020    LABPROT 10.9 05/15/2025    INR 1.0 05/15/2025       Assessment and Plan:  Shanae Vo is a 39 y.o. 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 Importantly, there was NO fibrosis. Genetic testing was negative. More detailed genetic testing was deferred. There are reports of pregnancy unmasking genetic predisposition to IHCP such as in ABCB4 and ABCB11 which are implicated in progressive familial intrahepatic cholestasis (PFIC) and benign recurrent intrahepatic cholestasis (BRIC).     Labs normalized but now are elevated although she continues to have no symptoms. Recommend hepatic panel, GGT and bile acids monthly x 3. Recommend an urgent MRI/MRCP if the AKP increases. Avoid lipitor.    Return 3 months.

## 2025-06-26 ENCOUNTER — PATIENT MESSAGE (OUTPATIENT)
Dept: HEPATOLOGY | Facility: CLINIC | Age: 40
End: 2025-06-26
Payer: OTHER GOVERNMENT

## 2025-06-27 ENCOUNTER — RESULTS FOLLOW-UP (OUTPATIENT)
Dept: TRANSPLANT | Facility: CLINIC | Age: 40
End: 2025-06-27

## 2025-06-27 LAB
ALBUMIN SERPL-MCNC: 4.7 G/DL (ref 3.9–4.9)
ALP SERPL-CCNC: 90 IU/L (ref 44–121)
ALT SERPL-CCNC: 37 IU/L (ref 0–32)
AST SERPL-CCNC: 39 IU/L (ref 0–40)
BASOPHILS # BLD AUTO: 0 X10E3/UL (ref 0–0.2)
BASOPHILS NFR BLD AUTO: 1 %
BILE AC SERPL-SCNC: 4.1 UMOL/L (ref 0–10)
BILIRUB DIRECT SERPL-MCNC: 0.12 MG/DL (ref 0–0.4)
BILIRUB SERPL-MCNC: 0.3 MG/DL (ref 0–1.2)
BUN SERPL-MCNC: 10 MG/DL (ref 6–24)
BUN/CREAT SERPL: 15 (ref 9–23)
CALCIUM SERPL-MCNC: 10.1 MG/DL (ref 8.7–10.2)
CHLORIDE SERPL-SCNC: 99 MMOL/L (ref 96–106)
CO2 SERPL-SCNC: 22 MMOL/L (ref 20–29)
CREAT SERPL-MCNC: 0.68 MG/DL (ref 0.57–1)
EOSINOPHIL # BLD AUTO: 0.1 X10E3/UL (ref 0–0.4)
EOSINOPHIL NFR BLD AUTO: 1 %
ERYTHROCYTE [DISTWIDTH] IN BLOOD BY AUTOMATED COUNT: 11.7 % (ref 11.7–15.4)
GFR SERPLBLD CREATININE-BSD FMLA CKD-EPI: 113 ML/MIN/1.73
GGT SERPL-CCNC: 83 IU/L (ref 0–60)
GLOBULIN SER CALC-MCNC: 2.7 G/DL (ref 1.5–4.5)
GLUCOSE SERPL-MCNC: 86 MG/DL (ref 70–99)
HCT VFR BLD AUTO: 42.4 % (ref 34–46.6)
HGB BLD-MCNC: 14.4 G/DL (ref 11.1–15.9)
IMM GRANULOCYTES # BLD AUTO: 0 X10E3/UL (ref 0–0.1)
IMM GRANULOCYTES NFR BLD AUTO: 0 %
INR PPP: 1.1 (ref 0.9–1.2)
LYMPHOCYTES # BLD AUTO: 1.7 X10E3/UL (ref 0.7–3.1)
LYMPHOCYTES NFR BLD AUTO: 29 %
MCH RBC QN AUTO: 31.1 PG (ref 26.6–33)
MCHC RBC AUTO-ENTMCNC: 34 G/DL (ref 31.5–35.7)
MCV RBC AUTO: 92 FL (ref 79–97)
MONOCYTES # BLD AUTO: 0.5 X10E3/UL (ref 0.1–0.9)
MONOCYTES NFR BLD AUTO: 8 %
NEUTROPHILS # BLD AUTO: 3.6 X10E3/UL (ref 1.4–7)
NEUTROPHILS NFR BLD AUTO: 61 %
PLATELET # BLD AUTO: 334 X10E3/UL (ref 150–450)
POTASSIUM SERPL-SCNC: 4.1 MMOL/L (ref 3.5–5.2)
PROT SERPL-MCNC: 7.4 G/DL (ref 6–8.5)
PROTHROMBIN TIME: 11.3 SEC (ref 9.1–12)
RBC # BLD AUTO: 4.63 X10E6/UL (ref 3.77–5.28)
SODIUM SERPL-SCNC: 137 MMOL/L (ref 134–144)
WBC # BLD AUTO: 5.9 X10E3/UL (ref 3.4–10.8)

## 2025-07-07 DIAGNOSIS — R79.89 LOW VITAMIN D LEVEL: Primary | ICD-10-CM

## 2025-08-26 ENCOUNTER — PATIENT MESSAGE (OUTPATIENT)
Dept: FAMILY MEDICINE | Facility: CLINIC | Age: 40
End: 2025-08-26
Payer: OTHER GOVERNMENT

## 2025-08-27 LAB
ALBUMIN SERPL-MCNC: 4.6 G/DL (ref 3.9–4.9)
ALP SERPL-CCNC: 103 IU/L (ref 44–121)
ALT SERPL-CCNC: 26 IU/L (ref 0–32)
AST SERPL-CCNC: 22 IU/L (ref 0–40)
BILIRUB DIRECT SERPL-MCNC: 0.14 MG/DL (ref 0–0.4)
BILIRUB SERPL-MCNC: 0.4 MG/DL (ref 0–1.2)
BUN SERPL-MCNC: 11 MG/DL (ref 6–24)
BUN/CREAT SERPL: 18 (ref 9–23)
CALCIUM SERPL-MCNC: 9.6 MG/DL (ref 8.7–10.2)
CHLORIDE SERPL-SCNC: 99 MMOL/L (ref 96–106)
CO2 SERPL-SCNC: 20 MMOL/L (ref 20–29)
CREAT SERPL-MCNC: 0.6 MG/DL (ref 0.57–1)
GFR SERPLBLD CREATININE-BSD FMLA CKD-EPI: 116 ML/MIN/1.73
GLOBULIN SER CALC-MCNC: 2.5 G/DL (ref 1.5–4.5)
GLUCOSE SERPL-MCNC: 82 MG/DL (ref 70–99)
INR PPP: 1 (ref 0.9–1.2)
POTASSIUM SERPL-SCNC: 4.2 MMOL/L (ref 3.5–5.2)
PROT SERPL-MCNC: 7.1 G/DL (ref 6–8.5)
PROTHROMBIN TIME: 10.3 SEC (ref 9.1–12)
SODIUM SERPL-SCNC: 135 MMOL/L (ref 134–144)

## (undated) DEVICE — KIT BIOPSY GUN SET 18G 10CM

## (undated) DEVICE — KIT PROBE COVER WITH GEL

## (undated) DEVICE — CHLORAPREP 1.5 ML FREPP

## (undated) DEVICE — Device

## (undated) DEVICE — GLOVE BIOGEL SKINSENSE PI 8.0